# Patient Record
Sex: MALE | Race: WHITE | Employment: FULL TIME | URBAN - METROPOLITAN AREA
[De-identification: names, ages, dates, MRNs, and addresses within clinical notes are randomized per-mention and may not be internally consistent; named-entity substitution may affect disease eponyms.]

---

## 2018-02-07 LAB — TSH SERPL DL<=0.05 MIU/L-ACNC: 2.69 UIU/ML

## 2019-05-28 ENCOUNTER — TELEPHONE (OUTPATIENT)
Dept: FAMILY MEDICINE CLINIC | Age: 34
End: 2019-05-28

## 2019-05-28 NOTE — TELEPHONE ENCOUNTER
There are 3 classes of medicines that are used for sinus allergies. #1 antihistamines, like Claritin,  No. 2 Flonase steroid nasal spray and #3 saline nasal spray to washout allergens. Which of these would he like added to what he is using. ?

## 2019-05-28 NOTE — TELEPHONE ENCOUNTER
----- Message from Dolly Holden LPN sent at 5/54/6000  5:01 PM EDT -----      ----- Message -----  From: Brittnee Barnhart  Sent: 5/28/2019   3:44 PM  To: Loc Poole LPN    HAS ALLERGIES REALLY BAD THIS YEAR AND TRIED OTC MEDS AND NOT ANY HELP. COULD HE GET SOMETHING CALLED IN.     SERA NOBLE

## 2019-06-06 ENCOUNTER — TELEPHONE (OUTPATIENT)
Dept: FAMILY MEDICINE CLINIC | Age: 34
End: 2019-06-06

## 2019-06-06 NOTE — TELEPHONE ENCOUNTER
Requested Prescriptions     Signed Prescriptions Disp Refills    sertraline (ZOLOFT) 50 MG tablet 30 tablet 0     Sig: Take 1 tablet by mouth daily     Authorizing Provider: Gege Walker     Ordering User: Jose Leblanc

## 2019-06-15 SDOH — HEALTH STABILITY: MENTAL HEALTH: HOW OFTEN DO YOU HAVE A DRINK CONTAINING ALCOHOL?: MONTHLY OR LESS

## 2019-07-01 ENCOUNTER — OFFICE VISIT (OUTPATIENT)
Dept: FAMILY MEDICINE CLINIC | Age: 34
End: 2019-07-01
Payer: COMMERCIAL

## 2019-07-01 VITALS
HEIGHT: 69 IN | WEIGHT: 263 LBS | SYSTOLIC BLOOD PRESSURE: 126 MMHG | DIASTOLIC BLOOD PRESSURE: 88 MMHG | HEART RATE: 57 BPM | BODY MASS INDEX: 38.95 KG/M2 | OXYGEN SATURATION: 97 %

## 2019-07-01 DIAGNOSIS — I10 ESSENTIAL HYPERTENSION: Primary | ICD-10-CM

## 2019-07-01 DIAGNOSIS — F41.8 SITUATIONAL ANXIETY: ICD-10-CM

## 2019-07-01 DIAGNOSIS — J30.2 SEASONAL ALLERGIES: ICD-10-CM

## 2019-07-01 DIAGNOSIS — R06.83 SNORING: ICD-10-CM

## 2019-07-01 PROBLEM — F98.8 ATTENTION DEFICIT DISORDER (ADD) WITHOUT HYPERACTIVITY: Status: ACTIVE | Noted: 2019-07-01

## 2019-07-01 PROCEDURE — 99213 OFFICE O/P EST LOW 20 MIN: CPT | Performed by: FAMILY MEDICINE

## 2019-07-01 RX ORDER — TRIAMTERENE AND HYDROCHLOROTHIAZIDE 37.5; 25 MG/1; MG/1
1 TABLET ORAL DAILY
Qty: 90 TABLET | Refills: 1 | Status: SHIPPED | OUTPATIENT
Start: 2019-07-01 | End: 2020-02-04 | Stop reason: SDUPTHER

## 2019-07-01 RX ORDER — TRIAMTERENE AND HYDROCHLOROTHIAZIDE 37.5; 25 MG/1; MG/1
1 TABLET ORAL DAILY
COMMUNITY
End: 2019-07-01 | Stop reason: SDUPTHER

## 2019-07-01 ASSESSMENT — ANXIETY QUESTIONNAIRES
GAD7 TOTAL SCORE: 8
1. FEELING NERVOUS, ANXIOUS, OR ON EDGE: 3-NEARLY EVERY DAY
2. NOT BEING ABLE TO STOP OR CONTROL WORRYING: 1-SEVERAL DAYS
3. WORRYING TOO MUCH ABOUT DIFFERENT THINGS: 1-SEVERAL DAYS
7. FEELING AFRAID AS IF SOMETHING AWFUL MIGHT HAPPEN: 0-NOT AT ALL SURE
4. TROUBLE RELAXING: 0-NOT AT ALL SURE
6. BECOMING EASILY ANNOYED OR IRRITABLE: 3-NEARLY EVERY DAY
5. BEING SO RESTLESS THAT IT IS HARD TO SIT STILL: 0-NOT AT ALL SURE

## 2019-07-01 ASSESSMENT — ENCOUNTER SYMPTOMS
NAUSEA: 0
COUGH: 0
ABDOMINAL PAIN: 0
DIARRHEA: 0
SHORTNESS OF BREATH: 0
VOMITING: 0

## 2019-07-01 ASSESSMENT — PATIENT HEALTH QUESTIONNAIRE - PHQ9
2. FEELING DOWN, DEPRESSED OR HOPELESS: 0
1. LITTLE INTEREST OR PLEASURE IN DOING THINGS: 0
SUM OF ALL RESPONSES TO PHQ QUESTIONS 1-9: 0
SUM OF ALL RESPONSES TO PHQ9 QUESTIONS 1 & 2: 0
SUM OF ALL RESPONSES TO PHQ QUESTIONS 1-9: 0

## 2019-07-01 NOTE — PROGRESS NOTES
consistently. Patient is to follow-up in 6 months unless otherwise needed and that time. Patient verbalized understanding of and agreement with current POC for the assessment and plan dictated above. Electronically signed by JOAQUÍN Villanueva on 7/1/2019      Please note that this chart was generated using dragon dictation software. Although every effort was made to ensure the accuracy of this automated transcription, some errors in transcription may have occurred.

## 2019-07-01 NOTE — PATIENT INSTRUCTIONS
dose of your diabetes medications. It is not known whether fluticasone nasal will harm an unborn baby. Ask a doctor before using this medicine if you are pregnant. It is not known whether fluticasone nasal passes into breast milk or if it could affect a nursing baby. Ask a doctor before using this medicine if you are breast-feeding. How should I use fluticasone nasal?  Use exactly as directed on the label, or as prescribed by your doctor. Do not use in larger or smaller amounts or for longer than recommended. Do not share this medicine with another person, even if they have the same symptoms you have. The usual dose of fluticasone nasal is 1 to 2 sprays into each nostril once or twice per day. Your dose will depend on the fluticasone brand or strength you use, and your dose may change once your symptoms improve. Follow all dosing instructions very carefully. Celia Brink is not approved for use by anyone younger than 25years old. Do not use Flonase in a child younger than 3years old. Do not use Veramyst in a child younger than 3years old. Any child using fluticasone nasal should be supervised by an adult while using the nasal spray. This medicine comes with patient instructions for safe and effective use, and directions for priming the nasal spray device. Follow these directions carefully. Ask your doctor or pharmacist if you have any questions. Shake the nasal spray just before each use. If you switched to fluticasone from another steroid medicine, do not stop using the other steroid suddenly or you may have unpleasant withdrawal symptoms. Talk with your doctor about tapering your steroid dose before stopping completely. To be sure fluticasone nasal is not causing harmful effects on your nose or sinuses, your doctor may need to check your progress on a regular basis. It may take up to several days before your symptoms improve.  Keep using the medication as directed and tell your doctor if your symptoms do adults and children who are at least 3years old. Mometasone nasal is also used to prevent seasonal allergy symptoms in adults and children who are at least 15years old. Mometasone nasal is used to treat nasal polyps only in adults. Mometasone may also be used for other purposes not listed in this medication guide. What should I discuss with my healthcare provider before using mometasone nasal?  You should not use this medicine if you are allergic to mometasone. Tell your doctor if you have ever had:  · an active or chronic infection;  · glaucoma or cataracts;  · herpes simplex virus of your eyes;  · tuberculosis or any other infection or illness;  · sores or ulcers inside your nose; or  · nasal surgery or injury to your nose. It is not known whether this medicine will harm an unborn baby. Tell your doctor if you are pregnant or plan to become pregnant. It may not be safe to breast-feed a baby while you are using this medicine. Ask your doctor about any risks. Mometasone nasal spray is not approved to treat allergy symptoms in anyone younger than 3years old, or to prevent allergy symptoms in anyone younger than 15years old. Mometasone nasal implant  is not approved for use by anyone younger than 25years old. How should I use mometasone nasal?  Follow all directions on your prescription label and read all medication guides or instruction sheets. Use the medicine exactly as directed. Do not take by mouth. Mometasone nasal is for use only in your nose. Your doctor may recommend you start using mometasone nasal 2 to 4 weeks before the start of allergy season. Read and carefully follow any Instructions for Use provided with your medicine. Ask your doctor or pharmacist if you do not understand these instructions. Shake the nasal spray well just before each use. Before your first use, prime the nasal spray pump by spraying the medicine into the air until a fine mist appears.  If the nasal spray has not been near people who are sick or have infections. Call your doctor for preventive treatment if you are exposed to chickenpox or measles. These conditions can be serious or even fatal in people who are using steroid medicine. What are the possible side effects of mometasone nasal?  Get emergency medical help if you have signs of an allergic reaction: hives; difficult breathing; swelling of your face, lips, tongue, or throat. Call your doctor at once if you have:  · severe bleeding or increased drainage from your nose;  · nose pain or discomfort, headache;  · white patches or sores in the nose that won't heal;  · wheezing, trouble breathing;  · vision problems;  · irritation or a choking feeling in the back of your throat (may be signs that the implant has moved inside your nose); or  · ear pain or full feeling, trouble hearing, drainage from the ear. Steroid medicine can affect growth in children. Tell your doctor if your child is not growing at a normal rate while using mometasone nasal.  Although the risk of serious side effects is low when mometasone is used in the nose, side effects can occur if the medicine is absorbed into your bloodstream. Tell your doctor if you have possible signs of long-term steroid use:  · weight gain (especially in your face or your upper back and torso);  · slow wound healing, thinning skin, increased body hair;  · irregular menstrual periods, changes in sexual function; or  · muscle weakness, tired feeling, depression, anxiety, or feeling irritable. Common side effects may include:  · nosebleeds;  · headache;  · stuffy nose, sore throat, cough; or  · flu-like symptoms. This is not a complete list of side effects and others may occur. Call your doctor for medical advice about side effects. You may report side effects to FDA at 6-989-FDA-0603. What other drugs will affect mometasone nasal?  Sometimes it is not safe to use certain medications at the same time.  Some drugs can affect your blood levels of other drugs you take, which may increase side effects or make the medications less effective. Tell your doctor about all your other medicines, especially:  · an antibiotic;  · antifungal medicine;  · an antidepressant; or  · antiviral medicine to treat HIV/AIDS. This list is not complete. Other drugs may affect mometasone nasal, including prescription and over-the-counter medicines, vitamins, and herbal products. Not all possible drug interactions are listed here. Where can I get more information? Your pharmacist can provide more information about mometasone nasal.  Remember, keep this and all other medicines out of the reach of children, never share your medicines with others, and use this medication only for the indication prescribed. Every effort has been made to ensure that the information provided by [address] is accurate, up-to-date, and complete, but no guarantee is made to that effect. Drug information contained herein may be time sensitive. [facility] information has been compiled for use by healthcare practitioners and consumers in the [place] and therefore [facility] does not warrant that uses outside of the [place] are appropriate, unless specifically indicated otherwise. [facility]'s drug information does not endorse drugs, diagnose patients or recommend therapy. [facility] drug information is an informational resource designed to assist licensed healthcare practitioners in caring for their patients and/or to serve consumers viewing this service as a supplement to, and not a substitute for, the expertise, skill, knowledge and judgment of healthcare practitioners. The absence of a warning for a given drug or drug combination in no way should be construed to indicate that the drug or drug combination is safe, effective or appropriate for any given patient.  [facility] does not assume any responsibility for any aspect of healthcare administered with the aid of information

## 2019-07-16 ENCOUNTER — HOSPITAL ENCOUNTER (OUTPATIENT)
Dept: SLEEP CENTER | Age: 34
Discharge: HOME | End: 2019-07-16
Payer: COMMERCIAL

## 2019-07-16 VITALS
BODY MASS INDEX: 40.07 KG/M2 | WEIGHT: 270.5 LBS | DIASTOLIC BLOOD PRESSURE: 77 MMHG | HEIGHT: 69 IN | OXYGEN SATURATION: 96 % | SYSTOLIC BLOOD PRESSURE: 133 MMHG | HEART RATE: 69 BPM

## 2019-07-16 DIAGNOSIS — G47.33 OSA (OBSTRUCTIVE SLEEP APNEA): Primary | ICD-10-CM

## 2019-07-16 PROCEDURE — 99211 OFF/OP EST MAY X REQ PHY/QHP: CPT | Performed by: INTERNAL MEDICINE

## 2019-07-16 ASSESSMENT — SLEEP AND FATIGUE QUESTIONNAIRES
HOW LIKELY ARE YOU TO NOD OFF OR FALL ASLEEP IN A CAR, WHILE STOPPED FOR A FEW MINUTES IN TRAFFIC: 0
HOW LIKELY ARE YOU TO NOD OFF OR FALL ASLEEP WHILE SITTING INACTIVE IN A PUBLIC PLACE: 0
HOW LIKELY ARE YOU TO NOD OFF OR FALL ASLEEP WHEN YOU ARE A PASSENGER IN A CAR FOR AN HOUR WITHOUT A BREAK: 2
ESS TOTAL SCORE: 13
HOW LIKELY ARE YOU TO NOD OFF OR FALL ASLEEP WHILE SITTING AND TALKING TO SOMEONE: 0
HOW LIKELY ARE YOU TO NOD OFF OR FALL ASLEEP WHILE SITTING AND READING: 2
HOW LIKELY ARE YOU TO NOD OFF OR FALL ASLEEP WHILE LYING DOWN TO REST IN THE AFTERNOON WHEN CIRCUMSTANCES PERMIT: 3
HOW LIKELY ARE YOU TO NOD OFF OR FALL ASLEEP WHILE WATCHING TV: 3
HOW LIKELY ARE YOU TO NOD OFF OR FALL ASLEEP WHILE SITTING QUIETLY AFTER LUNCH WITHOUT ALCOHOL: 3

## 2020-01-23 ENCOUNTER — TELEPHONE (OUTPATIENT)
Dept: FAMILY MEDICINE CLINIC | Age: 35
End: 2020-01-23

## 2020-02-04 ENCOUNTER — OFFICE VISIT (OUTPATIENT)
Dept: FAMILY MEDICINE CLINIC | Age: 35
End: 2020-02-04
Payer: COMMERCIAL

## 2020-02-04 VITALS
HEART RATE: 68 BPM | OXYGEN SATURATION: 99 % | DIASTOLIC BLOOD PRESSURE: 80 MMHG | WEIGHT: 277.6 LBS | SYSTOLIC BLOOD PRESSURE: 128 MMHG | BODY MASS INDEX: 41.12 KG/M2 | HEIGHT: 69 IN

## 2020-02-04 PROCEDURE — 99213 OFFICE O/P EST LOW 20 MIN: CPT | Performed by: FAMILY MEDICINE

## 2020-02-04 PROCEDURE — G8431 POS CLIN DEPRES SCRN F/U DOC: HCPCS | Performed by: FAMILY MEDICINE

## 2020-02-04 RX ORDER — TRIAMTERENE AND HYDROCHLOROTHIAZIDE 37.5; 25 MG/1; MG/1
1 TABLET ORAL DAILY
Qty: 90 TABLET | Refills: 1 | Status: SHIPPED | OUTPATIENT
Start: 2020-02-04 | End: 2020-09-28 | Stop reason: SDUPTHER

## 2020-02-04 ASSESSMENT — ANXIETY QUESTIONNAIRES
1. FEELING NERVOUS, ANXIOUS, OR ON EDGE: 1-SEVERAL DAYS
3. WORRYING TOO MUCH ABOUT DIFFERENT THINGS: 1-SEVERAL DAYS
6. BECOMING EASILY ANNOYED OR IRRITABLE: 1-SEVERAL DAYS
5. BEING SO RESTLESS THAT IT IS HARD TO SIT STILL: 1-SEVERAL DAYS
4. TROUBLE RELAXING: 1-SEVERAL DAYS
2. NOT BEING ABLE TO STOP OR CONTROL WORRYING: 1-SEVERAL DAYS
GAD7 TOTAL SCORE: 7
7. FEELING AFRAID AS IF SOMETHING AWFUL MIGHT HAPPEN: 1-SEVERAL DAYS

## 2020-02-04 ASSESSMENT — ENCOUNTER SYMPTOMS
NAUSEA: 0
SHORTNESS OF BREATH: 0
VOMITING: 0
DIARRHEA: 0
ABDOMINAL PAIN: 0
COUGH: 0

## 2020-02-04 ASSESSMENT — PATIENT HEALTH QUESTIONNAIRE - PHQ9
5. POOR APPETITE OR OVEREATING: 3
7. TROUBLE CONCENTRATING ON THINGS, SUCH AS READING THE NEWSPAPER OR WATCHING TELEVISION: 1
SUM OF ALL RESPONSES TO PHQ QUESTIONS 1-9: 13
SUM OF ALL RESPONSES TO PHQ QUESTIONS 1-9: 13
8. MOVING OR SPEAKING SO SLOWLY THAT OTHER PEOPLE COULD HAVE NOTICED. OR THE OPPOSITE, BEING SO FIGETY OR RESTLESS THAT YOU HAVE BEEN MOVING AROUND A LOT MORE THAN USUAL: 0
1. LITTLE INTEREST OR PLEASURE IN DOING THINGS: 1
10. IF YOU CHECKED OFF ANY PROBLEMS, HOW DIFFICULT HAVE THESE PROBLEMS MADE IT FOR YOU TO DO YOUR WORK, TAKE CARE OF THINGS AT HOME, OR GET ALONG WITH OTHER PEOPLE: 0
9. THOUGHTS THAT YOU WOULD BE BETTER OFF DEAD, OR OF HURTING YOURSELF: 0
2. FEELING DOWN, DEPRESSED OR HOPELESS: 1
SUM OF ALL RESPONSES TO PHQ9 QUESTIONS 1 & 2: 2
4. FEELING TIRED OR HAVING LITTLE ENERGY: 3
6. FEELING BAD ABOUT YOURSELF - OR THAT YOU ARE A FAILURE OR HAVE LET YOURSELF OR YOUR FAMILY DOWN: 1
3. TROUBLE FALLING OR STAYING ASLEEP: 3

## 2020-02-04 NOTE — PROGRESS NOTES
Gastrointestinal: Negative for abdominal pain, diarrhea, nausea and vomiting. Skin: Negative for rash. Neurological: Negative for dizziness, light-headedness and headaches. Psychiatric/Behavioral: Negative for self-injury and suicidal ideas. No homicidal ideations       PAST MEDICAL HISTORY  History reviewed. No pertinent past medical history.     FAMILY HISTORY  Family History   Problem Relation Age of Onset    Other Mother         Adriano's    Cancer Father         Testicular       SOCIAL HISTORY  Social History     Socioeconomic History    Marital status:      Spouse name: None    Number of children: None    Years of education: None    Highest education level: None   Occupational History    None   Social Needs    Financial resource strain: None    Food insecurity:     Worry: None     Inability: None    Transportation needs:     Medical: None     Non-medical: None   Tobacco Use    Smoking status: Never Smoker    Smokeless tobacco: Former User     Types: Chew   Substance and Sexual Activity    Alcohol use: Yes     Frequency: Monthly or less     Comment: Occasionally    Drug use: Never    Sexual activity: Yes     Partners: Female   Lifestyle    Physical activity:     Days per week: None     Minutes per session: None    Stress: None   Relationships    Social connections:     Talks on phone: None     Gets together: None     Attends Jain service: None     Active member of club or organization: None     Attends meetings of clubs or organizations: None     Relationship status: None    Intimate partner violence:     Fear of current or ex partner: None     Emotionally abused: None     Physically abused: None     Forced sexual activity: None   Other Topics Concern    None   Social History Narrative    None        SURGICAL HISTORY  Past Surgical History:   Procedure Laterality Date    INGUINAL HERNIA REPAIR Left        CURRENT MEDICATIONS  Current Outpatient Medications Medication Sig Dispense Refill    triamterene-hydrochlorothiazide (MAXZIDE-25) 37.5-25 MG per tablet Take 1 tablet by mouth daily 90 tablet 1    sertraline (ZOLOFT) 50 MG tablet Take 1 tablet by mouth daily 30 tablet 0     No current facility-administered medications for this visit. ALLERGIES  No Known Allergies    PHYSICAL EXAM    Physical Exam  Vitals signs and nursing note reviewed. Constitutional:       General: He is not in acute distress. Appearance: He is well-developed. He is not diaphoretic. HENT:      Head: Normocephalic and atraumatic. Cardiovascular:      Rate and Rhythm: Normal rate and regular rhythm. Heart sounds: Normal heart sounds. Pulmonary:      Effort: Pulmonary effort is normal. No respiratory distress. Breath sounds: Normal breath sounds. Abdominal:      General: Bowel sounds are normal.      Palpations: Abdomen is soft. Tenderness: There is no abdominal tenderness. Skin:     General: Skin is warm and dry. Neurological:      Mental Status: He is alert and oriented to person, place, and time. Psychiatric:         Thought Content: Thought content normal.         ASSESSMENT & PLAN    1. Essential hypertension  Continue taking medications as prescribed and refills will be provided as necessary.  - triamterene-hydrochlorothiazide (MAXZIDE-25) 37.5-25 MG per tablet; Take 1 tablet by mouth daily  Dispense: 90 tablet; Refill: 1    2. Situational anxiety  Continue taking medications as prescribed and refills will be provided as necessary.  - sertraline (ZOLOFT) 50 MG tablet; Take 1 tablet by mouth daily  Dispense: 30 tablet; Refill: 0    3. Positive depression screening  Patient has restarted his Zoloft, and is doing much better. On the basis of positive PHQ-9 screening (PHQ-9 Total Score: 13), the following plan was implemented: continue previously prescribed Zoloft- 50 mg, 1 tablet p.o. daily. - patient will call for any significant medication side effects or worsening symptoms of depression. Patient will follow-up in 4 week(s) with PCP.  - Positive Screen for Clinical Depression with a Documented Follow-up Plan     Patient is to follow-up in approximately 3 to 4 weeks, unless otherwise needed in that time. Pt is to call with any questions, concerns, or increase in symptoms. Pt verbalized understanding of and agreement with current plan of care. Electronically signed by Lila Barthel, PA on 2/4/2020      Please note that this chart was generated using dragon dictation software. Although every effort was made to ensure the accuracy of this automated transcription, some errors in transcription may have occurred.

## 2020-03-03 ENCOUNTER — OFFICE VISIT (OUTPATIENT)
Dept: FAMILY MEDICINE CLINIC | Age: 35
End: 2020-03-03
Payer: COMMERCIAL

## 2020-03-03 VITALS
HEART RATE: 64 BPM | DIASTOLIC BLOOD PRESSURE: 82 MMHG | HEIGHT: 69 IN | SYSTOLIC BLOOD PRESSURE: 128 MMHG | WEIGHT: 277.6 LBS | BODY MASS INDEX: 41.12 KG/M2

## 2020-03-03 DIAGNOSIS — Z00.00 WELLNESS EXAMINATION: ICD-10-CM

## 2020-03-03 LAB
A/G RATIO: 1.5 (ref 1.1–2.2)
ALBUMIN SERPL-MCNC: 4.5 G/DL (ref 3.4–5)
ALP BLD-CCNC: 70 U/L (ref 40–129)
ALT SERPL-CCNC: 227 U/L (ref 10–40)
ANION GAP SERPL CALCULATED.3IONS-SCNC: 17 MMOL/L (ref 3–16)
AST SERPL-CCNC: 61 U/L (ref 15–37)
BASOPHILS ABSOLUTE: 0 K/UL (ref 0–0.2)
BASOPHILS RELATIVE PERCENT: 0.6 %
BILIRUB SERPL-MCNC: <0.2 MG/DL (ref 0–1)
BUN BLDV-MCNC: 17 MG/DL (ref 7–20)
CALCIUM SERPL-MCNC: 9.6 MG/DL (ref 8.3–10.6)
CHLORIDE BLD-SCNC: 102 MMOL/L (ref 99–110)
CHOLESTEROL, TOTAL: 129 MG/DL (ref 0–199)
CO2: 24 MMOL/L (ref 21–32)
CREAT SERPL-MCNC: 0.8 MG/DL (ref 0.9–1.3)
EOSINOPHILS ABSOLUTE: 0.1 K/UL (ref 0–0.6)
EOSINOPHILS RELATIVE PERCENT: 1.3 %
GFR AFRICAN AMERICAN: >60
GFR NON-AFRICAN AMERICAN: >60
GLOBULIN: 3 G/DL
GLUCOSE BLD-MCNC: 93 MG/DL (ref 70–99)
HCT VFR BLD CALC: 44.1 % (ref 40.5–52.5)
HDLC SERPL-MCNC: 29 MG/DL (ref 40–60)
HEMOGLOBIN: 15 G/DL (ref 13.5–17.5)
LDL CHOLESTEROL CALCULATED: 85 MG/DL
LYMPHOCYTES ABSOLUTE: 2.7 K/UL (ref 1–5.1)
LYMPHOCYTES RELATIVE PERCENT: 39 %
MCH RBC QN AUTO: 30.7 PG (ref 26–34)
MCHC RBC AUTO-ENTMCNC: 34.1 G/DL (ref 31–36)
MCV RBC AUTO: 89.9 FL (ref 80–100)
MONOCYTES ABSOLUTE: 0.7 K/UL (ref 0–1.3)
MONOCYTES RELATIVE PERCENT: 10.6 %
NEUTROPHILS ABSOLUTE: 3.4 K/UL (ref 1.7–7.7)
NEUTROPHILS RELATIVE PERCENT: 48.5 %
PDW BLD-RTO: 12.9 % (ref 12.4–15.4)
PLATELET # BLD: 288 K/UL (ref 135–450)
PMV BLD AUTO: 8 FL (ref 5–10.5)
POTASSIUM SERPL-SCNC: 4.4 MMOL/L (ref 3.5–5.1)
RBC # BLD: 4.9 M/UL (ref 4.2–5.9)
SODIUM BLD-SCNC: 143 MMOL/L (ref 136–145)
TOTAL PROTEIN: 7.5 G/DL (ref 6.4–8.2)
TRIGL SERPL-MCNC: 74 MG/DL (ref 0–150)
VLDLC SERPL CALC-MCNC: 15 MG/DL
WBC # BLD: 6.9 K/UL (ref 4–11)

## 2020-03-03 PROCEDURE — 99395 PREV VISIT EST AGE 18-39: CPT | Performed by: FAMILY MEDICINE

## 2020-03-03 RX ORDER — KETOCONAZOLE 20 MG/ML
SHAMPOO TOPICAL
Qty: 120 ML | Refills: 1 | Status: SHIPPED | OUTPATIENT
Start: 2020-03-03 | End: 2020-09-28

## 2020-03-03 ASSESSMENT — ENCOUNTER SYMPTOMS
COUGH: 0
SHORTNESS OF BREATH: 0
ABDOMINAL PAIN: 0

## 2020-03-03 NOTE — PROGRESS NOTES
No homicidal ideations       PAST MEDICAL HISTORY  History reviewed. No pertinent past medical history.     FAMILY HISTORY  Family History   Problem Relation Age of Onset    Other Mother         Adriano's    Cancer Father         Testicular       SOCIAL HISTORY  Social History     Socioeconomic History    Marital status:      Spouse name: None    Number of children: None    Years of education: None    Highest education level: None   Occupational History    None   Social Needs    Financial resource strain: None    Food insecurity:     Worry: None     Inability: None    Transportation needs:     Medical: None     Non-medical: None   Tobacco Use    Smoking status: Never Smoker    Smokeless tobacco: Former User     Types: Chew   Substance and Sexual Activity    Alcohol use: Yes     Frequency: Monthly or less     Comment: Occasionally    Drug use: Never    Sexual activity: Yes     Partners: Female   Lifestyle    Physical activity:     Days per week: None     Minutes per session: None    Stress: None   Relationships    Social connections:     Talks on phone: None     Gets together: None     Attends Restorationist service: None     Active member of club or organization: None     Attends meetings of clubs or organizations: None     Relationship status: None    Intimate partner violence:     Fear of current or ex partner: None     Emotionally abused: None     Physically abused: None     Forced sexual activity: None   Other Topics Concern    None   Social History Narrative    None        SURGICAL HISTORY  Past Surgical History:   Procedure Laterality Date    INGUINAL HERNIA REPAIR Left        CURRENT MEDICATIONS  Current Outpatient Medications   Medication Sig Dispense Refill    triamterene-hydrochlorothiazide (MAXZIDE-25) 37.5-25 MG per tablet Take 1 tablet by mouth daily 90 tablet 1    sertraline (ZOLOFT) 50 MG tablet Take 1 tablet by mouth daily 30 tablet 0     No current facility-administered medications for this visit. ALLERGIES  No Known Allergies    PHYSICAL EXAM    Physical Exam  Vitals signs and nursing note reviewed. Constitutional:       General: He is not in acute distress. Appearance: Normal appearance. He is well-developed. He is not diaphoretic. HENT:      Head: Normocephalic and atraumatic. Right Ear: Hearing, tympanic membrane, ear canal and external ear normal.      Left Ear: Hearing, tympanic membrane, ear canal and external ear normal.      Nose: Nose normal.      Mouth/Throat:      Lips: Pink. Mouth: Mucous membranes are moist.      Tongue: Lesions (White lesion noted on the lateral side of the left tongue near the tip.) present. Pharynx: Oropharynx is clear. Uvula midline. No posterior oropharyngeal erythema. Tonsils: No tonsillar exudate. Eyes:      General: Lids are normal.      Extraocular Movements: Extraocular movements intact. Conjunctiva/sclera: Conjunctivae normal.      Pupils: Pupils are equal, round, and reactive to light. Neck:      Musculoskeletal: Full passive range of motion without pain and normal range of motion. Cardiovascular:      Rate and Rhythm: Normal rate and regular rhythm. Pulses:           Radial pulses are 2+ on the right side and 2+ on the left side. Heart sounds: Normal heart sounds. Pulmonary:      Effort: Pulmonary effort is normal. No respiratory distress. Breath sounds: Normal breath sounds. Abdominal:      General: Bowel sounds are normal.      Palpations: Abdomen is soft. Abdomen is not rigid. Tenderness: There is no abdominal tenderness. There is no guarding or rebound. Musculoskeletal:      Comments: 5/5 muscle strength of BUE and BLE. Skin:     General: Skin is warm and dry. Comments: Erythematous pruritic rash with oily buildup noted in the scalp. Neurological:      Mental Status: He is alert and oriented to person, place, and time. Cranial Nerves: No cranial nerve deficit (Cranial nerves 2-12 grossly inact). Psychiatric:         Speech: Speech normal.         Behavior: Behavior normal. Behavior is cooperative. Thought Content: Thought content normal.         ASSESSMENT & PLAN    1. Wellness examination  Patient will have lab work completed today and medication dosages may be changed based on the lab results. Patient will be updated on lab results once they are completed and notified of any medication changes if necessary.  - Lipid Panel; Future  - Comprehensive Metabolic Panel; Future  - CBC Auto Differential; Future    2. Situational anxiety  Continue taking medications as prescribed and refills will be provided as necessary.  - sertraline (ZOLOFT) 50 MG tablet; Take 1 tablet by mouth daily  Dispense: 90 tablet; Refill: 1    3. Seborrheic dermatitis of scalp  Patient will be prescribed ketoconazole cream to be applied topically daily as needed for his work dermatitis of the scalp. Is to continue using hydrocortisone cream as needed. Is to let us know how he is doing in the next week or so.  - ketoconazole (NIZORAL) 2 % shampoo; Apply topically daily as needed. Dispense: 120 mL; Refill: 1    Patient is to follow-up in approximately 6 months, unless otherwise needed in that time. Pt is to call with any questions, concerns, or increase in symptoms. Pt verbalized understanding of and agreement with current plan of care. Electronically signed by JOAQUÍN Jean on 3/3/2020      Please note that this chart was generated using dragon dictation software. Although every effort was made to ensure the accuracy of this automated transcription, some errors in transcription may have occurred.

## 2020-03-04 ENCOUNTER — TELEPHONE (OUTPATIENT)
Dept: FAMILY MEDICINE CLINIC | Age: 35
End: 2020-03-04

## 2020-09-28 ENCOUNTER — OFFICE VISIT (OUTPATIENT)
Dept: FAMILY MEDICINE CLINIC | Age: 35
End: 2020-09-28
Payer: COMMERCIAL

## 2020-09-28 VITALS
WEIGHT: 284 LBS | TEMPERATURE: 97.2 F | SYSTOLIC BLOOD PRESSURE: 114 MMHG | HEIGHT: 68 IN | DIASTOLIC BLOOD PRESSURE: 82 MMHG | BODY MASS INDEX: 43.04 KG/M2 | HEART RATE: 64 BPM

## 2020-09-28 DIAGNOSIS — R63.5 WEIGHT GAIN: ICD-10-CM

## 2020-09-28 PROBLEM — Z86.59 HISTORY OF ATTENTION DEFICIT DISORDER: Status: ACTIVE | Noted: 2020-09-28

## 2020-09-28 LAB
A/G RATIO: 1.5 (ref 1.1–2.2)
ALBUMIN SERPL-MCNC: 4.3 G/DL (ref 3.4–5)
ALP BLD-CCNC: 66 U/L (ref 40–129)
ALT SERPL-CCNC: 26 U/L (ref 10–40)
ANION GAP SERPL CALCULATED.3IONS-SCNC: 13 MMOL/L (ref 3–16)
AST SERPL-CCNC: 17 U/L (ref 15–37)
BILIRUB SERPL-MCNC: <0.2 MG/DL (ref 0–1)
BUN BLDV-MCNC: 14 MG/DL (ref 7–20)
CALCIUM SERPL-MCNC: 9.4 MG/DL (ref 8.3–10.6)
CHLORIDE BLD-SCNC: 102 MMOL/L (ref 99–110)
CO2: 24 MMOL/L (ref 21–32)
CREAT SERPL-MCNC: 0.8 MG/DL (ref 0.9–1.3)
GFR AFRICAN AMERICAN: >60
GFR NON-AFRICAN AMERICAN: >60
GLOBULIN: 2.8 G/DL
GLUCOSE BLD-MCNC: 80 MG/DL (ref 70–99)
POTASSIUM SERPL-SCNC: 4.2 MMOL/L (ref 3.5–5.1)
SODIUM BLD-SCNC: 139 MMOL/L (ref 136–145)
TOTAL PROTEIN: 7.1 G/DL (ref 6.4–8.2)
TSH SERPL DL<=0.05 MIU/L-ACNC: 1.97 UIU/ML (ref 0.27–4.2)

## 2020-09-28 PROCEDURE — 99214 OFFICE O/P EST MOD 30 MIN: CPT | Performed by: FAMILY MEDICINE

## 2020-09-28 RX ORDER — TRIAMTERENE AND HYDROCHLOROTHIAZIDE 37.5; 25 MG/1; MG/1
1 TABLET ORAL DAILY
Qty: 90 TABLET | Refills: 1 | Status: ON HOLD | OUTPATIENT
Start: 2020-09-28 | End: 2021-06-22

## 2020-09-29 NOTE — PROGRESS NOTES
9/29/2020    Dave Griffith    Chief Complaint   Patient presents with    6 Month Follow-Up    Other     no c/o's       HPI    Mynor Christensen is a 29 y.o. male who presents today with follow-up. Patient notes that he is not used ADD medicines in a long time. He is doing fine without. We have moved that diagnosis to a history of category. Denies orthostasis. Pt without side effects of his bp meds. Notes compliance with bp meds. Patient does not have a list of home blood pressures. Patient is encouraged to check more home blood pressures. Patient feels his anxiety is well controlled. We discussed the possibility of decreasing Zoloft by half. Patient is interested. Patient is apprised of the weight gain. He denies edema. He thought he lost weight. REVIEW OF SYSTEMS    Constitutional:  Denies fever, chills, weight loss or weakness  Eyes:  no photophobia or discharge  ENT:  no sore throat or ear pain  Cardiovascular:  Denies chest pain, palpitations or swelling  Respiratory:  Denies cough or shortness of breath  GI:  no abdominal pain, nausea, vomiting, or diarrhea  Musculoskeletal:  no back pain  Skin:  No rashes  Neurologic:  no headache, focal weakness, or sensory changes  Endocrine:  no polyuria or polydipsia      PAST MEDICAL HISTORY  No past medical history on file.     FAMILY HISTORY  Family History   Problem Relation Age of Onset    Other Mother         Hodgekin's    Cancer Father         Testicular       SOCIAL HISTORY  Social History     Socioeconomic History    Marital status:      Spouse name: Not on file    Number of children: Not on file    Years of education: Not on file    Highest education level: Not on file   Occupational History    Not on file   Social Needs    Financial resource strain: Not on file    Food insecurity     Worry: Not on file     Inability: Not on file    Transportation needs     Medical: Not on file     Non-medical: Not on file   Tobacco Use   

## 2021-03-01 ENCOUNTER — OFFICE VISIT (OUTPATIENT)
Dept: FAMILY MEDICINE CLINIC | Age: 36
End: 2021-03-01
Payer: COMMERCIAL

## 2021-03-01 VITALS
WEIGHT: 286 LBS | SYSTOLIC BLOOD PRESSURE: 130 MMHG | BODY MASS INDEX: 43.35 KG/M2 | RESPIRATION RATE: 16 BRPM | TEMPERATURE: 96.6 F | HEIGHT: 68 IN | HEART RATE: 70 BPM | DIASTOLIC BLOOD PRESSURE: 82 MMHG | OXYGEN SATURATION: 95 %

## 2021-03-01 DIAGNOSIS — M79.672 LEFT FOOT PAIN: Primary | ICD-10-CM

## 2021-03-01 PROCEDURE — 99213 OFFICE O/P EST LOW 20 MIN: CPT | Performed by: STUDENT IN AN ORGANIZED HEALTH CARE EDUCATION/TRAINING PROGRAM

## 2021-03-01 RX ORDER — NAPROXEN 500 MG/1
500 TABLET ORAL 2 TIMES DAILY WITH MEALS
Qty: 28 TABLET | Refills: 0 | Status: SHIPPED | OUTPATIENT
Start: 2021-03-01 | End: 2021-03-29

## 2021-03-01 ASSESSMENT — ENCOUNTER SYMPTOMS
ABDOMINAL PAIN: 0
SORE THROAT: 0
SHORTNESS OF BREATH: 0
NAUSEA: 0
WHEEZING: 0

## 2021-03-01 NOTE — PROGRESS NOTES
3/1/2021    Sandra Watson    Chief Complaint   Patient presents with    Foot Pain     Right foot pain. Present for a few months. Thinks may have started after using a ladder. HPI  History was obtained from patient. Morgan Alfonso is a 28 y.o. male with a PMHx as listed below who presents today for right foot pain. Pain on soles of 3-5 digits of right foot. Ongoing for the past few months progressively getting worse. Gets worse throughout the day. Feels like 'I am walking with a rock in my shoe'. No known trauma/event causing the pain. Has tried soaking in hot water with mild improvement. 1. Left foot pain             REVIEW OF SYMPTOMS    Review of Systems   Constitutional: Negative for chills and fatigue. HENT: Negative for congestion and sore throat. Respiratory: Negative for shortness of breath and wheezing. Cardiovascular: Negative for chest pain and palpitations. Gastrointestinal: Negative for abdominal pain and nausea. Genitourinary: Negative for frequency and urgency. Neurological: Negative for light-headedness. PAST MEDICAL HISTORY  History reviewed. No pertinent past medical history.     FAMILY HISTORY  Family History   Problem Relation Age of Onset    Other Mother         Hodgekin's    Cancer Father         Testicular       SOCIAL HISTORY  Social History     Socioeconomic History    Marital status:      Spouse name: None    Number of children: None    Years of education: None    Highest education level: None   Occupational History    None   Social Needs    Financial resource strain: None    Food insecurity     Worry: None     Inability: None    Transportation needs     Medical: None     Non-medical: None   Tobacco Use    Smoking status: Never Smoker    Smokeless tobacco: Former User     Types: Chew   Substance and Sexual Activity    Alcohol use: Yes     Frequency: Monthly or less     Comment: Occasionally    Drug use: Never    Sexual activity: Yes Breath sounds: No wheezing. Feet:      Right foot:      Protective Sensation: 10 sites tested. 10 sites sensed. Skin integrity: No ulcer, blister or skin breakdown. Toenail Condition: Right toenails are normal.      Left foot:      Protective Sensation: 10 sites tested. 10 sites sensed. Skin integrity: No ulcer, blister or skin breakdown. Toenail Condition: Left toenails are normal.      Comments: Pes planus. TTP 3-5 metatarsals on sole of foot  Skin:     General: Skin is warm and dry. Neurological:      General: No focal deficit present. Mental Status: He is alert. Sensory: No sensory deficit. Comments: Normal gait   Psychiatric:         Mood and Affect: Mood normal.         Behavior: Behavior normal.         ASSESSMENT & PLAN    1. Left foot pain  -NSAIDs PRN, likely MSK secondary to obesity, work, flat feet. Discussed proper shoes/soles. Podiatry referral to better address correct insoles for these pressure spots. No ulcers seen. - Amb External Referral To Podiatry  - naproxen (NAPROSYN) 500 MG tablet; Take 1 tablet by mouth 2 times daily (with meals) for 14 days  Dispense: 28 tablet; Refill: 0      Return for as scheduled.          Electronically signed by Debora Lazcano DO on 3/1/2021

## 2021-03-29 ENCOUNTER — OFFICE VISIT (OUTPATIENT)
Dept: FAMILY MEDICINE CLINIC | Age: 36
End: 2021-03-29
Payer: COMMERCIAL

## 2021-03-29 VITALS
DIASTOLIC BLOOD PRESSURE: 84 MMHG | BODY MASS INDEX: 41.92 KG/M2 | HEIGHT: 69 IN | WEIGHT: 283 LBS | OXYGEN SATURATION: 96 % | SYSTOLIC BLOOD PRESSURE: 122 MMHG | HEART RATE: 65 BPM | TEMPERATURE: 97.2 F

## 2021-03-29 DIAGNOSIS — F41.8 SITUATIONAL ANXIETY: ICD-10-CM

## 2021-03-29 DIAGNOSIS — Z00.00 HEALTHCARE MAINTENANCE: ICD-10-CM

## 2021-03-29 DIAGNOSIS — I10 ESSENTIAL HYPERTENSION: Primary | ICD-10-CM

## 2021-03-29 LAB
ESTIMATED AVERAGE GLUCOSE: 116.9 MG/DL
GLUCOSE BLD-MCNC: 91 MG/DL (ref 70–99)
HBA1C MFR BLD: 5.7 %

## 2021-03-29 PROCEDURE — 99395 PREV VISIT EST AGE 18-39: CPT | Performed by: FAMILY MEDICINE

## 2021-03-29 RX ORDER — FLUOCINONIDE TOPICAL SOLUTION USP, 0.05% 0.5 MG/ML
SOLUTION TOPICAL
Status: ON HOLD | COMMUNITY
Start: 2021-02-16 | End: 2021-10-15 | Stop reason: HOSPADM

## 2021-03-29 RX ORDER — LISINOPRIL AND HYDROCHLOROTHIAZIDE 12.5; 1 MG/1; MG/1
1 TABLET ORAL DAILY
Qty: 30 TABLET | Refills: 5 | Status: SHIPPED | OUTPATIENT
Start: 2021-03-29 | End: 2021-12-02 | Stop reason: SDUPTHER

## 2021-03-29 RX ORDER — TRIAMTERENE AND HYDROCHLOROTHIAZIDE 37.5; 25 MG/1; MG/1
1 TABLET ORAL DAILY
Qty: 90 TABLET | Refills: 1 | Status: CANCELLED | OUTPATIENT
Start: 2021-03-29

## 2021-03-29 SDOH — ECONOMIC STABILITY: FOOD INSECURITY: WITHIN THE PAST 12 MONTHS, THE FOOD YOU BOUGHT JUST DIDN'T LAST AND YOU DIDN'T HAVE MONEY TO GET MORE.: NEVER TRUE

## 2021-03-29 SDOH — ECONOMIC STABILITY: TRANSPORTATION INSECURITY
IN THE PAST 12 MONTHS, HAS LACK OF TRANSPORTATION KEPT YOU FROM MEETINGS, WORK, OR FROM GETTING THINGS NEEDED FOR DAILY LIVING?: NO

## 2021-03-29 SDOH — ECONOMIC STABILITY: TRANSPORTATION INSECURITY
IN THE PAST 12 MONTHS, HAS THE LACK OF TRANSPORTATION KEPT YOU FROM MEDICAL APPOINTMENTS OR FROM GETTING MEDICATIONS?: NO

## 2021-03-29 SDOH — ECONOMIC STABILITY: INCOME INSECURITY: HOW HARD IS IT FOR YOU TO PAY FOR THE VERY BASICS LIKE FOOD, HOUSING, MEDICAL CARE, AND HEATING?: NOT HARD AT ALL

## 2021-03-29 ASSESSMENT — PATIENT HEALTH QUESTIONNAIRE - PHQ9
1. LITTLE INTEREST OR PLEASURE IN DOING THINGS: 0
SUM OF ALL RESPONSES TO PHQ QUESTIONS 1-9: 0
SUM OF ALL RESPONSES TO PHQ QUESTIONS 1-9: 0
SUM OF ALL RESPONSES TO PHQ9 QUESTIONS 1 & 2: 0
SUM OF ALL RESPONSES TO PHQ QUESTIONS 1-9: 0

## 2021-03-29 NOTE — PROGRESS NOTES
3/29/2021    Arleth Walker    Chief Complaint   Patient presents with   Orlando Ferro Annual Exam     well visit       HPI    Chiquita Wallace is a 28 y.o. male who presents today with follow-up. Patient is unsure about getting Covid vaccine. I counseled him. He figures he will before the end of the year. Blood pressure is not and has not been at goal.  Patient is willing to change blood pressure medication. REVIEW OF SYSTEMS    Constitutional:  Denies fever, chills, weight loss or weakness  Eyes:  no photophobia or discharge  ENT:  no sore throat or ear pain  Cardiovascular:  Denies chest pain, palpitations or swelling  Respiratory:  Denies cough or shortness of breath  GI:  no abdominal pain, nausea, vomiting, or diarrhea  Musculoskeletal:  no back pain  Skin:  No rashes  Neurologic:  no headache, focal weakness, or sensory changes  Endocrine:  no polyuria or polydipsia      PAST MEDICAL HISTORY  No past medical history on file.     FAMILY HISTORY  Family History   Problem Relation Age of Onset    Other Mother         Hodgekin's    Cancer Father         Testicular       SOCIAL HISTORY  Social History     Socioeconomic History    Marital status:      Spouse name: None    Number of children: None    Years of education: None    Highest education level: None   Occupational History    None   Social Needs    Financial resource strain: Not hard at all   Marissa-Brenda insecurity     Worry: Never true     Inability: Never true    Transportation needs     Medical: No     Non-medical: No   Tobacco Use    Smoking status: Never Smoker    Smokeless tobacco: Former User     Types: Chew   Substance and Sexual Activity    Alcohol use: Yes     Frequency: Monthly or less     Comment: Occasionally    Drug use: Never    Sexual activity: Yes     Partners: Female   Lifestyle    Physical activity     Days per week: None     Minutes per session: None    Stress: None   Relationships    Social connections     Talks on phone: None Gets together: None     Attends Temple service: None     Active member of club or organization: None     Attends meetings of clubs or organizations: None     Relationship status: None    Intimate partner violence     Fear of current or ex partner: None     Emotionally abused: None     Physically abused: None     Forced sexual activity: None   Other Topics Concern    None   Social History Narrative    None        SURGICAL HISTORY  Past Surgical History:   Procedure Laterality Date    INGUINAL HERNIA REPAIR Left        CURRENT MEDICATIONS  Current Outpatient Medications   Medication Sig Dispense Refill    fluocinonide (LIDEX) 0.05 % external solution apply to rash ON scalp twice a day if needed for psoriasis FLARES      sertraline (ZOLOFT) 50 MG tablet Take 1 tablet by mouth daily 90 tablet 1    lisinopril-hydroCHLOROthiazide (PRINZIDE;ZESTORETIC) 10-12.5 MG per tablet Take 1 tablet by mouth daily 30 tablet 5    triamterene-hydroCHLOROthiazide (MAXZIDE-25) 37.5-25 MG per tablet Take 1 tablet by mouth daily 90 tablet 1     No current facility-administered medications for this visit. ALLERGIES  No Known Allergies    PHYSICAL EXAM  /84   Pulse 65   Temp 97.2 °F (36.2 °C)   Ht 5' 9\" (1.753 m)   Wt 283 lb (128.4 kg)   SpO2 96%   BMI 41.79 kg/m²     ASSESSMENT & PLAN    1. Essential hypertension  Stop Maxide start lisinopril hydrochlorothiazide as below. Patient follow blood pressure call back if not at goal.  - lisinopril-hydroCHLOROthiazide (PRINZIDE;ZESTORETIC) 10-12.5 MG per tablet; Take 1 tablet by mouth daily  Dispense: 30 tablet; Refill: 5    2. Situational anxiety  Issue controlled. Continue meds. Refilled meds. - sertraline (ZOLOFT) 50 MG tablet; Take 1 tablet by mouth daily  Dispense: 90 tablet; Refill: 1    3. Healthcare maintenance  - Hemoglobin A1C; Future  - Glucose;  Future    Follow-up 6 months    Electronically signed by Ngozi Pablo MD on 3/29/2021

## 2021-04-01 DIAGNOSIS — Z00.00 HEALTHCARE MAINTENANCE: Primary | ICD-10-CM

## 2021-05-10 DIAGNOSIS — Z00.00 HEALTHCARE MAINTENANCE: ICD-10-CM

## 2021-05-10 LAB
CHOLESTEROL, TOTAL: 185 MG/DL (ref 0–199)
HDLC SERPL-MCNC: 39 MG/DL (ref 40–60)
LDL CHOLESTEROL CALCULATED: 122 MG/DL
TRIGL SERPL-MCNC: 120 MG/DL (ref 0–150)
VLDLC SERPL CALC-MCNC: 24 MG/DL

## 2021-06-16 ENCOUNTER — APPOINTMENT (OUTPATIENT)
Dept: GENERAL RADIOLOGY | Age: 36
End: 2021-06-16
Payer: COMMERCIAL

## 2021-06-16 ENCOUNTER — HOSPITAL ENCOUNTER (EMERGENCY)
Age: 36
Discharge: HOME | End: 2021-06-17
Attending: EMERGENCY MEDICINE
Payer: COMMERCIAL

## 2021-06-16 VITALS
SYSTOLIC BLOOD PRESSURE: 113 MMHG | RESPIRATION RATE: 18 BRPM | BODY MASS INDEX: 39.99 KG/M2 | WEIGHT: 270 LBS | DIASTOLIC BLOOD PRESSURE: 68 MMHG | HEIGHT: 69 IN | HEART RATE: 90 BPM | OXYGEN SATURATION: 94 % | TEMPERATURE: 98.5 F

## 2021-06-16 DIAGNOSIS — S86.811A RUPTURE OF RIGHT PATELLAR TENDON, INITIAL ENCOUNTER: Primary | ICD-10-CM

## 2021-06-16 PROCEDURE — 99282 EMERGENCY DEPT VISIT SF MDM: CPT

## 2021-06-16 PROCEDURE — 73564 X-RAY EXAM KNEE 4 OR MORE: CPT

## 2021-06-16 ASSESSMENT — PAIN DESCRIPTION - PAIN TYPE: TYPE: ACUTE PAIN

## 2021-06-16 ASSESSMENT — PAIN DESCRIPTION - LOCATION: LOCATION: KNEE

## 2021-06-16 ASSESSMENT — PAIN SCALES - GENERAL: PAINLEVEL_OUTOF10: 10

## 2021-06-16 ASSESSMENT — PAIN DESCRIPTION - ORIENTATION: ORIENTATION: RIGHT

## 2021-06-17 RX ORDER — HYDROCODONE BITARTRATE AND ACETAMINOPHEN 5; 325 MG/1; MG/1
1 TABLET ORAL ONCE
Status: DISCONTINUED | OUTPATIENT
Start: 2021-06-17 | End: 2021-06-17 | Stop reason: HOSPADM

## 2021-06-17 RX ORDER — HYDROCODONE BITARTRATE AND ACETAMINOPHEN 5; 325 MG/1; MG/1
1-2 TABLET ORAL EVERY 6 HOURS PRN
Qty: 10 TABLET | Refills: 0 | Status: SHIPPED | OUTPATIENT
Start: 2021-06-17 | End: 2021-06-20

## 2021-06-17 NOTE — ED PROVIDER NOTES
Triage Chief Complaint:   Knee Injury (right)    [place]:  Gayle Kimble is a 28 y.o. male that presents with right knee injury. States that he was running the base path when he suddenly felt a pop in his right knee and his knee buckled. Denies other injury. He has been nonweightbearing and has been unable to extend at the knee. States that he has constant achy pain. Denies motor or sensory deficits. ROS:  At least 6 systems reviewed and otherwise acutely negative except as in the [address] History reviewed. No pertinent past medical history. Past Surgical History:   Procedure Laterality Date    INGUINAL HERNIA REPAIR Left      Family History   Problem Relation Age of Onset    Other Mother         Hodgekin's    Cancer Father         Testicular     Social History     Socioeconomic History    Marital status:      Spouse name: Not on file    Number of children: Not on file    Years of education: Not on file    Highest education level: Not on file   Occupational History    Not on file   Tobacco Use    Smoking status: Never Smoker    Smokeless tobacco: Former User     Types: Chew   Substance and Sexual Activity    Alcohol use: Yes     Comment: Occasionally    Drug use: Never    Sexual activity: Yes     Partners: Female   Other Topics Concern    Not on file   Social History Narrative    Not on file     Social Determinants of Health     Financial Resource Strain: Low Risk     Difficulty of Paying Living Expenses: Not hard at all   Food Insecurity: No Food Insecurity    Worried About [address] in the Last Year: Never true    [facility] in the Last Year: Never true   Transportation Needs: No Transportation Needs    Lack of Transportation (Medical): No    Lack of Transportation (Non-Medical):  No   Physical Activity:     Days of Exercise per Week:     Minutes of Exercise per Session:    Stress:     Feeling of Stress :    Social Connections:     Frequency of Communication with Friends and Family:     Frequency of Social Gatherings with Friends and Family:     Attends Protestant Services:     Active Member of Clubs or Organizations:     Attends Club or Organization Meetings:     Marital Status:    Intimate Partner Violence:     Fear of Current or Ex-Partner:     Emotionally Abused:     Physically Abused:     Sexually Abused:      Current Facility-Administered Medications   Medication Dose Route Frequency Provider Last Rate Last Admin    HYDROcodone-acetaminophen (NORCO) 5-325 MG per tablet 1 tablet  1 tablet Oral Once Teresa Gomez MD         Current Outpatient Medications   Medication Sig Dispense Refill    HYDROcodone-acetaminophen (NORCO) 5-325 MG per tablet Take 1-2 tablets by mouth every 6 hours as needed for Pain for up to 3 days. 10 tablet 0    fluocinonide (LIDEX) 0.05 % external solution apply to rash ON scalp twice a day if needed for psoriasis FLARES      sertraline (ZOLOFT) 50 MG tablet Take 1 tablet by mouth daily 90 tablet 1    lisinopril-hydroCHLOROthiazide (PRINZIDE;ZESTORETIC) 10-12.5 MG per tablet Take 1 tablet by mouth daily 30 tablet 5    triamterene-hydroCHLOROthiazide (MAXZIDE-25) 37.5-25 MG per tablet Take 1 tablet by mouth daily 90 tablet 1     No Known Allergies    Nursing Notes Reviewed    Physical Exam:  ED Triage Vitals [06/16/21 2015]   Enc Vitals Group      /68      Pulse 90      Resp 18      Temp 98.5 °F (36.9 °C)      Temp src       SpO2 94 %      Weight 270 lb (122.5 kg)      Height 5' 9\" (1.753 m)      Head Circumference       Peak Flow       Pain Score       Pain Loc       Pain Edu? Excl. in [address]? GENERAL APPEARANCE: Awake and alert. Cooperative. No acute distress. EXTREMITIES: RLE: Abrasion anterior knee. Ballotable infrapatellar effusion. No extension at the knee. Ligamentous instability with anterior drawer testing. 2+ DP pulse. Sensory distribution of sural/saphenous/tibial/peroneal nerves intact  SKIN: Warm and dry.

## 2021-06-17 NOTE — DISCHARGE INSTR - COC
Continuity of Care Form    Patient Name: Mona Jacobsen   :  1985  MRN:  [id number]    Admit date:  (Not on file)  Discharge date:  ***    Code Status Order: No Order   Advance Directives:     Admitting Physician:  No admitting provider for patient encounter. PCP: Nicole Lange MD    Discharging Nurse: [facility] Unit/Room#: No information available for this encounter. Discharging Unit Phone Number: ***    Emergency Contact:   Extended Emergency Contact Information  Primary Emergency Contact: [address] Phone: [phone]  Work Phone: [phone]  Mobile Phone: [phone]  Relation: Spouse  Secondary Emergency Contact: [facility] Phone: [phone]  Work Phone: [phone]  Mobile Phone: [phone]  Relation: Parent    Past Surgical History:  Past Surgical History:   Procedure Laterality Date    INGUINAL HERNIA REPAIR Left        Immunization History: There is no immunization history on file for this patient.     Active Problems:  Patient Active Problem List   Diagnosis Code    Essential hypertension I10    Situational anxiety F41.8    History of attention deficit disorder Z86.59       Isolation/Infection:   Isolation          No Isolation        Patient Infection Status     None to display          Nurse Assessment:  Last Vital Signs: /68   Pulse 90   Temp 98.5 °F (36.9 °C)   Resp 18   Ht 5' 9\" (1.753 m)   Wt 270 lb (122.5 kg)   SpO2 94%   BMI 39.87 kg/m²     Last documented pain score (0-10 scale): Pain Level: 10  Last Weight:   Wt Readings from Last 1 Encounters:   21 270 lb (122.5 kg)     Mental Status:  {IP PT MENTAL STATUS:}    IV Access:  { KALA IV ACCESS:763800420}    Nursing Mobility/ADLs:  Walking   {CHP DME UEUR:296195681}  Transfer  {CHP DME CYGI:627671788}  Bathing  {CHP DME HUHR:451964214}  Dressing  {CHP DME PBKF:995853749}  Toileting  {CHP DME TQCB:707036876}  Feeding  {CHP DME NIPM:613174854}  Med Admin  {P DME MJEK:729357202}  Med Delivery   [employer] MED Delivery:573162456}    Wound Care Documentation and Therapy:        Elimination:  Continence:   · Bowel: {YES / PO:07355}  · Bladder: {YES / SH:00172}  Urinary Catheter: {Urinary Catheter:479314643}   Colostomy/Ileostomy/Ileal Conduit: {YES / ZW:21431}       Date of Last BM: ***  No intake or output data in the 24 hours ending 21 0104  No intake/output data recorded.     Safety Concerns:     [address] [employer] Safety Concerns:030318924}    Impairments/Disabilities:      [address] [employer] Impairments/Disabilities:044632606}    Nutrition Therapy:  Current Nutrition Therapy:   [address] [employer] Diet List:179797369}    Routes of Feeding: {CHP DME Other Feedings:395256835}  Liquids: {Slp liquid thickness:70343}  Daily Fluid Restriction: {CHP DME Yes amt example:091707891}  Last Modified Barium Swallow with Video (Video Swallowing Test): {Done Not Done ZMGS:815745102}    Treatments at the Time of Hospital Discharge:   Respiratory Treatments: ***  Oxygen Therapy:  {Therapy; copd oxygen:51190}  Ventilator:    { CC Vent UDCJ:934848860}    Rehab Therapies: {THERAPEUTIC INTERVENTION:0671711554}  Weight Bearing Status/Restrictions: [employer]  Weight Bearin}  Other Medical Equipment (for information only, NOT a DME order):  {EQUIPMENT:587324777}  Other Treatments: ***    Patient's personal belongings (please select all that are sent with patient):  {[facility] DME Belongings:139393723}    RN SIGNATURE:  {Esignature:368013140}    CASE MANAGEMENT/SOCIAL WORK SECTION    Inpatient Status Date: ***    Readmission Risk Assessment Score:  Readmission Risk              Risk of Unplanned Readmission:  0           Discharging to Facility/ Agency   · Name:   · Address:  · Phone:  · Fax:    Dialysis Facility (if applicable)   · Name:  · Address:  · Dialysis Schedule:  · Phone:  · Fax:    / signature: {Esignature:776196951}    PHYSICIAN SECTION    Prognosis: {Prognosis:5686147466}    Condition

## 2021-06-17 NOTE — ED PROVIDER NOTES
As the Remote Tele physician-in-triage, I performed a medical screening history and physical exam on this patient remotely via the [place]  Herbert Tao is a 28 y.o. male with right knee pain. He injured it playing softball tonight. He reports his knee buckled while trying to stop at third. Unable to bear weight. PHYSICAL EXAM  LMP 01/09/2020     On exam, the patient appears in no acute distress. Speech is clear. Breathing is unlabored. Comment: Please note this report has been produced using speech recognition software and may contain errors related to that system including errors in grammar, punctuation, and spelling, as well as words and phrases that may be inappropriate. If there are any questions or concerns please feel free to contact the dictating provider for clarification.         Khurram Alejandre MD  06/16/21 0499

## 2021-06-21 ENCOUNTER — OFFICE VISIT (OUTPATIENT)
Dept: ORTHOPEDIC SURGERY | Age: 36
End: 2021-06-21
Payer: COMMERCIAL

## 2021-06-21 VITALS
HEART RATE: 95 BPM | HEIGHT: 69 IN | BODY MASS INDEX: 39.99 KG/M2 | OXYGEN SATURATION: 98 % | RESPIRATION RATE: 15 BRPM | WEIGHT: 270 LBS

## 2021-06-21 DIAGNOSIS — S86.811A RUPTURE OF RIGHT PATELLAR TENDON, INITIAL ENCOUNTER: Primary | ICD-10-CM

## 2021-06-21 DIAGNOSIS — S86.811A RUPTURE OF RIGHT PATELLAR TENDON, INITIAL ENCOUNTER: ICD-10-CM

## 2021-06-21 DIAGNOSIS — Z01.818 PRE-OP EXAM: Primary | ICD-10-CM

## 2021-06-21 PROCEDURE — 99203 OFFICE O/P NEW LOW 30 MIN: CPT | Performed by: ORTHOPAEDIC SURGERY

## 2021-06-21 RX ORDER — MELOXICAM 7.5 MG/1
TABLET ORAL
Status: ON HOLD | COMMUNITY
Start: 2021-06-07 | End: 2021-07-04

## 2021-06-21 ASSESSMENT — ENCOUNTER SYMPTOMS
BACK PAIN: 0
COLOR CHANGE: 0
CHEST TIGHTNESS: 0

## 2021-06-21 NOTE — PATIENT INSTRUCTIONS
We will schedule surgery at soonest convenience. If you have any questions regarding your surgery please call our office and ask to speak with Carline.  [phone]    Surgery: Right Patella Tendon Repair   Date:  Other Info:

## 2021-06-21 NOTE — PROGRESS NOTES
6/21/21 - . Surgery 6/22/21 @ 1330, arrival 1030               1. Do not eat or drink anything after midnight - unless instructed by your doctor prior to surgery. This includes                   no water, chewing gum or mints. 2. Follow your directions as prescribed by the doctor for your procedure and medications. 3. Check with your Doctor regarding stopping Plavix, Coumadin, Lovenox,Effient,Pradaxa,Xarelto, Fragmin or other blood thinners and                   follow their instructions. 4. Do not smoke, and do not drink any alcoholic beverages 24 hours prior to surgery. This includes NA Beer. 5. You may brush your teeth and gargle the morning of surgery. DO NOT SWALLOW WATER   6. You MUST make arrangements for a responsible adult to take you home after your surgery and be able to check on you every couple                   hours for the day. You will not be allowed to leave alone or drive yourself home. It is strongly suggested someone stay with you the first 24                   hrs. Your surgery will be cancelled if you do not have a ride home. 7. Please wear simple, loose fitting clothing to the hospital.  Davey Bynum not bring valuables (money, credit cards, checkbooks, etc.) Do not wear any                   makeup (including no eye makeup) or nail polish on your fingers or toes. 8. DO NOT wear any jewelry or piercings on day of surgery. All body piercing jewelry must be removed. 9. If you have dentures, they will be removed before going to the OR; we will provide you a container. If you wear contact lenses or glasses,                  they will be removed; please bring a case for them. 10. If you  have a Living Will and Durable Power of  for Healthcare, please bring in a copy. 11. Please bring picture ID,  insurance card, paperwork from the doctors office    (H & P, Consent, & card for implantable devices).            12. Take a shower the night before or morning of your procedure, do not apply any lotion, oil or powder. 13. Wear a mask covering your nose & mouth when entering the hospital. Have your covid-19 test performed within 10 days of your                  surgery. Quarantine yourself after the test until after your surgery.

## 2021-06-21 NOTE — PROGRESS NOTES
Subjective:      Patient ID: [id number] Gamez is a 28 y.o. male. Patient presents to the office today for ED FU of the right patellar tendon rupture DOI 6/16/21. Pt states he was running [place] playing softball when his knee gave out on him and he fell to the ground. Pt states pain today is an 8/10 pt states he has been using ice and ibuprofen for the pain. Pain is located globally in the knee and will increase with walking or weightbearing     He comes in today for his preop visit with me in regards to his right knee injury. He states that since the injury he has continued to have significant dull and aching pain along the anterior aspect of the right knee. He also states that he has significant weakness and is unable to fully straighten his right knee at all. Patient denies any prior injury to the involved extremity/ joint, denies numbness or tingling in the involved extremity and denies fever or chills. Review of Systems   Constitutional: Negative for activity change, chills and fever. Respiratory: Negative for chest tightness. Cardiovascular: Negative for chest pain. Musculoskeletal: Positive for arthralgias, gait problem, joint swelling and myalgias. Negative for back pain. Skin: Negative for color change, pallor, rash and wound. Neurological: Positive for weakness. Negative for numbness. No past medical history on file. Objective:   Physical Exam  Constitutional:       Appearance: He is well-developed. HENT:      Head: Normocephalic. Eyes:      Pupils: Pupils are equal, round, and reactive to light. Pulmonary:      Effort: Pulmonary effort is normal.   Musculoskeletal:         General: Swelling, tenderness, deformity and signs of injury present. Cervical back: Normal range of motion. Right hip: Normal.      Left hip: Normal.      Right knee: Swelling, deformity and effusion present. No erythema, ecchymosis, lacerations or bony tenderness.  Decreased range of motion. Tenderness present over the patellar tendon. No medial joint line, lateral joint line, MCL or LCL tenderness. No LCL laxity or MCL laxity. Normal alignment and normal patellar mobility. Left knee: Normal. No swelling, deformity, effusion, erythema, ecchymosis, lacerations or bony tenderness. Normal range of motion. No tenderness. No medial joint line, lateral joint line, MCL, LCL or patellar tendon tenderness. No LCL laxity or MCL laxity. Normal alignment and normal patellar mobility. Skin:     General: Skin is warm and dry. Capillary Refill: Capillary refill takes less than 2 seconds. Coloration: Skin is not pale. Findings: No erythema or rash. Neurological:      Mental Status: He is alert and oriented to person, place, and time. Right knee-multiple superficial abrasions to the lower leg, no redness, no signs of infection. Moderate knee effusion  He is unable to actively extend the right knee. XRAY  X-ray 2 views of the right knee from June 16, 2021 Reviewed by me today in the office demonstrates age appropriate bone density throughout with patella sushil, there is a small questionable avulsion fracture off the inferior pole of the patella, no subluxation or dislocation noted. Assessment:      Right knee patellar tendon rupture      Plan:      I discussed with him today his x-ray findings. I explained to him that he does have a rupture of his right knee patellar tendon which will require surgical treatment. I discussed with him today performing repair of his right knee patellar tendon. I explained risks, benefits, possible complications of the procedure and answered all questions for the patient. I explained postoperative rehabilitation protocol and expectations with the patient today. The patient understands and consents to the procedure. Patient will follow up with their primary care physician prior to surgical treatment for preoperative clearance.   We will schedule surgery at [facility]. Continue weight-bearing as tolerated. Continue range of motion exercises as instructed. Ice and elevate as needed. Tylenol or Motrin for pain. Follow up in 2 weeks postop. We will plan on proceeding with surgical treatment tomorrow.         Celia 97, DO

## 2021-06-21 NOTE — LETTER
[facility]  [address] [address]  Phone: [phone]  Fax: [phone]    Celia Ballesteros, DO        June 21, 2021     Patient: Thomas Barreto   YOB: 1985   Date of Visit: 6/21/2021       To Whom It May Concern: It is my medical opinion that Verner Band should remain out of work until cleared by physician. Patient is scheduled to have surgery tomorrow with Dr. Martin He and will be off of work for an extended period of time. .    If you have any questions or concerns, please don't hesitate to call.     Sincerely,        Celia Ballesteros, DO

## 2021-06-21 NOTE — PROGRESS NOTES
Patient presents to the office today for ED FU of the right patellar tendon rupture DOI 6/16/21. Pt states he was running to [place] playing softball when his knee gave out on him and he fell to the ground. Pt states pain today is an 8/10 pt states he has been using ice and ibuprofen for the pain.  Pain is located globally in the knee and will increase with walking or weightbearing

## 2021-06-22 ENCOUNTER — ANESTHESIA (OUTPATIENT)
Dept: OPERATING ROOM | Age: 36
End: 2021-06-22
Payer: COMMERCIAL

## 2021-06-22 ENCOUNTER — ANESTHESIA EVENT (OUTPATIENT)
Dept: OPERATING ROOM | Age: 36
End: 2021-06-22
Payer: COMMERCIAL

## 2021-06-22 ENCOUNTER — HOSPITAL ENCOUNTER (OUTPATIENT)
Age: 36
Setting detail: OUTPATIENT SURGERY
Discharge: HOME | End: 2021-06-22
Attending: ORTHOPAEDIC SURGERY | Admitting: ORTHOPAEDIC SURGERY
Payer: COMMERCIAL

## 2021-06-22 ENCOUNTER — TELEPHONE (OUTPATIENT)
Dept: ORTHOPEDIC SURGERY | Age: 36
End: 2021-06-22

## 2021-06-22 VITALS
OXYGEN SATURATION: 95 % | HEIGHT: 69 IN | HEART RATE: 79 BPM | TEMPERATURE: 96.9 F | RESPIRATION RATE: 16 BRPM | BODY MASS INDEX: 39.99 KG/M2 | SYSTOLIC BLOOD PRESSURE: 147 MMHG | WEIGHT: 270 LBS | DIASTOLIC BLOOD PRESSURE: 86 MMHG

## 2021-06-22 VITALS
RESPIRATION RATE: 13 BRPM | OXYGEN SATURATION: 96 % | DIASTOLIC BLOOD PRESSURE: 62 MMHG | SYSTOLIC BLOOD PRESSURE: 109 MMHG | TEMPERATURE: 98.6 F

## 2021-06-22 DIAGNOSIS — S86.811A RUPTURE OF RIGHT PATELLAR TENDON, INITIAL ENCOUNTER: Primary | ICD-10-CM

## 2021-06-22 LAB
SARS-COV-2, NAAT: NOT DETECTED
SOURCE: NORMAL

## 2021-06-22 PROCEDURE — 87635 SARS-COV-2 COVID-19 AMP PRB: CPT

## 2021-06-22 PROCEDURE — 27380 REPAIR OF KNEECAP TENDON: CPT | Performed by: ORTHOPAEDIC SURGERY

## 2021-06-22 PROCEDURE — 6360000002 HC RX W HCPCS: Performed by: ORTHOPAEDIC SURGERY

## 2021-06-22 PROCEDURE — 2709999900 HC NON-CHARGEABLE SUPPLY: Performed by: ORTHOPAEDIC SURGERY

## 2021-06-22 PROCEDURE — 64447 NJX AA&/STRD FEMORAL NRV IMG: CPT | Performed by: ANESTHESIOLOGY

## 2021-06-22 PROCEDURE — 2580000003 HC RX 258: Performed by: ORTHOPAEDIC SURGERY

## 2021-06-22 PROCEDURE — 27380 REPAIR OF KNEECAP TENDON: CPT | Performed by: PHYSICIAN ASSISTANT

## 2021-06-22 PROCEDURE — 6370000000 HC RX 637 (ALT 250 FOR IP): Performed by: ORTHOPAEDIC SURGERY

## 2021-06-22 PROCEDURE — 3600000013 HC SURGERY LEVEL 3 ADDTL 15MIN: Performed by: ORTHOPAEDIC SURGERY

## 2021-06-22 PROCEDURE — 6360000002 HC RX W HCPCS: Performed by: ANESTHESIOLOGY

## 2021-06-22 PROCEDURE — 2780000010 HC IMPLANT OTHER: Performed by: ORTHOPAEDIC SURGERY

## 2021-06-22 PROCEDURE — 7100000001 HC PACU RECOVERY - ADDTL 15 MIN: Performed by: ORTHOPAEDIC SURGERY

## 2021-06-22 PROCEDURE — 7100000011 HC PHASE II RECOVERY - ADDTL 15 MIN: Performed by: ORTHOPAEDIC SURGERY

## 2021-06-22 PROCEDURE — 7100000010 HC PHASE II RECOVERY - FIRST 15 MIN: Performed by: ORTHOPAEDIC SURGERY

## 2021-06-22 PROCEDURE — 6360000002 HC RX W HCPCS: Performed by: NURSE ANESTHETIST, CERTIFIED REGISTERED

## 2021-06-22 PROCEDURE — 3600000003 HC SURGERY LEVEL 3 BASE: Performed by: ORTHOPAEDIC SURGERY

## 2021-06-22 PROCEDURE — 2500000003 HC RX 250 WO HCPCS: Performed by: NURSE ANESTHETIST, CERTIFIED REGISTERED

## 2021-06-22 PROCEDURE — 3700000001 HC ADD 15 MINUTES (ANESTHESIA): Performed by: ORTHOPAEDIC SURGERY

## 2021-06-22 PROCEDURE — 7100000000 HC PACU RECOVERY - FIRST 15 MIN: Performed by: ORTHOPAEDIC SURGERY

## 2021-06-22 PROCEDURE — 3700000000 HC ANESTHESIA ATTENDED CARE: Performed by: ORTHOPAEDIC SURGERY

## 2021-06-22 RX ORDER — METOCLOPRAMIDE HYDROCHLORIDE 5 MG/ML
10 INJECTION INTRAMUSCULAR; INTRAVENOUS
Status: DISCONTINUED | OUTPATIENT
Start: 2021-06-22 | End: 2021-06-22 | Stop reason: HOSPADM

## 2021-06-22 RX ORDER — CEPHALEXIN 250 MG/1
250 CAPSULE ORAL 4 TIMES DAILY
Qty: 4 CAPSULE | Refills: 0 | Status: SHIPPED | OUTPATIENT
Start: 2021-06-22 | End: 2021-06-23

## 2021-06-22 RX ORDER — SODIUM CHLORIDE, SODIUM LACTATE, POTASSIUM CHLORIDE, CALCIUM CHLORIDE 600; 310; 30; 20 MG/100ML; MG/100ML; MG/100ML; MG/100ML
INJECTION, SOLUTION INTRAVENOUS CONTINUOUS
Status: DISCONTINUED | OUTPATIENT
Start: 2021-06-22 | End: 2021-06-22 | Stop reason: HOSPADM

## 2021-06-22 RX ORDER — DEXAMETHASONE SODIUM PHOSPHATE 4 MG/ML
INJECTION, SOLUTION INTRA-ARTICULAR; INTRALESIONAL; INTRAMUSCULAR; INTRAVENOUS; SOFT TISSUE PRN
Status: DISCONTINUED | OUTPATIENT
Start: 2021-06-22 | End: 2021-06-22 | Stop reason: SDUPTHER

## 2021-06-22 RX ORDER — ACETAMINOPHEN 500 MG
1000 TABLET ORAL ONCE
Status: COMPLETED | OUTPATIENT
Start: 2021-06-22 | End: 2021-06-22

## 2021-06-22 RX ORDER — SODIUM CHLORIDE 9 MG/ML
25 INJECTION, SOLUTION INTRAVENOUS PRN
Status: DISCONTINUED | OUTPATIENT
Start: 2021-06-22 | End: 2021-06-22 | Stop reason: HOSPADM

## 2021-06-22 RX ORDER — OXYCODONE HYDROCHLORIDE AND ACETAMINOPHEN 5; 325 MG/1; MG/1
1 TABLET ORAL EVERY 6 HOURS PRN
Qty: 20 TABLET | Refills: 0 | Status: SHIPPED | OUTPATIENT
Start: 2021-06-22 | End: 2021-06-29

## 2021-06-22 RX ORDER — MIDAZOLAM HYDROCHLORIDE 1 MG/ML
INJECTION INTRAMUSCULAR; INTRAVENOUS PRN
Status: DISCONTINUED | OUTPATIENT
Start: 2021-06-22 | End: 2021-06-22 | Stop reason: SDUPTHER

## 2021-06-22 RX ORDER — OXYCODONE HYDROCHLORIDE 5 MG/1
10 TABLET ORAL EVERY 4 HOURS PRN
Status: CANCELLED | OUTPATIENT
Start: 2021-06-22

## 2021-06-22 RX ORDER — PROMETHAZINE HYDROCHLORIDE 25 MG/ML
6.25 INJECTION, SOLUTION INTRAMUSCULAR; INTRAVENOUS
Status: DISCONTINUED | OUTPATIENT
Start: 2021-06-22 | End: 2021-06-22 | Stop reason: HOSPADM

## 2021-06-22 RX ORDER — HYDROCODONE BITARTRATE AND ACETAMINOPHEN 5; 325 MG/1; MG/1
1 TABLET ORAL PRN
Status: DISCONTINUED | OUTPATIENT
Start: 2021-06-22 | End: 2021-06-22 | Stop reason: HOSPADM

## 2021-06-22 RX ORDER — SODIUM CHLORIDE 0.9 % (FLUSH) 0.9 %
10 SYRINGE (ML) INJECTION EVERY 12 HOURS SCHEDULED
Status: CANCELLED | OUTPATIENT
Start: 2021-06-22

## 2021-06-22 RX ORDER — SODIUM CHLORIDE 9 MG/ML
25 INJECTION, SOLUTION INTRAVENOUS PRN
Status: CANCELLED | OUTPATIENT
Start: 2021-06-22

## 2021-06-22 RX ORDER — ONDANSETRON 2 MG/ML
INJECTION INTRAMUSCULAR; INTRAVENOUS PRN
Status: DISCONTINUED | OUTPATIENT
Start: 2021-06-22 | End: 2021-06-22 | Stop reason: SDUPTHER

## 2021-06-22 RX ORDER — HYDRALAZINE HYDROCHLORIDE 20 MG/ML
5 INJECTION INTRAMUSCULAR; INTRAVENOUS EVERY 10 MIN PRN
Status: DISCONTINUED | OUTPATIENT
Start: 2021-06-22 | End: 2021-06-22 | Stop reason: HOSPADM

## 2021-06-22 RX ORDER — SODIUM CHLORIDE 0.9 % (FLUSH) 0.9 %
10 SYRINGE (ML) INJECTION PRN
Status: CANCELLED | OUTPATIENT
Start: 2021-06-22

## 2021-06-22 RX ORDER — SODIUM CHLORIDE 0.9 % (FLUSH) 0.9 %
10 SYRINGE (ML) INJECTION PRN
Status: DISCONTINUED | OUTPATIENT
Start: 2021-06-22 | End: 2021-06-22 | Stop reason: HOSPADM

## 2021-06-22 RX ORDER — PROPOFOL 10 MG/ML
INJECTION, EMULSION INTRAVENOUS PRN
Status: DISCONTINUED | OUTPATIENT
Start: 2021-06-22 | End: 2021-06-22 | Stop reason: SDUPTHER

## 2021-06-22 RX ORDER — ROPIVACAINE HYDROCHLORIDE 5 MG/ML
INJECTION, SOLUTION EPIDURAL; INFILTRATION; PERINEURAL
Status: COMPLETED | OUTPATIENT
Start: 2021-06-22 | End: 2021-06-22

## 2021-06-22 RX ORDER — HYDROMORPHONE HCL 110MG/55ML
0.5 PATIENT CONTROLLED ANALGESIA SYRINGE INTRAVENOUS EVERY 5 MIN PRN
Status: DISCONTINUED | OUTPATIENT
Start: 2021-06-22 | End: 2021-06-22 | Stop reason: HOSPADM

## 2021-06-22 RX ORDER — FENTANYL CITRATE 50 UG/ML
INJECTION, SOLUTION INTRAMUSCULAR; INTRAVENOUS PRN
Status: DISCONTINUED | OUTPATIENT
Start: 2021-06-22 | End: 2021-06-22 | Stop reason: SDUPTHER

## 2021-06-22 RX ORDER — LABETALOL HYDROCHLORIDE 5 MG/ML
5 INJECTION, SOLUTION INTRAVENOUS EVERY 10 MIN PRN
Status: DISCONTINUED | OUTPATIENT
Start: 2021-06-22 | End: 2021-06-22 | Stop reason: HOSPADM

## 2021-06-22 RX ORDER — MEPERIDINE HYDROCHLORIDE 25 MG/ML
12.5 INJECTION INTRAMUSCULAR; INTRAVENOUS; SUBCUTANEOUS EVERY 5 MIN PRN
Status: DISCONTINUED | OUTPATIENT
Start: 2021-06-22 | End: 2021-06-22 | Stop reason: HOSPADM

## 2021-06-22 RX ORDER — LIDOCAINE HYDROCHLORIDE 20 MG/ML
INJECTION, SOLUTION INTRAVENOUS PRN
Status: DISCONTINUED | OUTPATIENT
Start: 2021-06-22 | End: 2021-06-22 | Stop reason: SDUPTHER

## 2021-06-22 RX ORDER — SODIUM CHLORIDE, SODIUM LACTATE, POTASSIUM CHLORIDE, CALCIUM CHLORIDE 600; 310; 30; 20 MG/100ML; MG/100ML; MG/100ML; MG/100ML
INJECTION, SOLUTION INTRAVENOUS CONTINUOUS
Status: CANCELLED | OUTPATIENT
Start: 2021-06-22

## 2021-06-22 RX ORDER — FENTANYL CITRATE 50 UG/ML
50 INJECTION, SOLUTION INTRAMUSCULAR; INTRAVENOUS EVERY 5 MIN PRN
Status: DISCONTINUED | OUTPATIENT
Start: 2021-06-22 | End: 2021-06-22 | Stop reason: HOSPADM

## 2021-06-22 RX ORDER — OXYCODONE HYDROCHLORIDE 5 MG/1
5 TABLET ORAL EVERY 4 HOURS PRN
Status: CANCELLED | OUTPATIENT
Start: 2021-06-22

## 2021-06-22 RX ORDER — KETAMINE HCL 50MG/ML(1)
SYRINGE (ML) INTRAVENOUS PRN
Status: DISCONTINUED | OUTPATIENT
Start: 2021-06-22 | End: 2021-06-22 | Stop reason: SDUPTHER

## 2021-06-22 RX ORDER — SODIUM CHLORIDE 0.9 % (FLUSH) 0.9 %
10 SYRINGE (ML) INJECTION EVERY 12 HOURS SCHEDULED
Status: DISCONTINUED | OUTPATIENT
Start: 2021-06-22 | End: 2021-06-22 | Stop reason: HOSPADM

## 2021-06-22 RX ORDER — KETOROLAC TROMETHAMINE 30 MG/ML
INJECTION, SOLUTION INTRAMUSCULAR; INTRAVENOUS PRN
Status: DISCONTINUED | OUTPATIENT
Start: 2021-06-22 | End: 2021-06-22 | Stop reason: SDUPTHER

## 2021-06-22 RX ORDER — ONDANSETRON 2 MG/ML
4 INJECTION INTRAMUSCULAR; INTRAVENOUS EVERY 6 HOURS PRN
Status: CANCELLED | OUTPATIENT
Start: 2021-06-22

## 2021-06-22 RX ORDER — HYDROCODONE BITARTRATE AND ACETAMINOPHEN 5; 325 MG/1; MG/1
2 TABLET ORAL PRN
Status: DISCONTINUED | OUTPATIENT
Start: 2021-06-22 | End: 2021-06-22 | Stop reason: HOSPADM

## 2021-06-22 RX ORDER — ONDANSETRON 4 MG/1
4 TABLET, ORALLY DISINTEGRATING ORAL EVERY 8 HOURS PRN
Status: CANCELLED | OUTPATIENT
Start: 2021-06-22

## 2021-06-22 RX ORDER — DIPHENHYDRAMINE HYDROCHLORIDE 50 MG/ML
12.5 INJECTION INTRAMUSCULAR; INTRAVENOUS
Status: DISCONTINUED | OUTPATIENT
Start: 2021-06-22 | End: 2021-06-22 | Stop reason: HOSPADM

## 2021-06-22 RX ADMIN — SODIUM CHLORIDE, POTASSIUM CHLORIDE, SODIUM LACTATE AND CALCIUM CHLORIDE: 600; 310; 30; 20 INJECTION, SOLUTION INTRAVENOUS at 16:07

## 2021-06-22 RX ADMIN — MIDAZOLAM 2 MG: 1 INJECTION INTRAMUSCULAR; INTRAVENOUS at 13:54

## 2021-06-22 RX ADMIN — ROPIVACAINE HYDROCHLORIDE 30 ML: 5 INJECTION, SOLUTION EPIDURAL; INFILTRATION; PERINEURAL at 14:03

## 2021-06-22 RX ADMIN — LIDOCAINE HYDROCHLORIDE 100 MG: 20 INJECTION, SOLUTION INTRAVENOUS at 15:14

## 2021-06-22 RX ADMIN — FENTANYL CITRATE 50 MCG: 50 INJECTION, SOLUTION INTRAMUSCULAR; INTRAVENOUS at 17:08

## 2021-06-22 RX ADMIN — FENTANYL CITRATE 50 MCG: 50 INJECTION, SOLUTION INTRAMUSCULAR; INTRAVENOUS at 16:45

## 2021-06-22 RX ADMIN — ACETAMINOPHEN 1000 MG: 500 TABLET, FILM COATED ORAL at 12:03

## 2021-06-22 RX ADMIN — KETOROLAC TROMETHAMINE 30 MG: 30 INJECTION, SOLUTION INTRAMUSCULAR; INTRAVENOUS at 16:06

## 2021-06-22 RX ADMIN — CEFAZOLIN SODIUM 3000 MG: 1 INJECTION, POWDER, FOR SOLUTION INTRAMUSCULAR; INTRAVENOUS at 15:23

## 2021-06-22 RX ADMIN — Medication 50 MG: at 15:21

## 2021-06-22 RX ADMIN — DEXAMETHASONE SODIUM PHOSPHATE 8 MG: 4 INJECTION, SOLUTION INTRAMUSCULAR; INTRAVENOUS at 15:14

## 2021-06-22 RX ADMIN — FENTANYL CITRATE 100 MCG: 50 INJECTION, SOLUTION INTRAMUSCULAR; INTRAVENOUS at 15:14

## 2021-06-22 RX ADMIN — SODIUM CHLORIDE, POTASSIUM CHLORIDE, SODIUM LACTATE AND CALCIUM CHLORIDE: 600; 310; 30; 20 INJECTION, SOLUTION INTRAVENOUS at 12:00

## 2021-06-22 RX ADMIN — ONDANSETRON 4 MG: 2 INJECTION INTRAMUSCULAR; INTRAVENOUS at 15:22

## 2021-06-22 RX ADMIN — PROPOFOL 200 MG: 10 INJECTION, EMULSION INTRAVENOUS at 15:14

## 2021-06-22 ASSESSMENT — PULMONARY FUNCTION TESTS
PIF_VALUE: 20
PIF_VALUE: 19
PIF_VALUE: 18
PIF_VALUE: 19
PIF_VALUE: 20
PIF_VALUE: 20
PIF_VALUE: 25
PIF_VALUE: 7
PIF_VALUE: 18
PIF_VALUE: 21
PIF_VALUE: 18
PIF_VALUE: 5
PIF_VALUE: 18
PIF_VALUE: 20
PIF_VALUE: 18
PIF_VALUE: 22
PIF_VALUE: 18
PIF_VALUE: 4
PIF_VALUE: 18
PIF_VALUE: 1
PIF_VALUE: 18
PIF_VALUE: 18
PIF_VALUE: 21
PIF_VALUE: 19
PIF_VALUE: 18
PIF_VALUE: 20
PIF_VALUE: 18
PIF_VALUE: 17
PIF_VALUE: 18
PIF_VALUE: 20
PIF_VALUE: 18
PIF_VALUE: 20
PIF_VALUE: 18
PIF_VALUE: 19
PIF_VALUE: 18
PIF_VALUE: 7
PIF_VALUE: 1
PIF_VALUE: 18
PIF_VALUE: 4
PIF_VALUE: 18
PIF_VALUE: 20
PIF_VALUE: 18

## 2021-06-22 ASSESSMENT — PAIN SCALES - GENERAL
PAINLEVEL_OUTOF10: 8
PAINLEVEL_OUTOF10: 7
PAINLEVEL_OUTOF10: 0
PAINLEVEL_OUTOF10: 2
PAINLEVEL_OUTOF10: 5
PAINLEVEL_OUTOF10: 5

## 2021-06-22 ASSESSMENT — PAIN DESCRIPTION - PAIN TYPE
TYPE: SURGICAL PAIN
TYPE: SURGICAL PAIN

## 2021-06-22 ASSESSMENT — PAIN DESCRIPTION - LOCATION
LOCATION: KNEE
LOCATION: KNEE

## 2021-06-22 ASSESSMENT — PAIN DESCRIPTION - DESCRIPTORS
DESCRIPTORS: ACHING

## 2021-06-22 ASSESSMENT — PAIN - FUNCTIONAL ASSESSMENT: PAIN_FUNCTIONAL_ASSESSMENT: 0-10

## 2021-06-22 ASSESSMENT — PAIN DESCRIPTION - ORIENTATION
ORIENTATION: RIGHT
ORIENTATION: RIGHT

## 2021-06-22 NOTE — PROGRESS NOTES
1830:  Discharge instructions given to pt and spouse. Both verbalized an understanding. 1845:  Pt discharged to home alert and oriented with 2/10c/o right knee discomfort. Knee immobilizer in place. Right foot warm to the touch, pt able to move toes on command.

## 2021-06-22 NOTE — PROGRESS NOTES
1625- Pt arrived from OR awake and following commands. Monitor applied. Alarms on and verified. Bedside hand off provided by Heber Asif CRNA. 1640- Ice chips given to pt. Tolerated well. Denies nausea at this time. -   5070- Medicated for complaint of surgical pain/discomfort. 3223- PACU care complete.

## 2021-06-22 NOTE — ANESTHESIA POSTPROCEDURE EVALUATION
Department of Anesthesiology  Postprocedure Note    Patient: Edwige Maguire  MRN: [id number]  [place]: 1985  Date of evaluation: 6/22/2021  Time:  4:38 PM     Procedure Summary     Date: 06/22/21 Room / Location: [address]    Anesthesia Start: 6038 Anesthesia Stop: 0240    Procedure: RIGHT PATELLA TENDON REPAIR (Right ) Diagnosis: (RUPTURE OF RIGHT PATELLAR TENDON)    Surgeons: Laura Juarez DO Responsible Provider: Michelle Penaloza DO    Anesthesia Type: general, regional ASA Status: 3          Anesthesia Type: general, regional    Freeman Phase I: Freeman Score: 9    Freeman Phase II:      Last vitals: Reviewed and per EMR flowsheets.        Anesthesia Post Evaluation    Patient location during evaluation: PACU  Patient participation: complete - patient participated  Level of consciousness: sleepy but conscious  Airway patency: patent  Nausea & Vomiting: no nausea  Complications: no  Cardiovascular status: hemodynamically stable  Respiratory status: acceptable  Hydration status: euvolemic

## 2021-06-22 NOTE — TELEPHONE ENCOUNTER
Called [facility] spoke with [place] no prior Alaniz Flower is needed for cpt code [phone] with dx code [phone]   This is all based on medical necessity   Ref# [id number]

## 2021-06-22 NOTE — BRIEF OP NOTE
Brief Postoperative Note      Patient: Latanya Griffin  YOB: 1985  MRN: [id number]    Date of Procedure: 6/22/2021    Pre-Op Diagnosis: RUPTURE OF RIGHT PATELLAR TENDON    Post-Op Diagnosis: Same       Procedure(s):  RIGHT PATELLA TENDON REPAIR    Surgeon(s):  Heather Lopez DO    Assistant:  Physician Assistant: Osiris Marcelo PA-C    Anesthesia: General    Estimated Blood Loss (mL): Minimal    Complications: None    Specimens:   * No specimens in log *    Implants:  Implant Name Type Inv.  Item Serial No.  Lot No. LRB No. Used Action   [employer]      [id number] Right 1 Implanted         Drains: * No LDAs found *    Findings: R patellar tendon rupture    Electronically signed by Celia Ballesteros DO on 6/22/2021 at 4:06 PM

## 2021-06-22 NOTE — ANESTHESIA PRE PROCEDURE
Department of Anesthesiology  Preprocedure Note       Name:  Stephanie Wright   Age:  28 y.o.  :  1985                                          MRN:  [id number]         Date:  2021      Surgeon: Alek Dozier):  Todd Jennings DO    Procedure: Procedure(s):  RIGHT PATELLA TENDON REPAIR    Medications prior to admission:   Prior to Admission medications    Medication Sig Start Date End Date Taking? Authorizing Provider   meloxicam (MOBIC) 7.5 MG tablet take 1 tablet by mouth once daily with food 21   Historical Provider, MD   fluocinonide (LIDEX) 0.05 % external solution apply to rash ON scalp twice a day if needed for psoriasis FLARES 21   Historical Provider, MD   sertraline (ZOLOFT) 50 MG tablet Take 1 tablet by mouth daily 3/29/21 9/25/21  Jackie Albarado MD   lisinopril-hydroCHLOROthiazide [facility]) 10-12.5 MG per tablet Take 1 tablet by mouth daily 3/29/21 4/28/21  Jackie Albarado MD   triamterene-hydroCHLOROthiazide [facility]) 37.5-25 MG per tablet Take 1 tablet by mouth daily 20   Jackie Albarado MD       Current medications:    No current facility-administered medications for this encounter.      Current Outpatient Medications   Medication Sig Dispense Refill    meloxicam (MOBIC) 7.5 MG tablet take 1 tablet by mouth once daily with food      fluocinonide (LIDEX) 0.05 % external solution apply to rash ON scalp twice a day if needed for psoriasis FLARES      sertraline (ZOLOFT) 50 MG tablet Take 1 tablet by mouth daily 90 tablet 1    lisinopril-hydroCHLOROthiazide (PRINZIDE;ZESTORETIC) 10-12.5 MG per tablet Take 1 tablet by mouth daily 30 tablet 5    triamterene-hydroCHLOROthiazide (MAXZIDE-25) 37.5-25 MG per tablet Take 1 tablet by mouth daily 90 tablet 1       Allergies:  No Known Allergies    Problem List:    Patient Active Problem List   Diagnosis Code    Essential hypertension I10    Situational anxiety F41.8    History of attention deficit disorder Z86.59       Past Medical History:  History reviewed. No pertinent past medical history. Past Surgical History:        Procedure Laterality Date    INGUINAL HERNIA REPAIR Left 1986       Social History:    Social History     Tobacco Use    Smoking status: Never Smoker    Smokeless tobacco: Former User     Types: Chew   Substance Use Topics    Alcohol use: Yes     Comment: Occasionally                                Counseling given: Not Answered      Vital Signs (Current): There were no vitals filed for this visit. BP Readings from Last 3 Encounters:   03/29/21 122/84   03/01/21 130/82   09/28/20 114/82       NPO Status:                                                                                 BMI:   Wt Readings from Last 3 Encounters:   06/21/21 270 lb (122.5 kg)   03/29/21 283 lb (128.4 kg)   03/01/21 286 lb (129.7 kg)     There is no height or weight on file to calculate BMI.    CBC:   Lab Results   Component Value Date    WBC 6.9 03/03/2020    RBC 4.90 03/03/2020    HGB 15.0 03/03/2020    HCT 44.1 03/03/2020    MCV 89.9 03/03/2020    RDW 12.9 03/03/2020     03/03/2020       CMP:   Lab Results   Component Value Date     09/28/2020    K 4.2 09/28/2020     09/28/2020    CO2 24 09/28/2020    BUN 14 09/28/2020    CREATININE 0.8 09/28/2020    GFRAA >60 09/28/2020    AGRATIO 1.5 09/28/2020    LABGLOM >60 09/28/2020    GLUCOSE 91 03/29/2021    PROT 7.1 09/28/2020    CALCIUM 9.4 09/28/2020    BILITOT <0.2 09/28/2020    ALKPHOS 66 09/28/2020    AST 17 09/28/2020    ALT 26 09/28/2020       POC Tests: No results for input(s): POCGLU, POCNA, POCK, POCCL, POCBUN, POCHEMO, POCHCT in the last 72 hours.     Coags: No results found for: PROTIME, INR, APTT    HCG (If Applicable): No results found for: PREGTESTUR, PREGSERUM, HCG, HCGQUANT     ABGs: No results found for: PHART, PO2ART, CML9OIF, PWQ9OXM, BEART, S8AQWKXT     Type & Screen (If

## 2021-06-22 NOTE — H&P
Subjective:      Patient ID: Jailyn Krueger is a 28 y.o. male.     Patient presents to the office today for ED FU of the right patellar tendon rupture DOI 6/16/21. Pt states he was running to [employer] [place] playing softball when his knee gave out on him and he fell to the ground. Pt states pain today is an 8/10 pt states he has been using ice and ibuprofen for the pain. Pain is located globally in the knee and will increase with walking or weightbearing      He comes in today for his preop visit with me in regards to his right knee injury. He states that since the injury he has continued to have significant dull and aching pain along the anterior aspect of the right knee. He also states that he has significant weakness and is unable to fully straighten his right knee at all. Patient denies any prior injury to the involved extremity/ joint, denies numbness or tingling in the involved extremity and denies fever or chills.           Review of Systems   Constitutional: Negative for activity change, chills and fever. Respiratory: Negative for chest tightness. Cardiovascular: Negative for chest pain. Musculoskeletal: Positive for arthralgias, gait problem, joint swelling and myalgias. Negative for back pain. Skin: Negative for color change, pallor, rash and wound. Neurological: Positive for weakness. Negative for numbness.         Past Medical History   No past medical history on file.        No current facility-administered medications on file prior to encounter.      Current Outpatient Medications on File Prior to Encounter   Medication Sig Dispense Refill    fluocinonide (LIDEX) 0.05 % external solution apply to rash ON scalp twice a day if needed for psoriasis FLARES      lisinopril-hydroCHLOROthiazide (PRINZIDE;ZESTORETIC) 10-12.5 MG per tablet Take 1 tablet by mouth daily 30 tablet 5    meloxicam (MOBIC) 7.5 MG tablet take 1 tablet by mouth once daily with food      sertraline (ZOLOFT) 50 MG tablet Take 1 tablet by mouth daily 90 tablet 1     No Known Allergies  Past Surgical History:   Procedure Laterality Date    INGUINAL HERNIA REPAIR Left 1986     Social History     Tobacco Use    Smoking status: Never Smoker    Smokeless tobacco: Former User     Types: Chew   Vaping Use    Vaping Use: Never used   Substance Use Topics    Alcohol use: Yes     Comment: Occasionally    Drug use: Never     Family History   Problem Relation Age of Onset    Other Mother         Brandi    Cancer Father         Testicular       Objective:   Physical Exam  Constitutional:       Appearance: He is well-developed. HENT:      Head: Normocephalic. Eyes:      Pupils: Pupils are equal, round, and reactive to light. Pulmonary:      Effort: Pulmonary effort is normal.   Musculoskeletal:         General: Swelling, tenderness, deformity and signs of injury present. Cervical back: Normal range of motion. Right hip: Normal.      Left hip: Normal.      Right knee: Swelling, deformity and effusion present. No erythema, ecchymosis, lacerations or bony tenderness. Decreased range of motion. Tenderness present over the patellar tendon. No medial joint line, lateral joint line, MCL or LCL tenderness. No LCL laxity or MCL laxity. Normal alignment and normal patellar mobility. Left knee: Normal. No swelling, deformity, effusion, erythema, ecchymosis, lacerations or bony tenderness. Normal range of motion. No tenderness. No medial joint line, lateral joint line, MCL, LCL or patellar tendon tenderness. No LCL laxity or MCL laxity. Normal alignment and normal patellar mobility. Skin:     General: Skin is warm and dry. Capillary Refill: Capillary refill takes less than 2 seconds. Coloration: Skin is not pale. Findings: No erythema or rash. Neurological:      Mental Status: He is alert and oriented to person, place, and time.     Right knee-multiple superficial abrasions to the lower leg, no redness, no signs of procedure were discussed. The patient does wish to proceed with the procedure at this time.       Pt seen and examined, No change in H+P.             HAL JOYCE,

## 2021-06-22 NOTE — ANESTHESIA PROCEDURE NOTES
Peripheral Block    Patient location during procedure: pre-op  Start time: 6/22/2021 1:56 PM  End time: 6/22/2021 2:03 PM  Staffing  Performed: anesthesiologist   Anesthesiologist: José Rodriguez DO  Preanesthetic Checklist  Completed: patient identified, IV checked, site marked, risks and benefits discussed, surgical consent, monitors and equipment checked, pre-op evaluation, timeout performed, anesthesia consent given, oxygen available and patient being monitored  Peripheral Block  Patient position: supine  Prep: ChloraPrep  Patient monitoring: cardiac monitor, continuous pulse ox, frequent blood pressure checks and IV access  Block type: Femoral  Laterality: right  Injection technique: single-shot  Guidance: ultrasound guided  Local infiltration: lidocaine  Infiltration strength: 1 %  Dose: 3 mL  Adductor canal (Low Femoral)  Provider prep: mask and sterile gloves  Local infiltration: lidocaine  Needle  Needle gauge: 22 G  Needle length: 8 cm  Needle localization: ultrasound guidance  Assessment  Injection assessment: negative aspiration for heme, no paresthesia on injection and local visualized surrounding nerve on ultrasound  Paresthesia pain: none  Slow fractionated injection: yes  Hemodynamics: stable  Additional Notes  U/S 37044.  (1) Under ultrasound guidance, a 21 gauge needle was inserted and placed in close proximity to the adductor canal.  (2) Ultrasound was also used to visualize the spread of the anesthetic in close proximity to the nerve being blocked. (3) The nerve appeared anatomically normal, and (4 there were no apparent abnormal pathological findings on the image that were readily visible and related to the nerve being blocked. (5) A permanent ultrasound image was saved in the patient's record.             Medications Administered  Ropivacaine (NAROPIN) injection 0.5%, 30 mL  Reason for block: post-op pain management and at surgeon's request

## 2021-07-03 ENCOUNTER — HOSPITAL ENCOUNTER (EMERGENCY)
Age: 36
Discharge: HOME | DRG: 857 | End: 2021-07-03
Attending: EMERGENCY MEDICINE
Payer: COMMERCIAL

## 2021-07-03 ENCOUNTER — APPOINTMENT (OUTPATIENT)
Dept: GENERAL RADIOLOGY | Age: 36
DRG: 857 | End: 2021-07-03
Payer: COMMERCIAL

## 2021-07-03 VITALS
WEIGHT: 270 LBS | HEIGHT: 69 IN | BODY MASS INDEX: 39.99 KG/M2 | SYSTOLIC BLOOD PRESSURE: 122 MMHG | RESPIRATION RATE: 14 BRPM | DIASTOLIC BLOOD PRESSURE: 75 MMHG | TEMPERATURE: 98.5 F | OXYGEN SATURATION: 98 % | HEART RATE: 95 BPM

## 2021-07-03 DIAGNOSIS — Z98.890 H/O RIGHT KNEE SURGERY: ICD-10-CM

## 2021-07-03 DIAGNOSIS — T81.49XA CELLULITIS, WOUND, POST-OPERATIVE: Primary | ICD-10-CM

## 2021-07-03 PROCEDURE — 99283 EMERGENCY DEPT VISIT LOW MDM: CPT

## 2021-07-03 PROCEDURE — 73562 X-RAY EXAM OF KNEE 3: CPT

## 2021-07-03 RX ORDER — DOXYCYCLINE HYCLATE 100 MG
100 TABLET ORAL 2 TIMES DAILY
Qty: 20 TABLET | Refills: 0 | Status: ON HOLD | OUTPATIENT
Start: 2021-07-03 | End: 2021-07-08 | Stop reason: HOSPADM

## 2021-07-03 ASSESSMENT — PAIN DESCRIPTION - LOCATION: LOCATION: KNEE

## 2021-07-03 ASSESSMENT — PAIN SCALES - GENERAL: PAINLEVEL_OUTOF10: 3

## 2021-07-03 ASSESSMENT — PAIN DESCRIPTION - ORIENTATION: ORIENTATION: RIGHT

## 2021-07-03 ASSESSMENT — PAIN DESCRIPTION - PAIN TYPE: TYPE: ACUTE PAIN

## 2021-07-03 NOTE — ED NOTES
Patient verbalizes understanding of discharge instructions. Patient walks out of ED with crutches with even and unlabored respirations.       Jd Francisco RN  07/03/21 [id number]

## 2021-07-03 NOTE — ED PROVIDER NOTES
[address]  Chief Complaint   Patient presents with    Joint Swelling     rt knee swelling/ pain; post-op knee surg 6/22; dog stepped on leg yesterday     HISTORY OF PRESENT ILLNESS  Leslee Rowley is a 28 y.o. male who presents to the ED complaining of right knee swelling. Over the past 24 hours he has had some mild increase in pain. Surgery was 6/22/21 by Dr. Jony Loya for patellar tendon rupture. He says prior to this he was recovering quite well. Felt \"feverish\" on Wednesday but never had a fever. At that point the right knee was not bothering him. He says the past day though he has noticed some spreading redness/warmth to the wound. No purulence or drainage though. No bleeding from the wound. Has a f/u appt on Wednesday. About a day ago his dog accidentally jumped on top of his right knee yesterday morning by accident. Many of his symptoms began after that. No right calf pain/swelling. No history of DVT or PE. He is still non-weight-bearing and not supposed to bend his knee or extend his leg. No other complaints, modifying factors or associated symptoms. Nursing notes reviewed. History reviewed. No pertinent past medical history.   Past Surgical History:   Procedure Laterality Date    INGUINAL HERNIA REPAIR Left 1986    PATELLAR TENDON REPAIR Right 6/22/2021    RIGHT PATELLA TENDON REPAIR performed by Zoila Chen DO at [facility] OR     Family History   Problem Relation Age of Onset    Other Mother         Adriano's    Cancer Father         Testicular     Social History     Socioeconomic History    Marital status:      Spouse name: Not on file    Number of children: Not on file    Years of education: Not on file    Highest education level: Not on file   Occupational History    Not on file   Tobacco Use    Smoking status: Never Smoker    Smokeless tobacco: Former User     Types: [address] positives per HPI otherwise noted to be negative    PHYSICAL EXAM   /75   Pulse 95   Temp 98.5 °F (36.9 °C)   Resp 14   Ht 5' 9\" (1.753 m)   Wt 270 lb (122.5 kg)   SpO2 98%   BMI 39.87 kg/m²    GENERAL APPEARANCE: Awake and alert. Cooperative. No acute distress. HEAD: Normocephalic. Atraumatic. EYES: PERRL. EOM's grossly intact. ENT: Mucous membranes are moist.   NECK: Supple. Normal ROM. CHEST: Equal symmetric chest rise. RRR  LUNGS: Breathing is unlabored. Speaking comfortably in full sentences. CTAB  ABDOMEN: Nondistended, nontender  SKIN: Warm and dry. No acute rashes except as below. NEUROLOGICAL: Alert and oriented. Strength is 5/5 in all extremities and sensation is intact. MUSCULOSKELETAL:  LLE: No tenderness. 2+ DP and PT. Sensation and motor function fully intact. Full range of motion of all major joints. No erythema, bruising, or lacerations. Compartments are soft. 2+ patellar reflex. Achilles nontender and intact. Able to bear weight. No joint swelling or effusions are noted. RLE: Right anterior knee staples are intact with no dehiscence, no bleeding or wound drainage. However around the wound there is erythema warmth and some tenderness noted. No crepitus. No fluctuance. No areas of focal induration. Patient tells me this is new over the past 24 hours and more similar to how he appeared postoperatively. There is no popliteal tenderness. There is no tenderness of the calf shin thigh hip ankle or foot. 2+ DP PT pulses distally. Limited range of motion to the right knee due to postoperative status. Weightbearing not assessed for this reason. This limits hip range of motion testing as well but the ankle has full ROM. Compartments are soft. Achilles nontender and intact.           RADIOLOGY    XR KNEE RIGHT (3 VIEWS)    Result Date: 7/3/2021  EXAMINATION: THREE XRAY VIEWS OF THE RIGHT KNEE 7/3/2021 12:45 pm COMPARISON: 06/16/2021 HISTORY: ORDERING SYSTEM PROVIDED HISTORY: post op pain TECHNOLOGIST PROVIDED HISTORY: Reason for exam:->post op pain Reason for Exam: post op pain Acuity: Acute Type of Exam: Initial Mechanism of Injury: reports his dog stepped on his knee yesterday Relevant Medical/Surgical History: Rt knee surgery on 6/22, rt knee swelling FINDINGS: Surgical skin staples are present anteriorly with soft tissue swelling. Improved alignment of the patella. No joint effusion. Joint spaces are maintained. No acute fracture. Postoperative changes with improved patellar alignment. Associated prepatellar soft tissue swelling. Otherwise no acute abnormality. XR KNEE RIGHT (MIN 4 VIEWS)    Result Date: 6/16/2021  EXAMINATION: FOUR XRAY VIEWS OF THE RIGHT KNEE 6/16/2021 8:15 pm COMPARISON: None. HISTORY: ORDERING SYSTEM PROVIDED HISTORY: injury after knee \"buckled\" TECHNOLOGIST PROVIDED HISTORY: Reason for exam:->injury after knee \"buckled\" Reason for Exam: injury after knee \"buckled\" Acuity: Acute Type of Exam: Initial Mechanism of Injury: injury after knee \"buckled\" Relevant Medical/Surgical History: injury after knee \"buckled\" FINDINGS: Ossific density measuring approximately 5 mm projecting inferior to the patella along the patella tendon. This is age indeterminate. Soft tissue swelling and mild irregularity along the anterior soft tissues in expected position of the patella tendon. No significant joint effusion     Mild irregularity of the soft tissues of the anterior aspect of the knee and small ossific density inferior to the patella. Findings may represent a loose body or avulsion fracture fragment. Please correlate for potential a disruption of the extensor mechanism, patella tendon. Consider follow-up MRI to further evaluate as clinically indicated. LABS  I have reviewed all labs for this visit. No results found for this visit on 07/03/21.   XR KNEE RIGHT (3 VIEWS)    Result Date: 7/3/2021  EXAMINATION: THREE XRAY VIEWS OF THE RIGHT KNEE on 6/22/2021 by Dr. Sophie Son. X-ray shows expected postoperative findings. The incision itself appears clean dry intact and without dehiscence or bleeding. However there is now some wound erythema warmth and some tenderness mostly over the anterior knee. This distribution is not really consistent with a DVT. History is unclear if this is related to when the dog stepped on it yesterday morning as the patient did feel subjectively feverish on Wednesday with nausea and poor appetite which improved prior to the past 24 hours. He is afebrile here with stable vitals not consistent with sepsis. Concern is for hematoma vs infection. Limited ROM testing due to post-op status so I can't fully assess the leg extensor mechanism. Dr. Sophie Son from orthopedics was consulted about the patient's ED history, physical, workup, and course so far. Recommendations from this consultant included agreement with starting pt on PO abx, strict return precautions for worsening sx, and f/u on Wednesday as scheduled already. Patient was given scripts for the following medications. I counseled patient how to take these medications. New Prescriptions    DOXYCYCLINE HYCLATE (VIBRA-TABS) 100 MG TABLET    Take 1 tablet by mouth 2 times daily for 10 days         CLINICAL IMPRESSION  1. Cellulitis, wound, post-operative    2. H/O right knee surgery        Blood pressure 122/75, pulse 95, temperature 98.5 °F (36.9 °C), resp. rate 14, height 5' 9\" (1.753 m), weight 270 lb (122.5 kg), SpO2 98 %. DISPOSITION    I have discussed the findings of today's workup with the patient and addressed the patient's questions and concerns. Important warning signs as well as new or worsening symptoms which would necessitate immediate return to the ED were discussed. The plan is to discharge from the ED at this time, and the patient is in stable condition. The patient acknowledged understanding is agreeable with this plan.       Follow-up with:  Tutu Lopez DO  [address]  [phone]    Go on 7/7/2021  For wound re-check, For symptom re-evaluation, As already scheduled    Natali Nunez [id number]  [facility]  [phone]  Go to   If symptoms worsen      This chart was created using Dragon dictation software. Efforts were made by me to ensure accuracy, however some errors may be present due to limitations of this technology.         Castro Ingram MD  07/03/21 4502

## 2021-07-04 ENCOUNTER — APPOINTMENT (OUTPATIENT)
Dept: CT IMAGING | Age: 36
DRG: 857 | End: 2021-07-04
Payer: COMMERCIAL

## 2021-07-04 ENCOUNTER — HOSPITAL ENCOUNTER (INPATIENT)
Age: 36
LOS: 4 days | Discharge: HOME HEALTH | DRG: 857 | End: 2021-07-08
Attending: EMERGENCY MEDICINE
Payer: COMMERCIAL

## 2021-07-04 DIAGNOSIS — T81.42XA INFECTION OF DEEP INCISIONAL SURGICAL SITE AFTER PROCEDURE, INITIAL ENCOUNTER: ICD-10-CM

## 2021-07-04 DIAGNOSIS — T81.40XA POSTOPERATIVE INFECTION, UNSPECIFIED TYPE, INITIAL ENCOUNTER: ICD-10-CM

## 2021-07-04 DIAGNOSIS — L03.115 CELLULITIS OF RIGHT KNEE: Primary | ICD-10-CM

## 2021-07-04 LAB
ALBUMIN SERPL-MCNC: 3.7 GM/DL (ref 3.4–5)
ALP BLD-CCNC: 52 IU/L (ref 40–129)
ALT SERPL-CCNC: 19 U/L (ref 10–40)
ANION GAP SERPL CALCULATED.3IONS-SCNC: 10 MMOL/L (ref 4–16)
AST SERPL-CCNC: 12 IU/L (ref 15–37)
BASOPHILS ABSOLUTE: 0 K/CU MM
BASOPHILS RELATIVE PERCENT: 0.2 % (ref 0–1)
BILIRUB SERPL-MCNC: 0.3 MG/DL (ref 0–1)
BUN BLDV-MCNC: 18 MG/DL (ref 6–23)
CALCIUM SERPL-MCNC: 9.1 MG/DL (ref 8.3–10.6)
CHLORIDE BLD-SCNC: 97 MMOL/L (ref 99–110)
CO2: 26 MMOL/L (ref 21–32)
CREAT SERPL-MCNC: 0.7 MG/DL (ref 0.9–1.3)
DIFFERENTIAL TYPE: ABNORMAL
EOSINOPHILS ABSOLUTE: 0.1 K/CU MM
EOSINOPHILS RELATIVE PERCENT: 0.6 % (ref 0–3)
GFR AFRICAN AMERICAN: >60 ML/MIN/1.73M2
GFR NON-AFRICAN AMERICAN: >60 ML/MIN/1.73M2
GLUCOSE BLD-MCNC: 99 MG/DL (ref 70–99)
HCT VFR BLD CALC: 38 % (ref 42–52)
HEMOGLOBIN: 12.4 GM/DL (ref 13.5–18)
IMMATURE NEUTROPHIL %: 0.3 % (ref 0–0.43)
LACTATE: 0.7 MMOL/L (ref 0.4–2)
LYMPHOCYTES ABSOLUTE: 1.4 K/CU MM
LYMPHOCYTES RELATIVE PERCENT: 13.5 % (ref 24–44)
MCH RBC QN AUTO: 30.2 PG (ref 27–31)
MCHC RBC AUTO-ENTMCNC: 32.6 % (ref 32–36)
MCV RBC AUTO: 92.5 FL (ref 78–100)
MONOCYTES ABSOLUTE: 1.1 K/CU MM
MONOCYTES RELATIVE PERCENT: 10.3 % (ref 0–4)
NUCLEATED RBC %: 0 %
PDW BLD-RTO: 12.2 % (ref 11.7–14.9)
PLATELET # BLD: 368 K/CU MM (ref 140–440)
PMV BLD AUTO: 8.8 FL (ref 7.5–11.1)
POTASSIUM SERPL-SCNC: 4.3 MMOL/L (ref 3.5–5.1)
RBC # BLD: 4.11 M/CU MM (ref 4.6–6.2)
SEGMENTED NEUTROPHILS ABSOLUTE COUNT: 8.1 K/CU MM
SEGMENTED NEUTROPHILS RELATIVE PERCENT: 75.1 % (ref 36–66)
SODIUM BLD-SCNC: 133 MMOL/L (ref 135–145)
TOTAL IMMATURE NEUTOROPHIL: 0.03 K/CU MM
TOTAL NUCLEATED RBC: 0 K/CU MM
TOTAL PROTEIN: 7 GM/DL (ref 6.4–8.2)
WBC # BLD: 10.7 K/CU MM (ref 4–10.5)

## 2021-07-04 PROCEDURE — 2580000003 HC RX 258: Performed by: CLINICAL NURSE SPECIALIST

## 2021-07-04 PROCEDURE — 6370000000 HC RX 637 (ALT 250 FOR IP): Performed by: CLINICAL NURSE SPECIALIST

## 2021-07-04 PROCEDURE — 96365 THER/PROPH/DIAG IV INF INIT: CPT

## 2021-07-04 PROCEDURE — 36415 COLL VENOUS BLD VENIPUNCTURE: CPT

## 2021-07-04 PROCEDURE — 83605 ASSAY OF LACTIC ACID: CPT

## 2021-07-04 PROCEDURE — 2580000003 HC RX 258: Performed by: PHYSICIAN ASSISTANT

## 2021-07-04 PROCEDURE — 2580000003 HC RX 258: Performed by: INTERNAL MEDICINE

## 2021-07-04 PROCEDURE — 6370000000 HC RX 637 (ALT 250 FOR IP): Performed by: PHYSICIAN ASSISTANT

## 2021-07-04 PROCEDURE — 99284 EMERGENCY DEPT VISIT MOD MDM: CPT

## 2021-07-04 PROCEDURE — 6360000002 HC RX W HCPCS: Performed by: CLINICAL NURSE SPECIALIST

## 2021-07-04 PROCEDURE — 6360000004 HC RX CONTRAST MEDICATION: Performed by: ORTHOPAEDIC SURGERY

## 2021-07-04 PROCEDURE — 6360000002 HC RX W HCPCS: Performed by: PHYSICIAN ASSISTANT

## 2021-07-04 PROCEDURE — 73701 CT LOWER EXTREMITY W/DYE: CPT

## 2021-07-04 PROCEDURE — 87040 BLOOD CULTURE FOR BACTERIA: CPT

## 2021-07-04 PROCEDURE — 1200000000 HC SEMI PRIVATE

## 2021-07-04 PROCEDURE — 80053 COMPREHEN METABOLIC PANEL: CPT

## 2021-07-04 PROCEDURE — 85025 COMPLETE CBC W/AUTO DIFF WBC: CPT

## 2021-07-04 PROCEDURE — 96367 TX/PROPH/DG ADDL SEQ IV INF: CPT

## 2021-07-04 RX ORDER — HYDROCODONE BITARTRATE AND ACETAMINOPHEN 5; 325 MG/1; MG/1
1 TABLET ORAL ONCE
Status: COMPLETED | OUTPATIENT
Start: 2021-07-04 | End: 2021-07-04

## 2021-07-04 RX ORDER — SODIUM CHLORIDE 9 MG/ML
INJECTION, SOLUTION INTRAVENOUS CONTINUOUS
Status: ACTIVE | OUTPATIENT
Start: 2021-07-04 | End: 2021-07-05

## 2021-07-04 RX ORDER — ONDANSETRON 2 MG/ML
4 INJECTION INTRAMUSCULAR; INTRAVENOUS EVERY 6 HOURS PRN
Status: DISCONTINUED | OUTPATIENT
Start: 2021-07-04 | End: 2021-07-08 | Stop reason: HOSPADM

## 2021-07-04 RX ORDER — IBUPROFEN 600 MG/1
600 TABLET ORAL ONCE
Status: COMPLETED | OUTPATIENT
Start: 2021-07-04 | End: 2021-07-04

## 2021-07-04 RX ORDER — SODIUM CHLORIDE 0.9 % (FLUSH) 0.9 %
5-40 SYRINGE (ML) INJECTION PRN
Status: DISCONTINUED | OUTPATIENT
Start: 2021-07-04 | End: 2021-07-08 | Stop reason: HOSPADM

## 2021-07-04 RX ORDER — SODIUM CHLORIDE 9 MG/ML
INJECTION, SOLUTION INTRAVENOUS CONTINUOUS
Status: DISPENSED | OUTPATIENT
Start: 2021-07-04 | End: 2021-07-05

## 2021-07-04 RX ORDER — ACETAMINOPHEN 650 MG/1
650 SUPPOSITORY RECTAL EVERY 6 HOURS PRN
Status: DISCONTINUED | OUTPATIENT
Start: 2021-07-04 | End: 2021-07-08 | Stop reason: HOSPADM

## 2021-07-04 RX ORDER — POLYETHYLENE GLYCOL 3350 17 G/17G
17 POWDER, FOR SOLUTION ORAL DAILY PRN
Status: DISCONTINUED | OUTPATIENT
Start: 2021-07-04 | End: 2021-07-08 | Stop reason: HOSPADM

## 2021-07-04 RX ORDER — LISINOPRIL AND HYDROCHLOROTHIAZIDE 12.5; 1 MG/1; MG/1
1 TABLET ORAL DAILY
Status: DISCONTINUED | OUTPATIENT
Start: 2021-07-04 | End: 2021-07-08 | Stop reason: HOSPADM

## 2021-07-04 RX ORDER — ACETAMINOPHEN 325 MG/1
650 TABLET ORAL EVERY 6 HOURS PRN
Status: DISCONTINUED | OUTPATIENT
Start: 2021-07-04 | End: 2021-07-08 | Stop reason: HOSPADM

## 2021-07-04 RX ORDER — SODIUM CHLORIDE 0.9 % (FLUSH) 0.9 %
5-40 SYRINGE (ML) INJECTION EVERY 12 HOURS SCHEDULED
Status: DISCONTINUED | OUTPATIENT
Start: 2021-07-04 | End: 2021-07-08 | Stop reason: HOSPADM

## 2021-07-04 RX ORDER — SODIUM CHLORIDE 9 MG/ML
25 INJECTION, SOLUTION INTRAVENOUS PRN
Status: DISCONTINUED | OUTPATIENT
Start: 2021-07-04 | End: 2021-07-06

## 2021-07-04 RX ORDER — HYDROCODONE BITARTRATE AND ACETAMINOPHEN 5; 325 MG/1; MG/1
1 TABLET ORAL EVERY 6 HOURS PRN
Status: DISPENSED | OUTPATIENT
Start: 2021-07-04 | End: 2021-07-06

## 2021-07-04 RX ORDER — ONDANSETRON 4 MG/1
4 TABLET, ORALLY DISINTEGRATING ORAL EVERY 8 HOURS PRN
Status: DISCONTINUED | OUTPATIENT
Start: 2021-07-04 | End: 2021-07-08 | Stop reason: HOSPADM

## 2021-07-04 RX ADMIN — HYDROCODONE BITARTRATE AND ACETAMINOPHEN 1 TABLET: 5; 325 TABLET ORAL at 14:43

## 2021-07-04 RX ADMIN — HYDROCODONE BITARTRATE AND ACETAMINOPHEN 1 TABLET: 5; 325 TABLET ORAL at 23:28

## 2021-07-04 RX ADMIN — IOPAMIDOL 80 ML: 755 INJECTION, SOLUTION INTRAVENOUS at 19:46

## 2021-07-04 RX ADMIN — SODIUM CHLORIDE, PRESERVATIVE FREE 10 ML: 5 INJECTION INTRAVENOUS at 20:01

## 2021-07-04 RX ADMIN — CEFEPIME HYDROCHLORIDE 2000 MG: 2 INJECTION, POWDER, FOR SOLUTION INTRAVENOUS at 18:55

## 2021-07-04 RX ADMIN — CEFEPIME HYDROCHLORIDE 2000 MG: 2 INJECTION, POWDER, FOR SOLUTION INTRAVENOUS at 14:43

## 2021-07-04 RX ADMIN — SODIUM CHLORIDE: 9 INJECTION, SOLUTION INTRAVENOUS at 18:55

## 2021-07-04 RX ADMIN — LISINOPRIL AND HYDROCHLOROTHIAZIDE 1 TABLET: 12.5; 1 TABLET ORAL at 18:56

## 2021-07-04 RX ADMIN — IBUPROFEN 600 MG: 600 TABLET, FILM COATED ORAL at 14:42

## 2021-07-04 RX ADMIN — VANCOMYCIN 2000 MG: 1 INJECTION, SOLUTION INTRAVENOUS at 15:11

## 2021-07-04 RX ADMIN — ENOXAPARIN SODIUM 40 MG: 40 INJECTION SUBCUTANEOUS at 18:56

## 2021-07-04 ASSESSMENT — PAIN DESCRIPTION - ORIENTATION: ORIENTATION: RIGHT

## 2021-07-04 ASSESSMENT — PAIN DESCRIPTION - PAIN TYPE: TYPE: ACUTE PAIN

## 2021-07-04 ASSESSMENT — PAIN DESCRIPTION - DESCRIPTORS: DESCRIPTORS: ACHING;THROBBING

## 2021-07-04 ASSESSMENT — ENCOUNTER SYMPTOMS
VOMITING: 0
ABDOMINAL PAIN: 0
ABDOMINAL DISTENTION: 0
COUGH: 0
NAUSEA: 0
SHORTNESS OF BREATH: 0
COLOR CHANGE: 0
APNEA: 0
EYES NEGATIVE: 1
CHEST TIGHTNESS: 0

## 2021-07-04 ASSESSMENT — PAIN SCALES - GENERAL
PAINLEVEL_OUTOF10: 2
PAINLEVEL_OUTOF10: 4
PAINLEVEL_OUTOF10: 5
PAINLEVEL_OUTOF10: 0
PAINLEVEL_OUTOF10: 5
PAINLEVEL_OUTOF10: 8

## 2021-07-04 ASSESSMENT — PAIN DESCRIPTION - FREQUENCY: FREQUENCY: INTERMITTENT

## 2021-07-04 ASSESSMENT — PAIN DESCRIPTION - LOCATION: LOCATION: KNEE

## 2021-07-04 NOTE — H&P
History and Physical      Name:  Julienne Alfonso /Age/Sex: 1985  (28 y.o. male)   MRN & CSN:  [id number] & [id number] Admission Date/Time: 2021  1:22 PM   Location:  ED19/ED-19 PCP: Charleen Kocher, MD       Hospital Day: 1    Assessment and Plan:   Julienne Alfonso is a 28 y.o.  male  who presents with  worsening right knee pain and swellin and failed outpatient oral antibiotics    Assessment and plan     Cellulitis of knee, right :   · Inciting event: injured while playing softball on 2021 and had right patellar rupture and had surgery 2021  · Failed Outpatient antibiotic- doxycycline. · Positive warmth, redness, pain and swelling right knee   · Mild leukocytosis, afebrile, not tachycardic, lactate negative    S/p right patellar tendon repair 2021:  ·  Right postop wound staples intact, no drainage. ·   Positive warmth, redness, pain and swelling. Essential hypertension: BP normotensive continue home medication    Obesity: BMI 39.87 kg/m².   Lifestyle and dietary modifications recommended    Chronic conditions  Anxiety/depression: Continue Zoloft    Plan:  Pending blood culture  Continue cefepime 2 g every 12 hours  Continue vancomycin, first dose in ED, inpatient pharmacy consult for vancomycin dosing  0.9 normal saline at 75 cc/h x 1 day  Norco 5/325 1 tab every 6 hours as needed for pain  Inpatient orthopedic consult: Appreciate recommendation  Continue home medication lisinopril  CBC BMP daily    Diet  regular diet   DVT Prophylaxis [x] Lovenox, []  Heparin, [] SCDs, [] Ambulation   GI Prophylaxis [] PPI,  [] H2 Blocker,  [] Carafate,  [] Diet/Tube Feeds   Code Status  full code   Disposition Patient requires continued admission due to right knee cellulitis   MDM [] Low, [x] Moderate,[]  High  Patient's risk as above due to      History of Present Illness:     Chief Complaint:  right knee pain and worsening swelling  Julienne Alfonso is an obese BMI 39.87 kg/m2 28 y.o.  male with hx right patellar tendon rupture on 6/16/2021, right regular tendon repair 6/22/2021,  HTN, depression and anxiety  who presents with worsening right knee pain and swelling. Patient presents to the ER with right knee swelling, pain, redness, warmth status post tendon repair 6/22/2021 involving the right knee. Onset of the symptoms was 3 days ago. Inciting event: injured while playing softball on 6/16/2021 and had right patellar rupture and had surgery 6/22/2021. He was seen yesterday in the ED for right knee redness and swelling. He was discharged home on doxycycline, had 3 doses of  antibiotic with no improvement. Current symptoms include pain located right knee and swelling. Pain is aggravated by minimal movement. Patient has had prior knee problems. ED course: Afebrile, not tachycardic not tachypneic. Normal lactate, WBC 10.3. . Received vancomycin and cefepime first dose. Per chart review ED physician talked to Dr. Luc Orantes with orthopedic who follow up while inpatient. Review of Systems   Constitutional: Positive for chills. Negative for appetite change, diaphoresis and fatigue. Decreased appetite   HENT: Negative. Eyes: Negative. Respiratory: Negative for apnea, cough, chest tightness and shortness of breath. Cardiovascular: Negative for chest pain, palpitations and leg swelling. Gastrointestinal: Negative for abdominal distention, abdominal pain, nausea and vomiting. Genitourinary: Negative for difficulty urinating, hematuria and urgency. Musculoskeletal: Positive for gait problem and joint swelling. Right knee redness and pain   Skin: Positive for rash and wound. Negative for color change. Right knee wound with staples   Neurological: Negative for dizziness, tremors, weakness and numbness. Psychiatric/Behavioral: Negative for agitation and confusion. The patient is not nervous/anxious.     Ten point ROS reviewed negative, unless as noted above    Objective:   No intake or output data in the 24 hours ending 21 1530   Vitals:   Vitals:    21 1513   BP: (!) 147/78   Pulse: 89   Resp: 16   Temp: 98.5   SpO2: 97%     Physical Exam:   Physical Exam  Constitutional:       General: He is not in acute distress. Appearance: Normal appearance. He is obese. He is not ill-appearing. HENT:      Head: Normocephalic and atraumatic. Cardiovascular:      Rate and Rhythm: Regular rhythm. Tachycardia present. Pulses: Normal pulses. Heart sounds: Normal heart sounds. No murmur heard. No friction rub. No gallop. Comments: Peripheral pulse palpable and equal bilaterally  Pulmonary:      Effort: Pulmonary effort is normal.      Breath sounds: Normal breath sounds. Abdominal:      General: Abdomen is flat. Bowel sounds are normal.      Palpations: Abdomen is soft. Musculoskeletal:         General: Swelling present. Cervical back: Normal range of motion. Right knee: No swelling, erythema or ecchymosis. No tenderness. Right lower le+ Edema present. Comments: Staples right knee, no weight bearing    Skin:     General: Skin is warm and dry. Findings: Erythema and rash present. Neurological:      General: No focal deficit present. Mental Status: He is alert and oriented to person, place, and time. Mental status is at baseline. Cranial Nerves: No cranial nerve deficit. Psychiatric:         Mood and Affect: Mood normal.         Behavior: Behavior normal.         Right knee     Past Medical History:    History reviewed. No pertinent past medical history. PSHX:  has a past surgical history that includes Inguinal hernia repair (Left, ) and Patellar tendon repair (Right, 2021). Allergies: No Known Allergies    FAM HX: family history includes Cancer in his father; Other in his mother.   Soc HX:   Social History     Socioeconomic History    Marital status:      Spouse name: None    Number of children: None    Years of education: None    Highest education level: None   Occupational History    None   Tobacco Use    Smoking status: Never Smoker    Smokeless tobacco: Former User     Types: Chew   Vaping Use    Vaping Use: Never used   Substance and Sexual Activity    Alcohol use: Yes     Comment: Occasionally    Drug use: Never    Sexual activity: Yes     Partners: Female   Other Topics Concern    None   Social History Narrative    None     Social Determinants of Health     Financial Resource Strain: Low Risk     Difficulty of Paying Living Expenses: Not hard at all   Food Insecurity: No Food Insecurity    Worried About Running Out of Food in the Last Year: Never true    Noah of Food in the Last Year: Never true   Transportation Needs: No Transportation Needs    Lack of Transportation (Medical): No    Lack of Transportation (Non-Medical): No   Physical Activity:     Days of Exercise per Week:     Minutes of Exercise per Session:    Stress:     Feeling of Stress :    Social Connections:     Frequency of Communication with Friends and Family:     Frequency of Social Gatherings with Friends and Family:     Attends Mandaeism Services:     Active Member of Clubs or Organizations:     Attends Club or Organization Meetings:     Marital Status:    Intimate Partner Violence:     Fear of Current or Ex-Partner:     Emotionally Abused:     Physically Abused:     Sexually Abused:        Medications:   Medications:    vancomycin  2,000 mg Intravenous Once      No current facility-administered medications on file prior to encounter.      Current Outpatient Medications on File Prior to Encounter   Medication Sig Dispense Refill    doxycycline hyclate (VIBRA-TABS) 100 MG tablet Take 1 tablet by mouth 2 times daily for 10 days 20 tablet 0    meloxicam (MOBIC) 7.5 MG tablet take 1 tablet by mouth once daily with food      fluocinonide (LIDEX) 0.05 % external solution apply to rash

## 2021-07-04 NOTE — Clinical Note
Patient Class: Inpatient [101]   REQUIRED: Diagnosis: Cellulitis of right leg [698419]   Estimated Length of Stay: Estimated stay of more than 2 midnights   Admitting Provider: Cassidy Du [[id number]]

## 2021-07-04 NOTE — ED NOTES
Report called to UYEN Lopez on 1N and pt will be transported to room 1101 shortly     [place], RN  07/04/21 6453

## 2021-07-04 NOTE — ED TRIAGE NOTES
Pt to ED with complaints of worsening pain in right knee. Pt states he was seen in [place] ED yesterday and diagnosed with infection in surgical wound. Pt was told to come back to ED if pain worsened.

## 2021-07-04 NOTE — CONSULTS
[facility]  consulted by YOSEILN Cobos  for monitoring and adjustment. Indication for treatment: Cellulitis of the knee  Goal trough: 10-15 mcg/mL  AUC goal:  <500    Pertinent Laboratory Values:   Temp Readings from Last 3 Encounters:   07/04/21 99.6 °F (37.6 °C) (Oral)   07/03/21 98.5 °F (36.9 °C)   06/22/21 96.9 °F (36.1 °C)     Recent Labs     07/04/21  1400   WBC 10.7*   LACTATE 0.7     Recent Labs     07/04/21  1400   BUN 18   CREATININE 0.7*     Estimated Creatinine Clearance: 190 mL/min (A) (based on SCr of 0.7 mg/dL (L)). No intake or output data in the 24 hours ending 07/04/21 1722    Pertinent Cultures:  Date    Source    Results  7/4              Blood    Ordered             Vancomycin level:   TROUGH:  No results for input(s): VANCOTROUGH in the last 72 hours. RANDOM:  No results for input(s): VANCORANDOM in the last 72 hours. Assessment:  WBC and temperature: WBC slightly elevated, pt with a slight fever of 99.6  SCr, BUN, and urine output: SCR normal, no UOP data  Day(s) of therapy:  1  Vancomycin concentration: scheduled for 7/6 @20:30    Plan:  The patient received vancomycin 2000mg ivpb x1 dose earlier today  Start vancomycin 1750mg ivpb q18h tomorrow am.   Bayesian dosing predicts an AUC of 433 and a trough of 483  Check the vanco level before the 4th dose. Pharmacy will continue to monitor patient and adjust therapy as indicated    Caren 3 7/6 @20:30    Thank you for the consult. Jemal Billy, [facility]  7/4/2021 5:22 PM

## 2021-07-04 NOTE — ED PROVIDER NOTES
Patient Identification  Radha Womack is a 28 y.o. male    Chief Complaint  Knee Pain (right)      HPI  (History provided by patient)  This is a 28 y.o. male who was brought in by self for chief complaint of knee pain. Patient had right patellar tendon repair surgery performed on June 22 by Dr. Dl Oquendo. Surgery went well. Was doing well at home until 2 days ago when he began developing increasing pain and redness around his surgical wound. Was seen at [facility] ED yesterday and started on doxycycline after ED physician discussed patient with orthopedic surgeon. Patient states that he has taken 3 doses of doxycycline and pain and redness and swelling have worsened so he decided to come to the ED. No fevers but does endorse chills. States feels generally rundown for the last 3 or 4 days. No vomiting. No h/o DM. REVIEW OF SYSTEMS    Constitutional:  Denies fever, chills  HENT:  Denies sore throat or ear pain   Eyes: Denies vision changes, eye pain  Cardiovascular:  Denies chest pain, syncope  Respiratory:  Denies shortness of breath, cough   GI:  Denies abdominal pain, nausea, vomiting  :  Denies dysuria, discharge  Musculoskeletal:  Denies back pain.  + knee pain  Skin:  Denies pruritis.  + rash  Neurologic:  Denies headache, focal weakness, or sensory changes     See HPI and nursing notes for additional information     I have reviewed the following nursing documentation:  Allergies: No Known Allergies    Past medical history:  has no past medical history on file. Past surgical history:  has a past surgical history that includes Inguinal hernia repair (Left, 1986) and Patellar tendon repair (Right, 6/22/2021). Home medications:   Prior to Admission medications    Medication Sig Start Date End Date Taking?  Authorizing Provider   doxycycline hyclate (VIBRA-TABS) 100 MG tablet Take 1 tablet by mouth 2 times daily for 10 days 7/3/21 7/13/21  Bashir Orozco MD   meloxicam (MOBIC) 7.5 MG tablet take 1 tablet by mouth once daily with food 6/7/21   Historical Provider, MD   fluocinonide (LIDEX) 0.05 % external solution apply to rash ON scalp twice a day if needed for psoriasis FLARES 2/16/21   Historical Provider, MD   sertraline (ZOLOFT) 50 MG tablet Take 1 tablet by mouth daily 3/29/21 9/25/21  Oscar Hickey MD   lisinopril-hydroCHLOROthiazide LEYVA [facility]) 10-12.5 MG per tablet Take 1 tablet by mouth daily 3/29/21 6/22/21  Oscar Hickey MD       Social history:  reports that he has never smoked. He quit smokeless tobacco use about 4 years ago. His smokeless tobacco use included chew. He reports current alcohol use. He reports that he does not use drugs. Family history:    Family History   Problem Relation Age of Onset    Other Mother         Adriano's    Cancer Father         Testicular         Exam  BP (!) 147/78   Pulse 89   Temp 99.6 °F (37.6 °C) (Oral)   Resp 16   Ht 5' 9\" (1.753 m)   Wt 270 lb (122.5 kg)   SpO2 97%   BMI 39.87 kg/m²   Nursing note and vitals reviewed. Constitutional: Well developed, well nourished. No acute distress. HENT:      Head: Normocephalic and atraumatic. Ears: External ears normal.      Nose: Nose normal.     Mouth: Membrane mucosa moist and pink. No posterior oropharynx erythema or tonsillar edema  Eyes: Anicteric sclera. No discharge, PERRL  Neck: Supple. Trachea midline. Cardiovascular: RRR, no murmurs, rubs, or gallops, radial pulses 2+ bilaterally. Dorsalis pedis and posterior tibialis pulses are 2+ on the right. Pulmonary/Chest: Effort normal. No respiratory distress. CTAB. No stridor. No wheezes. No rales. Abdominal: Soft. Nontender to palpation. No distension. No guarding, rebound tenderness, or evidence of ascites. : No CVA tenderness. Musculoskeletal: There is a linear longitudinal surgical wound with staples in place noted over the right patella.   There is beefy edema and erythema noted over this area, has wound marker edges drawn from this morning and rash is clearly spread outside it and has some red streaking noted up the inner thigh. There is no inguinal lymphadenopathy noted. Range of motion of knee limited secondary to pain. Neurological: Alert and oriented to person, place, and time. Normal muscle tone. Sensation light touch intact throughout the right foot, strength intact in the right foot. Skin: Warm and dry. See above. Capillary refill brisk and intact in the right toes. Psychiatric: Normal mood and affect. Behavior is normal.      Radiographs (if obtained):  [] The following radiograph was interpreted by myself in the absence of a radiologist:   [x] Radiologist's Report Reviewed:  No orders to display      XR KNEE RIGHT (3 VIEWS)    Result Date: 7/3/2021  EXAMINATION: THREE XRAY VIEWS OF THE RIGHT KNEE 7/3/2021 12:45 pm COMPARISON: 06/16/2021 HISTORY: ORDERING SYSTEM PROVIDED HISTORY: post op pain TECHNOLOGIST PROVIDED HISTORY: Reason for exam:->post op pain Reason for Exam: post op pain Acuity: Acute Type of Exam: Initial Mechanism of Injury: reports his dog stepped on his knee yesterday Relevant Medical/Surgical History: Rt knee surgery on 6/22, rt knee swelling FINDINGS: Surgical skin staples are present anteriorly with soft tissue swelling. Improved alignment of the patella. No joint effusion. Joint spaces are maintained. No acute fracture. Postoperative changes with improved patellar alignment. Associated prepatellar soft tissue swelling. Otherwise no acute abnormality. XR KNEE RIGHT (MIN 4 VIEWS)    Result Date: 6/16/2021  EXAMINATION: FOUR XRAY VIEWS OF THE RIGHT KNEE 6/16/2021 8:15 pm COMPARISON: None.  HISTORY: ORDERING SYSTEM PROVIDED HISTORY: injury after knee \"buckled\" TECHNOLOGIST PROVIDED HISTORY: Reason for exam:->injury after knee \"buckled\" Reason for Exam: injury after knee \"buckled\" Acuity: Acute Type of Exam: Initial Mechanism of Injury: injury after knee \"buckled\" Relevant Medical/Surgical History: injury after knee \"buckled\" FINDINGS: Ossific density measuring approximately 5 mm projecting inferior to the patella along the patella tendon. This is age indeterminate. Soft tissue swelling and mild irregularity along the anterior soft tissues in expected position of the patella tendon. No significant joint effusion     Mild irregularity of the soft tissues of the anterior aspect of the knee and small ossific density inferior to the patella. Findings may represent a loose body or avulsion fracture fragment. Please correlate for potential a disruption of the extensor mechanism, patella tendon. Consider follow-up MRI to further evaluate as clinically indicated.        Labs  Results for orders placed or performed during the hospital encounter of 07/04/21   CBC Auto Differential   Result Value Ref Range    WBC 10.7 (H) 4.0 - 10.5 K/CU MM    RBC 4.11 (L) 4.6 - 6.2 M/CU MM    Hemoglobin 12.4 (L) 13.5 - 18.0 GM/DL    Hematocrit 38.0 (L) 42 - 52 %    MCV 92.5 78 - 100 FL    MCH 30.2 27 - 31 PG    MCHC 32.6 32.0 - 36.0 %    RDW 12.2 11.7 - 14.9 %    Platelets 647 930 - 049 K/CU MM    MPV 8.8 7.5 - 11.1 FL    Differential Type AUTOMATED DIFFERENTIAL     Segs Relative 75.1 (H) 36 - 66 %    Lymphocytes % 13.5 (L) 24 - 44 %    Monocytes % 10.3 (H) 0 - 4 %    Eosinophils % 0.6 0 - 3 %    Basophils % 0.2 0 - 1 %    Segs Absolute 8.1 K/CU MM    Lymphocytes Absolute 1.4 K/CU MM    Monocytes Absolute 1.1 K/CU MM    Eosinophils Absolute 0.1 K/CU MM    Basophils Absolute 0.0 K/CU MM    Nucleated RBC % 0.0 %    Total Nucleated RBC 0.0 K/CU MM    Total Immature Neutrophil 0.03 K/CU MM    Immature Neutrophil % 0.3 0 - 0.43 %   Comprehensive Metabolic Panel w/ Reflex to MG   Result Value Ref Range    Sodium 133 (L) 135 - 145 MMOL/L    Potassium 4.3 3.5 - 5.1 MMOL/L    Chloride 97 (L) 99 - 110 mMol/L    CO2 26 21 - 32 MMOL/L    BUN 18 6 - 23 MG/DL    CREATININE 0.7 (L) 0.9 - 1.3 MG/DL Glucose 99 70 - 99 MG/DL    Calcium 9.1 8.3 - 10.6 MG/DL    Albumin 3.7 3.4 - 5.0 GM/DL    Total Protein 7.0 6.4 - 8.2 GM/DL    Total Bilirubin 0.3 0.0 - 1.0 MG/DL    ALT 19 10 - 40 U/L    AST 12 (L) 15 - 37 IU/L    Alkaline Phosphatase 52 40 - 129 IU/L    GFR Non-African American >60 >60 mL/min/1.73m2    GFR African American >60 >60 mL/min/1.73m2    Anion Gap 10 4 - 16   Lactic Acid, Plasma   Result Value Ref Range    Lactate 0.7 0.4 - 2.0 mMOL/L         MDM  Patient presents for knee pain, rash. He appears to have cellulitis of his surgical wound. Was seen yesterday at another ED and started on doxycycline, has taken 3 doses without improvement. His cellulitis does appear to have spread outside of skin marker markings. His vital signs here are fairly unremarkable aside from mild hypertension. Laboratory work-up reveals slight leukocytosis. Patient started on cefepime and vancomycin. Spoke with Dr. Mackenzie Juarez with orthopedics who is agreeable with admission to hospitalist service. Spoke with IKER Mott who agreed to admit. This patient was also seen and evaluated by Dr. Chika Leong. Final Impression  1. Cellulitis of right knee    2. Postoperative infection, unspecified type, initial encounter        Blood pressure (!) 147/78, pulse 89, temperature 99.6 °F (37.6 °C), temperature source Oral, resp. rate 16, height 5' 9\" (1.753 m), weight 270 lb (122.5 kg), SpO2 97 %. Disposition:  Admit to med/surg floor in stable condition. Patient was given scripts for the following medications. I counseled patient how to take these medications. New Prescriptions    No medications on file       This chart was generated using the [address] [place] [address] 19[address]  CADsurfation system. I created this record but it may contain dictation errors given the limitations of this technology.        Fernando Vieyra PA-C  07/04/21 [id number]       Jennifer Vilchis PA-C  07/17/21 0600

## 2021-07-05 LAB
ANION GAP SERPL CALCULATED.3IONS-SCNC: 13 MMOL/L (ref 4–16)
BUN BLDV-MCNC: 13 MG/DL (ref 6–23)
CALCIUM SERPL-MCNC: 9.4 MG/DL (ref 8.3–10.6)
CHLORIDE BLD-SCNC: 97 MMOL/L (ref 99–110)
CO2: 28 MMOL/L (ref 21–32)
CREAT SERPL-MCNC: 0.8 MG/DL (ref 0.9–1.3)
GFR AFRICAN AMERICAN: >60 ML/MIN/1.73M2
GFR NON-AFRICAN AMERICAN: >60 ML/MIN/1.73M2
GLUCOSE BLD-MCNC: 110 MG/DL (ref 70–99)
HCT VFR BLD CALC: 39.2 % (ref 42–52)
HEMOGLOBIN: 13 GM/DL (ref 13.5–18)
HIGH SENSITIVE C-REACTIVE PROTEIN: 166.9 MG/L
MCH RBC QN AUTO: 30.5 PG (ref 27–31)
MCHC RBC AUTO-ENTMCNC: 33.2 % (ref 32–36)
MCV RBC AUTO: 92 FL (ref 78–100)
PDW BLD-RTO: 12.4 % (ref 11.7–14.9)
PLATELET # BLD: 380 K/CU MM (ref 140–440)
PMV BLD AUTO: 8.8 FL (ref 7.5–11.1)
POTASSIUM SERPL-SCNC: 4.5 MMOL/L (ref 3.5–5.1)
PROCALCITONIN: 0.08
RBC # BLD: 4.26 M/CU MM (ref 4.6–6.2)
SODIUM BLD-SCNC: 138 MMOL/L (ref 135–145)
WBC # BLD: 11.3 K/CU MM (ref 4–10.5)

## 2021-07-05 PROCEDURE — 6370000000 HC RX 637 (ALT 250 FOR IP): Performed by: CLINICAL NURSE SPECIALIST

## 2021-07-05 PROCEDURE — 99223 1ST HOSP IP/OBS HIGH 75: CPT | Performed by: INTERNAL MEDICINE

## 2021-07-05 PROCEDURE — 2580000003 HC RX 258: Performed by: NURSE PRACTITIONER

## 2021-07-05 PROCEDURE — 1200000000 HC SEMI PRIVATE

## 2021-07-05 PROCEDURE — 36415 COLL VENOUS BLD VENIPUNCTURE: CPT

## 2021-07-05 PROCEDURE — APPSS60 APP SPLIT SHARED TIME 46-60 MINUTES: Performed by: NURSE PRACTITIONER

## 2021-07-05 PROCEDURE — 84145 PROCALCITONIN (PCT): CPT

## 2021-07-05 PROCEDURE — 6360000002 HC RX W HCPCS: Performed by: NURSE PRACTITIONER

## 2021-07-05 PROCEDURE — 86141 C-REACTIVE PROTEIN HS: CPT

## 2021-07-05 PROCEDURE — 6360000002 HC RX W HCPCS: Performed by: CLINICAL NURSE SPECIALIST

## 2021-07-05 PROCEDURE — 85027 COMPLETE CBC AUTOMATED: CPT

## 2021-07-05 PROCEDURE — 80048 BASIC METABOLIC PNL TOTAL CA: CPT

## 2021-07-05 PROCEDURE — 2500000003 HC RX 250 WO HCPCS: Performed by: NURSE PRACTITIONER

## 2021-07-05 PROCEDURE — 2580000003 HC RX 258: Performed by: CLINICAL NURSE SPECIALIST

## 2021-07-05 PROCEDURE — 87081 CULTURE SCREEN ONLY: CPT

## 2021-07-05 PROCEDURE — 94761 N-INVAS EAR/PLS OXIMETRY MLT: CPT

## 2021-07-05 RX ADMIN — SODIUM CHLORIDE 25 ML: 9 INJECTION, SOLUTION INTRAVENOUS at 21:46

## 2021-07-05 RX ADMIN — ACETAMINOPHEN 650 MG: 325 TABLET ORAL at 13:36

## 2021-07-05 RX ADMIN — VANCOMYCIN HYDROCHLORIDE 1750 MG: 5 INJECTION, POWDER, LYOPHILIZED, FOR SOLUTION INTRAVENOUS at 08:55

## 2021-07-05 RX ADMIN — SERTRALINE HYDROCHLORIDE 50 MG: 50 TABLET ORAL at 08:55

## 2021-07-05 RX ADMIN — ENOXAPARIN SODIUM 40 MG: 40 INJECTION SUBCUTANEOUS at 08:56

## 2021-07-05 RX ADMIN — SODIUM CHLORIDE, PRESERVATIVE FREE 10 ML: 5 INJECTION INTRAVENOUS at 21:47

## 2021-07-05 RX ADMIN — CEFEPIME HYDROCHLORIDE 2000 MG: 2 INJECTION, POWDER, FOR SOLUTION INTRAVENOUS at 13:24

## 2021-07-05 RX ADMIN — CEFEPIME HYDROCHLORIDE 2000 MG: 2 INJECTION, POWDER, FOR SOLUTION INTRAVENOUS at 21:46

## 2021-07-05 RX ADMIN — SODIUM CHLORIDE, PRESERVATIVE FREE 10 ML: 5 INJECTION INTRAVENOUS at 08:55

## 2021-07-05 RX ADMIN — LISINOPRIL AND HYDROCHLOROTHIAZIDE 1 TABLET: 12.5; 1 TABLET ORAL at 08:55

## 2021-07-05 RX ADMIN — METRONIDAZOLE 500 MG: 500 INJECTION, SOLUTION INTRAVENOUS at 13:24

## 2021-07-05 RX ADMIN — ACETAMINOPHEN 650 MG: 325 TABLET ORAL at 21:55

## 2021-07-05 RX ADMIN — CEFEPIME HYDROCHLORIDE 2000 MG: 2 INJECTION, POWDER, FOR SOLUTION INTRAVENOUS at 05:42

## 2021-07-05 RX ADMIN — METRONIDAZOLE 500 MG: 500 INJECTION, SOLUTION INTRAVENOUS at 21:46

## 2021-07-05 RX ADMIN — HYDROCODONE BITARTRATE AND ACETAMINOPHEN 1 TABLET: 5; 325 TABLET ORAL at 05:43

## 2021-07-05 ASSESSMENT — PAIN SCALES - GENERAL
PAINLEVEL_OUTOF10: 5
PAINLEVEL_OUTOF10: 0
PAINLEVEL_OUTOF10: 7
PAINLEVEL_OUTOF10: 0
PAINLEVEL_OUTOF10: 0
PAINLEVEL_OUTOF10: 3
PAINLEVEL_OUTOF10: 0
PAINLEVEL_OUTOF10: 0

## 2021-07-05 ASSESSMENT — ENCOUNTER SYMPTOMS
COUGH: 0
VOMITING: 0
NAUSEA: 0
SHORTNESS OF BREATH: 0

## 2021-07-05 ASSESSMENT — PAIN DESCRIPTION - PAIN TYPE: TYPE: ACUTE PAIN

## 2021-07-05 NOTE — CONSULTS
Infectious Disease Consult Note  2021   Patient Name: Layton Anne : 1985   Impression   Right Knee Wound Infection with Abscess and Cellulitis, Possible OM:  · Low grade temps  · Mild leukocytosis  · -blood cultures pending  · -MRSA/MSSA pending  · -CT right knee W contrast: rim enhancing fluid collection centered about the prepatellar soft tissues measuring approx 1.8 x 8.4 x 10.9 cm containing gas. Findings highly suggesting of abscess. Small joint effusion present. Communication of the joint fluid and anterior rim enhancing collection cannot be excluded. Mild periosteal reaction along the patella. Nonspecific in the recent postop setting with OM not excluded given surrounding soft tissue changes. · Dr. Baljit Albarran consulted      HTN    · Morbid Obesity:  BMI 39.87     Multi-morbidity: per PMHx:  Left inguinal hernia repair, depression, HTN    Plan:   Continue IV cefepime 2 gm increase to q8h    Continue IV vancomycin per pharmacy dosing  · Start Flagyl 500 mg IV q8h    Trend CRP and Pct, ordered  · Await blood cultures  · Await MRSA screen  · Collaborate with Dr. Travon Hargrove of care, await surgical cultures    Thank you for allowing me to consult in the care of this patient.  ------------------------  REASON FOR CONSULT: Infective syndrome     Requested by: Dr. Ghazala Davis is a 28 y.o.  male who was admitted 2021 for further evaluation and management of worsening right knee pain and edema. He was diagnosed with a right patellar tendon rupture on 21 and underwent repair per Dr. Baljit Albarran. He now appears to have cellulitis. States he developed a fever/chills with wound pain and warmth on 21. He reports then on 7/3/21 his 80 pound dog accidentally stepped on the same area of his surgical wound worsening the wound. The CT shows an impression of an abscess and joint effusion.   He has been started on empiric IV ABX of cefepime and vancomycin. ? Infectious diseases service was consulted to evaluate the pt, and recommend further investigative and therapeutic measures. ROS: Other systems reviewed Including eyes, ENT, respiratory, cardiovascular, GI, , dermatologic, neurologic, psych, hem/lymphatic, musculoskeletal and endocrine were negative other than what is mentioned above. Patient Active Problem List    Diagnosis Date Noted    Cellulitis of knee, right 07/04/2021    Rupture of right patellar tendon     History of attention deficit disorder 09/28/2020    Essential hypertension 07/01/2019    Situational anxiety 07/01/2019     History reviewed. No pertinent past medical history. Past Surgical History:   Procedure Laterality Date    INGUINAL HERNIA REPAIR Left 1986    PATELLAR TENDON REPAIR Right 6/22/2021    RIGHT PATELLA TENDON REPAIR performed by Rosangela Cevallos DO at [facility] OR      Family History   Problem Relation Age of Onset    Other Mother         Adriano's    Cancer Father         Testicular      Infectious disease related family history - not contibutory. SOCIAL HISTORY  Social History     Tobacco Use    Smoking status: Never Smoker    Smokeless tobacco: Former User     Types: Chew   Substance Use Topics    Alcohol use: Yes     Comment: Occasionally       Born:[place], [place]   [address] [place] [address] with wife   Occupation: Works for the [employer]   No recent travel of significance.  No recent unusual exposures.  2 Large Dogs: Boxers  ? ALLERGIES  No Known Allergies   MEDICATIONS  Reviewed and are per the chart/EMR. ?   Antibiotics:   Present:  Cefepime 7/3-  Vancomycin 7/3-  Flagyl 7/5-    Past:    ?  -------------------------------------------------------------------------------------------------------------------    Vital Signs:  Vitals:    07/05/21 0845   BP: 135/83   Pulse: 99   Resp: 16   Temp: 98.5 °F (36.9 °C)   SpO2: 94%         Exam:    VS: noted; wt 270 lb (122.5 kg) Height 5'9\"  Gen: alert and oriented X3, no distress  Skin: no stigmata of endocarditis  Wounds: C/D/I right knee in knee immobilizer with knee incision with erythema, warmth and edema with staples intact, no drainage, with severe tenderness. HEMT: AT/NC Oropharynx pink, moist, and without lesions or exudates; dentition in good state of repair  Eyes: PERRLA, EOMI, conjunctiva pink, sclera anicteric. Neck: Supple. Trachea midline. No LAD. Chest: no distress and CTA. Good air movement. Room air. Heart: RRR and no MRG. Abd: soft, non-distended, no tenderness, no hepatomegaly. Normoactive bowel sounds. Ext: no clubbing, cyanosis, or edema  Neuro: Mental status intact. CN 2-12 intact and no focal sensory or motor deficits    ? Diagnostic Studies: reviewed  7/3/21 XR Knee Right:  Impression   Postoperative changes with improved patellar alignment.  Associated   prepatellar soft tissue swelling.       Otherwise no acute abnormality. 7/4/21 CT Knee Right W Contrast:  Impression   1. Rim enhancing fluid collection centered about the prepatellar soft tissues   measuring approximately 1.8 x 8.4 x 10.9 cm and containing gas.  Findings   highly suggestive of abscess.  A small joint effusion is also present. Communication of the joint fluid and anterior rim enhancing collection cannot   be excluded. 2. Mild periosteal reaction along the patella.  This is nonspecific in the   recent postoperative setting with osteomyelitis not excluded given   surrounding soft tissue changes. ??  I have examined this patient and available medical records on this date and have made the above observations, conclusions and recommendations. Electronically signed by: Electronically signed by YOSELIN Soni CNP on 7/5/2021 at 9:26 AM

## 2021-07-05 NOTE — PROGRESS NOTES
Progress Note  Date:2021       Room:1101/1101-A  Patient Zuleima Maria     YOB: 1985     Age:35 y.o. Seen and examined in the room. Reported mild pain on the right knee. Subjective    Subjective:  Symptoms:  Improved. No shortness of breath, malaise, cough, chest pain, weakness, headache or chest pressure. Diet:  Adequate intake. No nausea or vomiting. Activity level: Normal.    Pain:  He complains of pain that is mild. He reports pain is improving. Pain is well controlled. Review of Systems   Constitutional: Negative for fever. Respiratory: Negative for cough and shortness of breath. Cardiovascular: Negative for chest pain. Gastrointestinal: Negative for nausea and vomiting. Neurological: Negative for weakness. Objective         Vitals Last 24 Hours:  TEMPERATURE:  Temp  Av.3 °F (37.4 °C)  Min: 98.5 °F (36.9 °C)  Max: 99.6 °F (37.6 °C)  RESPIRATIONS RANGE: Resp  Av  Min: 16  Max: 30  PULSE OXIMETRY RANGE: SpO2  Av.1 %  Min: 94 %  Max: 100 %  PULSE RANGE: Pulse  Av.9  Min: 89  Max: 103  BLOOD PRESSURE RANGE: Systolic (88GRL), IQD:032 , Min:129 , RDM:988   ; Diastolic (47UJO), WRR:96, Min:72, Max:83    I/O (24Hr): Intake/Output Summary (Last 24 hours) at 2021 1103  Last data filed at 2021 0542  Gross per 24 hour   Intake 1200 ml   Output 3100 ml   Net -1900 ml     Objective:  General Appearance: In no acute distress. Vital signs: (most recent): Blood pressure 135/83, pulse 99, temperature 98.5 °F (36.9 °C), temperature source Oral, resp. rate 16, height 5' 9\" (1.753 m), weight 270 lb (122.5 kg), SpO2 94 %. Vital signs are normal.  No fever. Output: Producing urine and producing stool. Lungs:  Normal effort and normal respiratory rate. Heart: Normal rate. Regular rhythm. Abdomen: Abdomen is soft. Bowel sounds are normal.     Neurological: Patient is alert. Skin:  There is a rash. Labs/Imaging/Diagnostics    Labs:  CBC:  Recent Labs     07/04/21  1400 07/05/21  0802   WBC 10.7* 11.3*   RBC 4.11* 4.26*   HGB 12.4* 13.0*   HCT 38.0* 39.2*   MCV 92.5 92.0   RDW 12.2 12.4    380     CHEMISTRIES:  Recent Labs     07/04/21  1400 07/05/21  0802   * 138   K 4.3 4.5   CL 97* 97*   CO2 26 28   BUN 18 13   CREATININE 0.7* 0.8*   GLUCOSE 99 110*     PT/INR:No results for input(s): PROTIME, INR in the last 72 hours. APTT:No results for input(s): APTT in the last 72 hours. LIVER PROFILE:  Recent Labs     07/04/21  1400   AST 12*   ALT 19   BILITOT 0.3   ALKPHOS 52       Imaging Last 24 Hours:  XR KNEE RIGHT (3 VIEWS)    Result Date: 7/3/2021  EXAMINATION: THREE XRAY VIEWS OF THE RIGHT KNEE 7/3/2021 12:45 pm COMPARISON: 06/16/2021 HISTORY: ORDERING SYSTEM PROVIDED HISTORY: post op pain TECHNOLOGIST PROVIDED HISTORY: Reason for exam:->post op pain Reason for Exam: post op pain Acuity: Acute Type of Exam: Initial Mechanism of Injury: reports his dog stepped on his knee yesterday Relevant Medical/Surgical History: Rt knee surgery on 6/22, rt knee swelling FINDINGS: Surgical skin staples are present anteriorly with soft tissue swelling. Improved alignment of the patella. No joint effusion. Joint spaces are maintained. No acute fracture. Postoperative changes with improved patellar alignment. Associated prepatellar soft tissue swelling. Otherwise no acute abnormality. CT KNEE RIGHT W CONTRAST    Result Date: 7/4/2021  EXAMINATION: CT OF THE RIGHT KNEE WITH CONTRAST 7/4/2021 7:45 pm TECHNIQUE: CT of the right knee was performed with the administration of intravenous contrast.  Multiplanar reformatted images are provided for review. Dose modulation, iterative reconstruction, and/or weight based adjustment of the mA/kV was utilized to reduce the radiation dose to as low as reasonably achievable.  COMPARISON: Right knee radiograph July 3, 2021; right knee radiograph June 16, 2020 HISTORY ORDERING SYSTEM PROVIDED HISTORY: Infection, rule out abscess TECHNOLOGIST PROVIDED HISTORY: Reason for exam:->Infection, rule out abscess Reason for Exam: Infection, rule out abscess Acuity: Acute Type of Exam: Initial Additional signs and symptoms: no Relevant Medical/Surgical History: 80 ml isovue 370 used Surgery June 22, 2021 FINDINGS: Bones: No acute fracture identified. Postoperative changes of the extensor mechanism and patella noted with several linear surgical tracts extending through the patella. Mild periosteal reaction of the patella. This is nonspecific, however, osteomyelitis cannot be excluded given surrounding soft tissue changes. Soft Tissue: Prominent subcutaneous edema throughout the soft tissues of the right knee which is more pronounced anteriorly. There is a rim enhancing fluid collection containing gas centered within the prepatellar soft tissues which measures approximately 1.3 x 8.4 x 10.9 cm. The collection appears to extend to the level of the midline surgical staple line. Joint: Anatomic alignment of the knee. Small knee effusion present. Communication of the knee effusion with the anterior soft tissue collection cannot be excluded. 1. Rim enhancing fluid collection centered about the prepatellar soft tissues measuring approximately 1.8 x 8.4 x 10.9 cm and containing gas. Findings highly suggestive of abscess. A small joint effusion is also present. Communication of the joint fluid and anterior rim enhancing collection cannot be excluded. 2. Mild periosteal reaction along the patella. This is nonspecific in the recent postoperative setting with osteomyelitis not excluded given surrounding soft tissue changes.      Assessment//Plan           Hospital Problems         Last Modified POA    Cellulitis of right knee 7/5/2021 Yes        Assessment & Plan  Mimi Sotelo is a 28 y.o.  male  who presents with  worsening right knee pain and swellin and failed outpatient oral antibiotics     #  Cellulitis/abscess of right knee  Status post right patella tendon rupture and repair 06/22/2021. On oral doxycycline after discharge. CT of right knee showed possible abscess and cellulitis. Continue cefepime and vancomycin. Orthopedics Dr. Usha Benz and ID were consulted, appreciate help. #  Essential hypertension  BP controlled continue home medication. #  Obesity  Weight control discussed with patient. #  Anxiety/depression  Continue home medication. DVT prophylaxis: Lovenox subcutaneous. GI prophylaxis: not indicated. CODE STATUS: full.     Electronically signed by YOSELIN Hoff CNP on 7/5/21 at 11:03 AM EDT

## 2021-07-06 ENCOUNTER — ANESTHESIA (OUTPATIENT)
Dept: OPERATING ROOM | Age: 36
DRG: 857 | End: 2021-07-06
Payer: COMMERCIAL

## 2021-07-06 ENCOUNTER — ANESTHESIA EVENT (OUTPATIENT)
Dept: OPERATING ROOM | Age: 36
DRG: 857 | End: 2021-07-06
Payer: COMMERCIAL

## 2021-07-06 VITALS
DIASTOLIC BLOOD PRESSURE: 60 MMHG | SYSTOLIC BLOOD PRESSURE: 99 MMHG | RESPIRATION RATE: 5 BRPM | OXYGEN SATURATION: 99 % | TEMPERATURE: 98.6 F

## 2021-07-06 LAB
CREAT SERPL-MCNC: 0.8 MG/DL (ref 0.9–1.3)
DOSE AMOUNT: ABNORMAL
DOSE TIME: ABNORMAL
GFR AFRICAN AMERICAN: >60 ML/MIN/1.73M2
GFR NON-AFRICAN AMERICAN: >60 ML/MIN/1.73M2
SARS-COV-2, NAAT: NOT DETECTED
SOURCE: NORMAL
VANCOMYCIN TROUGH: 4 UG/ML (ref 10–20)

## 2021-07-06 PROCEDURE — 3600000012 HC SURGERY LEVEL 2 ADDTL 15MIN: Performed by: ORTHOPAEDIC SURGERY

## 2021-07-06 PROCEDURE — 2580000003 HC RX 258: Performed by: ORTHOPAEDIC SURGERY

## 2021-07-06 PROCEDURE — 87077 CULTURE AEROBIC IDENTIFY: CPT

## 2021-07-06 PROCEDURE — 87186 SC STD MICRODIL/AGAR DIL: CPT

## 2021-07-06 PROCEDURE — 2709999900 HC NON-CHARGEABLE SUPPLY: Performed by: ORTHOPAEDIC SURGERY

## 2021-07-06 PROCEDURE — 2500000003 HC RX 250 WO HCPCS: Performed by: NURSE PRACTITIONER

## 2021-07-06 PROCEDURE — 6360000002 HC RX W HCPCS: Performed by: ORTHOPAEDIC SURGERY

## 2021-07-06 PROCEDURE — 3600000002 HC SURGERY LEVEL 2 BASE: Performed by: ORTHOPAEDIC SURGERY

## 2021-07-06 PROCEDURE — 0J9N0ZZ DRAINAGE OF RIGHT LOWER LEG SUBCUTANEOUS TISSUE AND FASCIA, OPEN APPROACH: ICD-10-PCS | Performed by: ORTHOPAEDIC SURGERY

## 2021-07-06 PROCEDURE — 2580000003 HC RX 258: Performed by: CLINICAL NURSE SPECIALIST

## 2021-07-06 PROCEDURE — 82565 ASSAY OF CREATININE: CPT

## 2021-07-06 PROCEDURE — 94761 N-INVAS EAR/PLS OXIMETRY MLT: CPT

## 2021-07-06 PROCEDURE — 2580000003 HC RX 258: Performed by: NURSE ANESTHETIST, CERTIFIED REGISTERED

## 2021-07-06 PROCEDURE — 7100000001 HC PACU RECOVERY - ADDTL 15 MIN: Performed by: ORTHOPAEDIC SURGERY

## 2021-07-06 PROCEDURE — 6360000002 HC RX W HCPCS: Performed by: NURSE PRACTITIONER

## 2021-07-06 PROCEDURE — 87070 CULTURE OTHR SPECIMN AEROBIC: CPT

## 2021-07-06 PROCEDURE — 1200000000 HC SEMI PRIVATE

## 2021-07-06 PROCEDURE — 2580000003 HC RX 258: Performed by: NURSE PRACTITIONER

## 2021-07-06 PROCEDURE — 80202 ASSAY OF VANCOMYCIN: CPT

## 2021-07-06 PROCEDURE — 27301 DRAIN THIGH/KNEE LESION: CPT | Performed by: ORTHOPAEDIC SURGERY

## 2021-07-06 PROCEDURE — 87205 SMEAR GRAM STAIN: CPT

## 2021-07-06 PROCEDURE — 3700000000 HC ANESTHESIA ATTENDED CARE: Performed by: ORTHOPAEDIC SURGERY

## 2021-07-06 PROCEDURE — 6360000002 HC RX W HCPCS: Performed by: CLINICAL NURSE SPECIALIST

## 2021-07-06 PROCEDURE — 0JBN0ZZ EXCISION OF RIGHT LOWER LEG SUBCUTANEOUS TISSUE AND FASCIA, OPEN APPROACH: ICD-10-PCS | Performed by: ORTHOPAEDIC SURGERY

## 2021-07-06 PROCEDURE — 87635 SARS-COV-2 COVID-19 AMP PRB: CPT

## 2021-07-06 PROCEDURE — 6370000000 HC RX 637 (ALT 250 FOR IP): Performed by: ORTHOPAEDIC SURGERY

## 2021-07-06 PROCEDURE — 7100000000 HC PACU RECOVERY - FIRST 15 MIN: Performed by: ORTHOPAEDIC SURGERY

## 2021-07-06 PROCEDURE — 6360000002 HC RX W HCPCS: Performed by: ANESTHESIOLOGY

## 2021-07-06 PROCEDURE — 2500000003 HC RX 250 WO HCPCS: Performed by: ORTHOPAEDIC SURGERY

## 2021-07-06 PROCEDURE — 36415 COLL VENOUS BLD VENIPUNCTURE: CPT

## 2021-07-06 PROCEDURE — 6360000002 HC RX W HCPCS: Performed by: NURSE ANESTHETIST, CERTIFIED REGISTERED

## 2021-07-06 PROCEDURE — 3700000001 HC ADD 15 MINUTES (ANESTHESIA): Performed by: ORTHOPAEDIC SURGERY

## 2021-07-06 RX ORDER — PROPOFOL 10 MG/ML
INJECTION, EMULSION INTRAVENOUS PRN
Status: DISCONTINUED | OUTPATIENT
Start: 2021-07-06 | End: 2021-07-06 | Stop reason: SDUPTHER

## 2021-07-06 RX ORDER — SODIUM CHLORIDE, SODIUM LACTATE, POTASSIUM CHLORIDE, CALCIUM CHLORIDE 600; 310; 30; 20 MG/100ML; MG/100ML; MG/100ML; MG/100ML
INJECTION, SOLUTION INTRAVENOUS CONTINUOUS PRN
Status: DISCONTINUED | OUTPATIENT
Start: 2021-07-06 | End: 2021-07-06 | Stop reason: SDUPTHER

## 2021-07-06 RX ORDER — FENTANYL CITRATE 50 UG/ML
50 INJECTION, SOLUTION INTRAMUSCULAR; INTRAVENOUS EVERY 5 MIN PRN
Status: DISCONTINUED | OUTPATIENT
Start: 2021-07-06 | End: 2021-07-06 | Stop reason: HOSPADM

## 2021-07-06 RX ORDER — SODIUM CHLORIDE 9 MG/ML
25 INJECTION, SOLUTION INTRAVENOUS PRN
Status: DISCONTINUED | OUTPATIENT
Start: 2021-07-06 | End: 2021-07-08 | Stop reason: HOSPADM

## 2021-07-06 RX ORDER — HYDRALAZINE HYDROCHLORIDE 20 MG/ML
5 INJECTION INTRAMUSCULAR; INTRAVENOUS EVERY 10 MIN PRN
Status: DISCONTINUED | OUTPATIENT
Start: 2021-07-06 | End: 2021-07-06 | Stop reason: HOSPADM

## 2021-07-06 RX ORDER — FENTANYL CITRATE 50 UG/ML
INJECTION, SOLUTION INTRAMUSCULAR; INTRAVENOUS PRN
Status: DISCONTINUED | OUTPATIENT
Start: 2021-07-06 | End: 2021-07-06 | Stop reason: SDUPTHER

## 2021-07-06 RX ORDER — ONDANSETRON 2 MG/ML
4 INJECTION INTRAMUSCULAR; INTRAVENOUS
Status: DISCONTINUED | OUTPATIENT
Start: 2021-07-06 | End: 2021-07-06 | Stop reason: HOSPADM

## 2021-07-06 RX ORDER — SODIUM CHLORIDE, SODIUM LACTATE, POTASSIUM CHLORIDE, CALCIUM CHLORIDE 600; 310; 30; 20 MG/100ML; MG/100ML; MG/100ML; MG/100ML
INJECTION, SOLUTION INTRAVENOUS CONTINUOUS
Status: DISCONTINUED | OUTPATIENT
Start: 2021-07-06 | End: 2021-07-08

## 2021-07-06 RX ORDER — FENTANYL CITRATE 50 UG/ML
25 INJECTION, SOLUTION INTRAMUSCULAR; INTRAVENOUS EVERY 5 MIN PRN
Status: DISCONTINUED | OUTPATIENT
Start: 2021-07-06 | End: 2021-07-06 | Stop reason: HOSPADM

## 2021-07-06 RX ORDER — OXYCODONE HYDROCHLORIDE AND ACETAMINOPHEN 5; 325 MG/1; MG/1
1 TABLET ORAL EVERY 6 HOURS PRN
Status: DISCONTINUED | OUTPATIENT
Start: 2021-07-06 | End: 2021-07-08 | Stop reason: HOSPADM

## 2021-07-06 RX ORDER — MAGNESIUM HYDROXIDE 1200 MG/15ML
LIQUID ORAL CONTINUOUS PRN
Status: COMPLETED | OUTPATIENT
Start: 2021-07-06 | End: 2021-07-06

## 2021-07-06 RX ORDER — LIDOCAINE HYDROCHLORIDE 20 MG/ML
INJECTION, SOLUTION INTRAVENOUS PRN
Status: DISCONTINUED | OUTPATIENT
Start: 2021-07-06 | End: 2021-07-06 | Stop reason: SDUPTHER

## 2021-07-06 RX ORDER — ONDANSETRON 2 MG/ML
INJECTION INTRAMUSCULAR; INTRAVENOUS PRN
Status: DISCONTINUED | OUTPATIENT
Start: 2021-07-06 | End: 2021-07-06 | Stop reason: SDUPTHER

## 2021-07-06 RX ORDER — SODIUM CHLORIDE 0.9 % (FLUSH) 0.9 %
10 SYRINGE (ML) INJECTION PRN
Status: DISCONTINUED | OUTPATIENT
Start: 2021-07-06 | End: 2021-07-08 | Stop reason: HOSPADM

## 2021-07-06 RX ORDER — LABETALOL HYDROCHLORIDE 5 MG/ML
5 INJECTION, SOLUTION INTRAVENOUS EVERY 10 MIN PRN
Status: DISCONTINUED | OUTPATIENT
Start: 2021-07-06 | End: 2021-07-06 | Stop reason: HOSPADM

## 2021-07-06 RX ORDER — DEXAMETHASONE SODIUM PHOSPHATE 4 MG/ML
INJECTION, SOLUTION INTRA-ARTICULAR; INTRALESIONAL; INTRAMUSCULAR; INTRAVENOUS; SOFT TISSUE PRN
Status: DISCONTINUED | OUTPATIENT
Start: 2021-07-06 | End: 2021-07-06 | Stop reason: SDUPTHER

## 2021-07-06 RX ORDER — SODIUM CHLORIDE 0.9 % (FLUSH) 0.9 %
10 SYRINGE (ML) INJECTION EVERY 12 HOURS SCHEDULED
Status: DISCONTINUED | OUTPATIENT
Start: 2021-07-06 | End: 2021-07-08 | Stop reason: HOSPADM

## 2021-07-06 RX ADMIN — DEXAMETHASONE SODIUM PHOSPHATE 4 MG: 4 INJECTION, SOLUTION INTRAMUSCULAR; INTRAVENOUS at 13:25

## 2021-07-06 RX ADMIN — PROPOFOL 200 MG: 10 INJECTION, EMULSION INTRAVENOUS at 13:05

## 2021-07-06 RX ADMIN — VANCOMYCIN HYDROCHLORIDE 1750 MG: 5 INJECTION, POWDER, LYOPHILIZED, FOR SOLUTION INTRAVENOUS at 22:31

## 2021-07-06 RX ADMIN — FENTANYL CITRATE 50 MCG: 50 INJECTION, SOLUTION INTRAMUSCULAR; INTRAVENOUS at 14:30

## 2021-07-06 RX ADMIN — LISINOPRIL AND HYDROCHLOROTHIAZIDE 1 TABLET: 12.5; 1 TABLET ORAL at 16:09

## 2021-07-06 RX ADMIN — CEFEPIME HYDROCHLORIDE 2000 MG: 2 INJECTION, POWDER, FOR SOLUTION INTRAVENOUS at 21:50

## 2021-07-06 RX ADMIN — CEFEPIME HYDROCHLORIDE 2000 MG: 2 INJECTION, POWDER, FOR SOLUTION INTRAVENOUS at 06:48

## 2021-07-06 RX ADMIN — FENTANYL CITRATE 50 MCG: 50 INJECTION, SOLUTION INTRAMUSCULAR; INTRAVENOUS at 13:13

## 2021-07-06 RX ADMIN — SERTRALINE HYDROCHLORIDE 50 MG: 50 TABLET ORAL at 16:09

## 2021-07-06 RX ADMIN — METRONIDAZOLE 500 MG: 500 INJECTION, SOLUTION INTRAVENOUS at 17:48

## 2021-07-06 RX ADMIN — ONDANSETRON 4 MG: 2 INJECTION INTRAMUSCULAR; INTRAVENOUS at 13:25

## 2021-07-06 RX ADMIN — SODIUM CHLORIDE, POTASSIUM CHLORIDE, SODIUM LACTATE AND CALCIUM CHLORIDE: 600; 310; 30; 20 INJECTION, SOLUTION INTRAVENOUS at 12:55

## 2021-07-06 RX ADMIN — FENTANYL CITRATE 25 MCG: 50 INJECTION, SOLUTION INTRAMUSCULAR; INTRAVENOUS at 13:49

## 2021-07-06 RX ADMIN — LIDOCAINE HYDROCHLORIDE 100 MG: 20 INJECTION, SOLUTION INTRAVENOUS at 13:05

## 2021-07-06 RX ADMIN — FENTANYL CITRATE 50 MCG: 50 INJECTION, SOLUTION INTRAMUSCULAR; INTRAVENOUS at 14:40

## 2021-07-06 RX ADMIN — FENTANYL CITRATE 100 MCG: 50 INJECTION, SOLUTION INTRAMUSCULAR; INTRAVENOUS at 13:05

## 2021-07-06 RX ADMIN — METRONIDAZOLE 500 MG: 500 INJECTION, SOLUTION INTRAVENOUS at 10:18

## 2021-07-06 RX ADMIN — SODIUM CHLORIDE, PRESERVATIVE FREE 10 ML: 5 INJECTION INTRAVENOUS at 21:52

## 2021-07-06 RX ADMIN — VANCOMYCIN HYDROCHLORIDE 1750 MG: 5 INJECTION, POWDER, LYOPHILIZED, FOR SOLUTION INTRAVENOUS at 04:34

## 2021-07-06 RX ADMIN — FENTANYL CITRATE 25 MCG: 50 INJECTION, SOLUTION INTRAMUSCULAR; INTRAVENOUS at 13:23

## 2021-07-06 RX ADMIN — HYDROCODONE BITARTRATE AND ACETAMINOPHEN 1 TABLET: 5; 325 TABLET ORAL at 15:49

## 2021-07-06 RX ADMIN — METRONIDAZOLE 500 MG: 500 INJECTION, SOLUTION INTRAVENOUS at 02:01

## 2021-07-06 RX ADMIN — CEFEPIME HYDROCHLORIDE 2000 MG: 2 INJECTION, POWDER, FOR SOLUTION INTRAVENOUS at 13:35

## 2021-07-06 ASSESSMENT — PAIN DESCRIPTION - PROGRESSION
CLINICAL_PROGRESSION: RAPIDLY IMPROVING
CLINICAL_PROGRESSION: GRADUALLY WORSENING

## 2021-07-06 ASSESSMENT — PULMONARY FUNCTION TESTS
PIF_VALUE: 18
PIF_VALUE: 19
PIF_VALUE: 0
PIF_VALUE: 20
PIF_VALUE: 12
PIF_VALUE: 19
PIF_VALUE: 0
PIF_VALUE: 18
PIF_VALUE: 13
PIF_VALUE: 19
PIF_VALUE: 19
PIF_VALUE: 12
PIF_VALUE: 14
PIF_VALUE: 19
PIF_VALUE: 18
PIF_VALUE: 19
PIF_VALUE: 10
PIF_VALUE: 19
PIF_VALUE: 18
PIF_VALUE: 18
PIF_VALUE: 19
PIF_VALUE: 1
PIF_VALUE: 18
PIF_VALUE: 1
PIF_VALUE: 19
PIF_VALUE: 0
PIF_VALUE: 11
PIF_VALUE: 20
PIF_VALUE: 19
PIF_VALUE: 20
PIF_VALUE: 19
PIF_VALUE: 18
PIF_VALUE: 18
PIF_VALUE: 19
PIF_VALUE: 11
PIF_VALUE: 1
PIF_VALUE: 1
PIF_VALUE: 19
PIF_VALUE: 18
PIF_VALUE: 19
PIF_VALUE: 19
PIF_VALUE: 18
PIF_VALUE: 18
PIF_VALUE: 19
PIF_VALUE: 18
PIF_VALUE: 19
PIF_VALUE: 19
PIF_VALUE: 20
PIF_VALUE: 19
PIF_VALUE: 9
PIF_VALUE: 1
PIF_VALUE: 20
PIF_VALUE: 19
PIF_VALUE: 18
PIF_VALUE: 18
PIF_VALUE: 1
PIF_VALUE: 2
PIF_VALUE: 19
PIF_VALUE: 4
PIF_VALUE: 18
PIF_VALUE: 19

## 2021-07-06 ASSESSMENT — PAIN - FUNCTIONAL ASSESSMENT
PAIN_FUNCTIONAL_ASSESSMENT: PREVENTS OR INTERFERES SOME ACTIVE ACTIVITIES AND ADLS

## 2021-07-06 ASSESSMENT — ENCOUNTER SYMPTOMS
SHORTNESS OF BREATH: 0
COLOR CHANGE: 1
COUGH: 0
NAUSEA: 0
VOMITING: 0
CHEST TIGHTNESS: 0
BACK PAIN: 0

## 2021-07-06 ASSESSMENT — PAIN DESCRIPTION - ORIENTATION
ORIENTATION: RIGHT

## 2021-07-06 ASSESSMENT — PAIN DESCRIPTION - DESCRIPTORS: DESCRIPTORS: ACHING;DISCOMFORT

## 2021-07-06 ASSESSMENT — PAIN DESCRIPTION - FREQUENCY: FREQUENCY: CONTINUOUS

## 2021-07-06 ASSESSMENT — PAIN DESCRIPTION - PAIN TYPE
TYPE: SURGICAL PAIN

## 2021-07-06 ASSESSMENT — PAIN DESCRIPTION - LOCATION
LOCATION: KNEE

## 2021-07-06 ASSESSMENT — PAIN SCALES - GENERAL
PAINLEVEL_OUTOF10: 9
PAINLEVEL_OUTOF10: 9
PAINLEVEL_OUTOF10: 8
PAINLEVEL_OUTOF10: 6
PAINLEVEL_OUTOF10: 4
PAINLEVEL_OUTOF10: 4

## 2021-07-06 ASSESSMENT — PAIN DESCRIPTION - ONSET: ONSET: GRADUAL

## 2021-07-06 NOTE — CARE COORDINATION
Chart reviewed and screened for possible needs at discharge. Pt lives at home with his spouse and was independent PTA. Pt has PCP and insurance to help w/ RX's. CM available for possible needs at discharge.

## 2021-07-06 NOTE — PLAN OF CARE
[facility]    [address]    [place] [address]    Phone:  [phone]    Fax:  [phone]       Thank You for choosing us for your health care visit. We are glad to serve you and happy to provide you with this summary of your visit. Please help us to ensure we have accurate records. If you find anything that needs to be changed, please let our staff know as soon as possible.          Your Demographic Information     Patient Name Sex En Quezada Male 1979       Ethnic Group Patient Race    Not of  or  Origin White      Your Visit Details     Date & Time Provider Department    2017 4:45 PM Andrea Stearns MD [facility]      Your To Do List     Follow-Up    Return if symptoms worsen or fail to improve.      We Ordered or Performed the Following     SERVICE TO UROLOGY       Conditions Discussed Today or Order-Related Diagnoses        Comments    Acute bacterial bronchitis    -  Primary     Visit for vasectomy evaluation           Your Vitals Were     BP Pulse Temp Weight BMI Smoking Status    134/79 67 97.9 °F (36.6 °C) (Oral) 196 lb 4 oz (89 kg) 31.68 kg/m2 Former Smoker      Medications Prescribed or Re-Ordered Today     azithromycin (ZITHROMAX Z-NIALL) 250 MG tablet    Si tablets day one, then 1 tablet days 2-5    Class: Eprescribe    Pharmacy: [facility] [address] [place] [address] AT [facility] Ph #: [phone]    albuterol 108 (90 BASE) MCG/ACT inhaler    Sig - Route: Inhale 2 puffs into the lungs every 4 hours as needed for Shortness of Breath or Wheezing. - Inhalation    Class: Eprescribe    Pharmacy: [facility] [address] [place] [address] AT [place] [facility] Ph #: [phone]      Your Current Medications Are        Disp Refills Start End    azithromycin (ZITHROMAX Z-NIALL) 250 MG tablet 6 tablet 0  Problem: Falls - Risk of:  Goal: Will remain free from falls  Description: Will remain free from falls  Outcome: Ongoing  Goal: Absence of physical injury  Description: Absence of physical injury  Outcome: Ongoing     Problem: Pain:  Goal: Pain level will decrease  Description: Pain level will decrease  Outcome: Ongoing  Goal: Control of acute pain  Description: Control of acute pain  Outcome: Ongoing  Goal: Control of chronic pain  Description: Control of chronic pain  Outcome: Ongoing 2017    Si tablets day one, then 1 tablet days 2-5    Class: Eprescribe    albuterol 108 (90 BASE) MCG/ACT inhaler 1 Inhaler 0 2017     Sig - Route: Inhale 2 puffs into the lungs every 4 hours as needed for Shortness of Breath or Wheezing. - Inhalation    Class: Eprescribe      Discontinued Medications        Reason for Discontinue    ranitidine (ZANTAC) 150 MG tablet Not Needed      Allergies     Penicillins Other (See Comments)    Unknown reaction. Positive allergy test       Immunizations History as of 2017     Name Date    Influenza 10/1/2016    Tdap 2012              Patient Instructions    Bk,     You have a bronchitis. I suggest using the inhaler that was prescribed 2 puffs every 4-6 hours as needed.   Take the Azithromycin, an antibiotic, 2 tablets by mouth today, then 1 per day for 4 more days.      I recommend Dr. Vazquez or Dr. Olivier, urologists at the [facility] .

## 2021-07-06 NOTE — ANESTHESIA PRE PROCEDURE
Department of Anesthesiology  Preprocedure Note       Name:  Lela Polo   Age:  28 y.o.  :  1985                                          MRN:  [id number]         Date:  2021      Surgeon: Rachell Messer):  Frida Kenny DO    Procedure: Procedure(s):  RIGHT KNEE DEBRIDEMENT INCISION AND DRAINAGE    Medications prior to admission:   Prior to Admission medications    Medication Sig Start Date End Date Taking? Authorizing Provider   doxycycline hyclate (VIBRA-TABS) 100 MG tablet Take 1 tablet by mouth 2 times daily for 10 days 7/3/21 7/13/21  Shantelle Potts MD   fluocinonide (LIDEX) 0.05 % external solution apply to rash ON scalp twice a day if needed for psoriasis FLARES 21   Historical Provider, MD   sertraline (ZOLOFT) 50 MG tablet Take 1 tablet by mouth daily 3/29/21 9/25/21  Lui Valladares MD   lisinopril-hydroCHLOROthiazide LEYVA [facility]) 10-12.5 MG per tablet Take 1 tablet by mouth daily 3/29/21 7/4/21  Lui Valladares MD       Current medications:    No current facility-administered medications for this visit. No current outpatient medications on file. Facility-Administered Medications Ordered in Other Visits   Medication Dose Route Frequency Provider Last Rate Last Admin    cefepime (MAXIPIME) 2000 mg IVPB minibag  2,000 mg Intravenous Q8H YOSELIN Cobian - CNP   Stopped at 21 0718    metronidazole (FLAGYL) 500 mg in NaCl 100 mL IVPB premix  500 mg Intravenous Q8H YOSELIN Cobian - CNP   Stopped at 21 0301    lisinopril-hydroCHLOROthiazide (PRINZIDE;ZESTORETIC) 10-12.5 MG per tablet 1 tablet  1 tablet Oral Daily Bindhu Sadannay, APRN   1 tablet at 21 0855    sertraline (ZOLOFT) tablet 50 mg  50 mg Oral Daily Bindhu Sajay, APRN   50 mg at 21 0855    sodium chloride flush 0.9 % injection 5-40 mL  5-40 mL Intravenous 2 times per day Bindgiovanni Osborne, APRN   10 mL at 21 2147    sodium chloride flush 0.9 % injection 5-40 mL  5-40 mL Intravenous PRN Bindhu Sajay, APRN   10 mL at 07/04/21 2001    0.9 % sodium chloride infusion  25 mL Intravenous PRN Bindhu Sajay, APRN 100 mL/hr at 07/05/21 2146 25 mL at 07/05/21 2146    enoxaparin (LOVENOX) injection 40 mg  40 mg Subcutaneous Daily Bindhu Sajay, APRN   40 mg at 07/05/21 0856    polyethylene glycol (GLYCOLAX) packet 17 g  17 g Oral Daily PRN Bindhu Sajay, APRN        acetaminophen (TYLENOL) tablet 650 mg  650 mg Oral Q6H PRN Bindhu Sajay, APRN   650 mg at 07/05/21 2155    Or    acetaminophen (TYLENOL) suppository 650 mg  650 mg Rectal Q6H PRN Bindhu Sajay, APRN        ondansetron (ZOFRAN-ODT) disintegrating tablet 4 mg  4 mg Oral Q8H PRN Bindhu Sajay, APRN        Or    ondansetron (ZOFRAN) injection 4 mg  4 mg Intravenous Q6H PRN Bindhu Sajay, APRN        HYDROcodone-acetaminophen (NORCO) 5-325 MG per tablet 1 tablet  1 tablet Oral Q6H PRN Bindhu Sajay, APRN   1 tablet at 07/05/21 0543    vancomycin (VANCOCIN) 1,750 mg in dextrose 5 % 500 mL IVPB  1,750 mg Intravenous Q18H Bindhu Sajay, APRN   Stopped at 07/06/21 7137       Allergies:  No Known Allergies    Problem List:    Patient Active Problem List   Diagnosis Code    Essential hypertension I10    Situational anxiety F41.8    History of attention deficit disorder Z86.59    Rupture of right patellar tendon S86.811A    Cellulitis of right knee L03.115    Postoperative infection T81.40XA       Past Medical History:  No past medical history on file.     Past Surgical History:        Procedure Laterality Date    INGUINAL HERNIA REPAIR Left 1986    PATELLAR TENDON REPAIR Right 6/22/2021    RIGHT PATELLA TENDON REPAIR performed by Monica Kramer DO at [address] [place] [address] History:    Social History     Tobacco Use    Smoking status: Never Smoker    Smokeless tobacco: Former User     Types: Chew   Substance Use Topics    Alcohol use: Yes     Comment: Occasionally                                Counseling given: Not Answered      Vital Signs (Current): There were no vitals filed for this visit. BP Readings from Last 3 Encounters:   07/06/21 122/78   07/03/21 122/75   06/22/21 (!) 147/86       NPO Status:                                                                                 BMI:   Wt Readings from Last 3 Encounters:   07/04/21 270 lb (122.5 kg)   07/03/21 270 lb (122.5 kg)   06/22/21 270 lb (122.5 kg)     There is no height or weight on file to calculate BMI.    CBC:   Lab Results   Component Value Date    WBC 11.3 07/05/2021    RBC 4.26 07/05/2021    HGB 13.0 07/05/2021    HCT 39.2 07/05/2021    MCV 92.0 07/05/2021    RDW 12.4 07/05/2021     07/05/2021       CMP:   Lab Results   Component Value Date     07/05/2021    K 4.5 07/05/2021    CL 97 07/05/2021    CO2 28 07/05/2021    BUN 13 07/05/2021    CREATININE 0.8 07/05/2021    GFRAA >60 07/05/2021    AGRATIO 1.5 09/28/2020    LABGLOM >60 07/05/2021    GLUCOSE 110 07/05/2021    PROT 7.0 07/04/2021    CALCIUM 9.4 07/05/2021    BILITOT 0.3 07/04/2021    ALKPHOS 52 07/04/2021    AST 12 07/04/2021    ALT 19 07/04/2021       POC Tests: No results for input(s): POCGLU, POCNA, POCK, POCCL, POCBUN, POCHEMO, POCHCT in the last 72 hours.     Coags: No results found for: PROTIME, INR, APTT    HCG (If Applicable): No results found for: PREGTESTUR, PREGSERUM, HCG, HCGQUANT     ABGs: No results found for: PHART, PO2ART, PIL8NNK, MVN2GYX, BEART, J2SLKRLG     Type & Screen (If Applicable):  No results found for: LABABO, LABRH    Drug/Infectious Status (If Applicable):  No results found for: HIV, HEPCAB    COVID-19 Screening (If Applicable):   Lab Results   Component Value Date    COVID19 NOT DETECTED 06/22/2021           Anesthesia Evaluation  Patient summary reviewed no history of anesthetic complications:   Airway: Mallampati: II  TM distance: >3 FB   Neck ROM: full  Comment: Large head and neck  Mouth opening: > = 3 FB Dental: normal exam         Pulmonary:Negative Pulmonary ROS breath sounds clear to auscultation                             Cardiovascular:  Exercise tolerance: good (>4 METS),   (+) hypertension: mild,         Rhythm: regular  Rate: normal                    Neuro/Psych:   (+) psychiatric history (ADHD):             ROS comment: adhd GI/Hepatic/Renal:   (+) morbid obesity (BMI 40)          Endo/Other: Negative Endo/Other ROS             Pt had no PAT visit       Abdominal:             Vascular: negative vascular ROS. Other Findings:               Anesthesia Plan      general     ASA 3       Induction: intravenous. MIPS: Postoperative opioids intended and Prophylactic antiemetics administered. Plan discussed with CRNA.     Attending anesthesiologist reviewed and agrees with Preprocedure content            YOSELIN Calvillo - OC   7/6/2021

## 2021-07-06 NOTE — ANESTHESIA POSTPROCEDURE EVALUATION
Department of Anesthesiology  Postprocedure Note    Patient: Mario Eisenberg  MRN: [id number]  YOB: 1985  Date of evaluation: 7/6/2021  Time:  2:29 PM     Procedure Summary     Date: 07/06/21 Room / Location: [address] [place] [address]    Anesthesia Start: 1258 Anesthesia Stop: 6895    Procedure: RIGHT KNEE DEBRIDEMENT INCISION AND DRAINAGE (Right ) Diagnosis: (painful right knee)    Surgeons: Ismael Regan DO Responsible Provider: Junior Bora MD    Anesthesia Type: general ASA Status: 3          Anesthesia Type: general    Freeman Phase I:      Freeman Phase II:      Last vitals: Reviewed and per EMR flowsheets.        Anesthesia Post Evaluation    Patient location during evaluation: PACU  Patient participation: complete - patient participated  Level of consciousness: awake and alert  Pain score: 2  Airway patency: patent  Nausea & Vomiting: no vomiting and no nausea  Complications: no  Cardiovascular status: blood pressure returned to baseline and hemodynamically stable  Respiratory status: acceptable, spontaneous ventilation, nonlabored ventilation and nasal cannula  Hydration status: stable

## 2021-07-06 NOTE — PROGRESS NOTES
1425- Pt arrived from OR, monitors applied. Report received from [facility] and [address]. Respirations even and unlabored. Pt alert and re-orients to time easily. VSS. 1512- recovery complete, report called to [facility] RN, preparing pt for transfer. 1525- pt to room [address] aware of arrival. Pts mother at bedside.

## 2021-07-06 NOTE — BRIEF OP NOTE
Brief Postoperative Note      Patient: Faustina Guy  YOB: 1985  MRN: [id number]    Date of Procedure: 7/6/2021    Pre-Op Diagnosis: painful right knee    Post-Op Diagnosis: Same       Procedure(s):  RIGHT KNEE DEBRIDEMENT INCISION AND DRAINAGE    Surgeon(s):  Shaw Pryor DO    Assistant:  * No surgical staff found *    Anesthesia: General    Estimated Blood Loss (mL): less than 50     Complications: None    Specimens:   ID Type Source Tests Collected by Time Destination   1 : RIGHT KNEE  Body Fluid Fluid CULTURE, BODY FLUID Shaw Pryor DO 7/6/2021 1330        Implants:  * No implants in log *      Drains:   Open Drain Right; Anterior Knee (Active)       Findings: R knee postop infection    Electronically signed by Loren Yoder DO on 7/6/2021 at 2:16 PM

## 2021-07-06 NOTE — PROGRESS NOTES
Physician Progress Note      PATIENT:               Maria Heading  CSN #:                  [id number]  :                       1985  ADMIT DATE:       2021 1:22 PM  [address] [place] DATE:  RESPONDING  PROVIDER #:        Rianna Acosta TEXT:    Dear hospitalist,    Pt admitted with right knee cellulitis. Pt noted to have recent  right   patellar tendon repain on 2021. If possible, please document in progress   notes and discharge summary:    The medical record reflects the following:  Risk Factors: Status post right patella tendon rupture and repair 2021  Clinical Indicators: Per H&P: Cellulitis of knee, right : Inciting event:   injured while playing softball on 2021 and had right patellar rupture and   had surgery 2021 Failed Outpatient antibiotic- doxycycline. Positive   warmth, redness, pain and swelling right knee Mild leukocytosis, afebrile, not   tachycardic, lactate negative,   Per ID consult note: Right knee surgical   wound infection and abscess: s/p repair of right patellar tendon using   dermaspan 5x5 cm graft on 2021. Per progress note: Cellulitis/abscess of   right knee Status post right patella tendon rupture  Treatment: admission, labs, monitor, pending: orthopedic surgery consult,   Maxipime, Flagyl, Vancomycin, ID consult, pending blood cultures,    Thank you  Henry Lopez. Saira Kebede. ASHLEY Merrill, RN  Phone: [phone]  Options provided:  -- Cellulitis/ abscess of right knee as a postoperative complication  -- Cellulitis/ abscess of right knee as an expected/inherent condition that   occurred postoperatively and not a complication  -- Cellulitis/ abscess of right knee related to other incidental risk factor   (please specify) occurring following surgery and not a complication, Please   document incidental risk factor.   -- Other - I will add my own diagnosis  -- Disagree - Not applicable / Not valid  -- Disagree - Clinically unable to determine / Unknown  -- Refer to Clinical Documentation Reviewer    PROVIDER RESPONSE TEXT:    Cellulitis/ abscess of right knee is related to other incidental risk factor   occurring following surgery and not a complication.  not follow postoperative   instuctions of wound care    Query created by: Melody Bella on 7/6/2021 10:48 AM      Electronically signed by:  Camila Omalley 7/6/2021 11:11 AM

## 2021-07-06 NOTE — OP NOTE
DATE OF PROCEDURE:   7/6/2021     PREOPERATIVE DIAGNOSIS:  Right  knee postoperative wound infection . POSTOPERATIVE DIAGNOSIS:  Right  knee postoperative wound infection. OPERATION PERFORMED:   Incision and drainage right knee with excision of Dermaspan graft. SURGEON:  Jose Springer DO    ANESTHESIA:  General.     ESTIMATED BLOOD LOSS:  50 mL. FLUIDS:  500 mL of crystalloids. SPECIMEN: Right knee prepatellar cultures     INDICATION FOR PROCEDURE:  The patient is a 54-year-old male who underwent a repair for his right patellar tendon approximately 2 weeks ago. Over this past weekend he began developing increased redness, swelling, pain and fevers so he was admitted to [facility]. CT scan was obtained which showed a fluid collection in the prepatellar area. Given his clinical findings and with the CT findings I recommend additional surgical treatment. I explained  the risks, benefits, possible complications of the procedure to the  patient and after answering all of his questions, he consented to  undergo the above procedure. OPERATIVE PROCEDURE:  The patient was seen and evaluated in the  preoperative holding area where the right lower extremity was signed in  his presence. At this point, care of the patient was turned over to the  anesthesia team who transported him back to the operative suite. General anesthesia was applied and once adequate anesthesia was  obtained, the staples were then removed from the right knee and the right lower extremity was prepped and draped in the usual  sterile fashion. Preoperative antibiotics were administered. At this  point, a time-out was performed and all in attendance were in agreement. I then made a longitudinal  incision overlying the anterior midline of the right knee overlying the previous surgical scar. Immediately upon entering the subcutaneous tissue I did express a significant amount of purulent fluid. Cultures were then taken from this location and sent to pathology. I then continued opening the incision in its entirety and again encountered ashly purulent material from around the front of the right knee as well as in the suprapatellar pouch. I then continued digital dissection throughout all early adhesions breaking up all loculations in the prepatellar area. Once all of the purulent fluid was expressed I then used a scalpel and a knife to remove the tissue graft overlying the patellar tendon in its entirety. I then palpated the capsular repair as well as the infrapatellar tendon repair and did not find it to be any openings into the joint capsule. I then flexed and extended the knee and found that it to be no expression of any fluid from the knee joint so was not necessary to perform an arthrotomy at this point. I then used a rongeur to debride any remaining necrotic tissue from within the prepatellar area. I then irrigated the wound with pulse lavage for a total of 9 L of sterile normal saline followed by 1 L of sterile normal saline with gentamicin. I then placed a Penrose drain in the suprapatellar pouch extending out of the distal aspect of the incision. I then closed subcutaneous tissue using 2-0 Vicryl suture followed by skin closure with 0 Prolene in horizontal mattress fashion. Adequate hemostasis was maintained at all times. I then applied a sterile soft dressing to the right lower extremity followed by knee brace  locked in 10 degrees of extension. The patient was then awakened from  anesthesia and transported to PACU in stable condition. He appeared to  have tolerated the procedure well. PROGNOSIS:  At this point, he will be readmitted to the hospital for postoperative pain control, rehabilitation, IV antibiotics and medical management. He is to  maintain his brace at all times and is to remain non-weightbearing on the right leg.     I will continue to monitor him during his hospitalization to ensure that no signs of septic arthritis develops and if this occurs he may require an arthrotomy with I&D. Following his discharge, I will see him back in the office in 2 weeks and continue to monitor his progress in the outpatient setting for resolution of his symptoms.            Celia Ballesteros, DO

## 2021-07-06 NOTE — PROGRESS NOTES
[address]  consulted by YOSELIN Jordan  for monitoring and adjustment. Indication for treatment: Cellulitis of the knee  Goal trough: 10-15 mcg/mL  AUC goal:  <500    Pertinent Laboratory Values:   Temp Readings from Last 3 Encounters:   07/06/21 99.2 °F (37.3 °C) (Oral)   07/03/21 98.5 °F (36.9 °C)   06/22/21 96.9 °F (36.1 °C)     Recent Labs     07/04/21  1400 07/05/21  0802   WBC 10.7* 11.3*   LACTATE 0.7  --      Recent Labs     07/04/21  1400 07/05/21  0802 07/06/21  1044   BUN 18 13  --    CREATININE 0.7* 0.8* 0.8*     Estimated Creatinine Clearance: 167 mL/min (A) (based on SCr of 0.8 mg/dL (L)). Intake/Output Summary (Last 24 hours) at 7/6/2021 1336  Last data filed at 7/6/2021 0648  Gross per 24 hour   Intake 1653.33 ml   Output 1925 ml   Net -271.67 ml     Pertinent Cultures:  Date    Source    Results  7/4   Blood    NGTD           Vancomycin level:   TROUGH:  No results for input(s): VANCOTROUGH in the last 72 hours. RANDOM:  No results for input(s): VANCORANDOM in the last 72 hours. Assessment:  · WBC and temperature: WBC trending up/afebrile  · SCr, BUN, and urine output: stable  · Day(s) of therapy:  3  · Vancomycin concentration: scheduled for 7/6 @20:30    Plan:  · Continue vancomycin 1750mg q18h   · Bayesian dosing predicts an AUC of 433 and a trough of 12.3  · Pharmacy will continue to monitor patient and adjust therapy as indicated    VANCOMYCIN CONCENTRATION SCHEDULED FOR 7/6 @20:30    Thank you for the consult.   Grecia Justin, [address] [place] [address]  7/6/2021 1:36 PM

## 2021-07-06 NOTE — CONSULTS
Consult to Orthopedic Surgery  Consult performed by: Ilir Maya DO  Consult ordered by: Mars Mejias PA-C  Reason for consult: Right knee cellulitis  Assessment/Recommendations:     Right knee cellulitis and prepatellar abscess    I discussed with him today his x-ray and CT findings. I explained to him that he does have a fluid collection in the front of his right knee and clinically he is showing signs of an abscess in his postoperative incision site but does not appear to be showing signs of any septic arthritis in the actual knee joint. At this point I recommend proceeding with urgent surgical treatment. I discussed with him today performing incision and drainage of his right knee abscess. I explained risks, benefits, possible complications of the procedure and answered all questions for the patient. I explained postoperative rehabilitation protocol and expectations with the patient today. The patient understands and consents to the procedure. Will be kept n.p.o. for planned surgical treatments afternoon. Remain nonweightbearing on right lower extremity. Pain medication as needed. Ice and elevate as needed. Bhavin Simons is a 72-year-old male who about 2 weeks ago underwent a repair for his right patellar tendon rupture. He began developing increased pain, swelling and redness over the weekend and he was subsequently admitted to the hospitalist for medical management and I was then consulted for evaluation treatment of his right knee pain. He states that late last week he began having some increased pain, chills and nauseated feeling which gradually improved but then on Friday his dog jumped across him scratching his incision site and then he developed worsening redness over the weekend.   He went to the ED in [place] who placed him on oral antibiotics and despite this his pain and redness worsen so he presented to [facility] ED. he states that since he has been admitted to the hospital on IV antibiotics his pain has been improving and the redness is slowly decreasing in the front of his right knee. He denies any numbness or tingling in the right leg and denies any recent falls or injuries. Review of Systems   Constitutional: Positive for appetite change, chills, fatigue and fever. Negative for activity change. Respiratory: Negative for chest tightness. Cardiovascular: Negative for chest pain. Musculoskeletal: Positive for arthralgias, gait problem, joint swelling and myalgias. Negative for back pain. Skin: Positive for color change. Negative for pallor, rash and wound. Neurological: Positive for weakness. Negative for numbness. History reviewed. No pertinent past medical history. No current facility-administered medications on file prior to encounter.      Current Outpatient Medications on File Prior to Encounter   Medication Sig Dispense Refill    doxycycline hyclate (VIBRA-TABS) 100 MG tablet Take 1 tablet by mouth 2 times daily for 10 days 20 tablet 0    fluocinonide (LIDEX) 0.05 % external solution apply to rash ON scalp twice a day if needed for psoriasis FLARES      sertraline (ZOLOFT) 50 MG tablet Take 1 tablet by mouth daily 90 tablet 1    lisinopril-hydroCHLOROthiazide (PRINZIDE;ZESTORETIC) 10-12.5 MG per tablet Take 1 tablet by mouth daily 30 tablet 5     No Known Allergies  Past Surgical History:   Procedure Laterality Date    INGUINAL HERNIA REPAIR Left 1986    PATELLAR TENDON REPAIR Right 6/22/2021    RIGHT PATELLA TENDON REPAIR performed by Cr Hale DO at [facility] OR     Social History     Tobacco Use    Smoking status: Never Smoker    Smokeless tobacco: Former User     Types: Chew   Vaping Use    Vaping Use: Never used   Substance Use Topics    Alcohol use: Yes     Comment: Occasionally    Drug use: Never     Family History   Problem Relation Age of Onset    Other Mother         Adriano's    Cancer Father         Testicular     Right Knee Exam Tenderness   The patient is experiencing tenderness in the patella and patellar tendon. Other   Erythema: present  Scars: present  Sensation: normal  Pulse: present  Swelling: moderate  Effusion: no effusion present    Comments:  Right knee-incision clean, mild drainage, staples present. There is diffuse erythema throughout the entire anterior knee extending proximally to the distal thigh proximal to the patella and extending inferiorly towards the tibial tubercle, exquisite tenderness to palpation around the cellulitic area. There does not appear to be any appreciable joint effusion. He has only mild pain with flexion extension of the right knee and I am able to passively flex him to approximately 45 and extend him fully. Full range of motion assessed due to recent surgery. Left Knee Exam     Tenderness   The patient is experiencing no tenderness. Range of Motion   The patient has normal left knee ROM. Other   Erythema: absent  Sensation: normal  Pulse: present  Swelling: none  Effusion: no effusion present            /69   Pulse 94   Temp 98.8 °F (37.1 °C) (Oral)   Resp 16   Ht 5' 9\" (1.753 m)   Wt 270 lb (122.5 kg)   SpO2 95%   BMI 39.87 kg/m²     Lab Results   Component Value Date    WBC 11.3 (H) 07/05/2021    HGB 13.0 (L) 07/05/2021    HCT 39.2 (L) 07/05/2021    MCV 92.0 07/05/2021     07/05/2021       XR KNEE RIGHT (3 VIEWS)    Result Date: 7/3/2021  EXAMINATION: THREE XRAY VIEWS OF THE RIGHT KNEE 7/3/2021 12:45 pm COMPARISON: 06/16/2021 HISTORY: ORDERING SYSTEM PROVIDED HISTORY: post op pain TECHNOLOGIST PROVIDED HISTORY: Reason for exam:->post op pain Reason for Exam: post op pain Acuity: Acute Type of Exam: Initial Mechanism of Injury: reports his dog stepped on his knee yesterday Relevant Medical/Surgical History: Rt knee surgery on 6/22, rt knee swelling FINDINGS: Surgical skin staples are present anteriorly with soft tissue swelling.  Improved alignment of the patella. No joint effusion. Joint spaces are maintained. No acute fracture. Postoperative changes with improved patellar alignment. Associated prepatellar soft tissue swelling. Otherwise no acute abnormality. CT KNEE RIGHT W CONTRAST    Result Date: 7/4/2021  EXAMINATION: CT OF THE RIGHT KNEE WITH CONTRAST 7/4/2021 7:45 pm TECHNIQUE: CT of the right knee was performed with the administration of intravenous contrast.  Multiplanar reformatted images are provided for review. Dose modulation, iterative reconstruction, and/or weight based adjustment of the mA/kV was utilized to reduce the radiation dose to as low as reasonably achievable. COMPARISON: Right knee radiograph July 3, 2021; right knee radiograph June 16, 2020 HISTORY ORDERING SYSTEM PROVIDED HISTORY: Infection, rule out abscess TECHNOLOGIST PROVIDED HISTORY: Reason for exam:->Infection, rule out abscess Reason for Exam: Infection, rule out abscess Acuity: Acute Type of Exam: Initial Additional signs and symptoms: no Relevant Medical/Surgical History: 80 ml isovue 370 used Surgery June 22, 2021 FINDINGS: Bones: No acute fracture identified. Postoperative changes of the extensor mechanism and patella noted with several linear surgical tracts extending through the patella. Mild periosteal reaction of the patella. This is nonspecific, however, osteomyelitis cannot be excluded given surrounding soft tissue changes. Soft Tissue: Prominent subcutaneous edema throughout the soft tissues of the right knee which is more pronounced anteriorly. There is a rim enhancing fluid collection containing gas centered within the prepatellar soft tissues which measures approximately 1.3 x 8.4 x 10.9 cm. The collection appears to extend to the level of the midline surgical staple line. Joint: Anatomic alignment of the knee. Small knee effusion present. Communication of the knee effusion with the anterior soft tissue collection cannot be excluded.      1. Rim enhancing fluid collection centered about the prepatellar soft tissues measuring approximately 1.8 x 8.4 x 10.9 cm and containing gas. Findings highly suggestive of abscess. A small joint effusion is also present. Communication of the joint fluid and anterior rim enhancing collection cannot be excluded. 2. Mild periosteal reaction along the patella. This is nonspecific in the recent postoperative setting with osteomyelitis not excluded given surrounding soft tissue changes.

## 2021-07-06 NOTE — PROGRESS NOTES
Progress Note  Date:2021       Room:1101/1101-A  Patient Heidi Egan     YOB: 1985     Age:35 y.o. Seen and examined in the room. Reported mild pain and swelling on the right knee, improved with treatment. Subjective    Subjective:  Symptoms:  Improved. No shortness of breath, malaise, cough, chest pain, weakness, headache or chest pressure. Diet:  Adequate intake. No nausea or vomiting. Activity level: Normal.    Pain:  He complains of pain that is mild. He reports pain is improving. Pain is well controlled. Review of Systems   Constitutional: Negative for fever. Respiratory: Negative for cough and shortness of breath. Cardiovascular: Negative for chest pain. Gastrointestinal: Negative for nausea and vomiting. Neurological: Negative for weakness. Objective         Vitals Last 24 Hours:  TEMPERATURE:  Temp  Av.6 °F (37.6 °C)  Min: 99 °F (37.2 °C)  Max: 100.5 °F (38.1 °C)  RESPIRATIONS RANGE: Resp  Av.8  Min: 16  Max: 19  PULSE OXIMETRY RANGE: SpO2  Av %  Min: 93 %  Max: 96 %  PULSE RANGE: Pulse  Av.5  Min: 90  Max: 101  BLOOD PRESSURE RANGE: Systolic (45GUU), UQN:274 , Min:122 , MARCO A:049   ; Diastolic (13MOX), OCK:39, Min:73, Max:78    I/O (24Hr): Intake/Output Summary (Last 24 hours) at 2021 1114  Last data filed at 2021 0648  Gross per 24 hour   Intake 1653.33 ml   Output 1925 ml   Net -271.67 ml     Objective:  General Appearance: In no acute distress. Vital signs: (most recent): Blood pressure 122/78, pulse 100, temperature 99.2 °F (37.3 °C), temperature source Oral, resp. rate 18, height 5' 9\" (1.753 m), weight 270 lb (122.5 kg), SpO2 93 %. Vital signs are normal.  No fever. Output: Producing urine and producing stool. Lungs:  Normal effort and normal respiratory rate. Heart: Normal rate. Regular rhythm. Abdomen: Abdomen is soft. Bowel sounds are normal.     Extremities:  There is effusion and local swelling. Neurological: Patient is alert. Skin:  There is a rash. Labs/Imaging/Diagnostics    Labs:  CBC:  Recent Labs     07/04/21  1400 07/05/21  0802   WBC 10.7* 11.3*   RBC 4.11* 4.26*   HGB 12.4* 13.0*   HCT 38.0* 39.2*   MCV 92.5 92.0   RDW 12.2 12.4    380     CHEMISTRIES:  Recent Labs     07/04/21  1400 07/05/21  0802   * 138   K 4.3 4.5   CL 97* 97*   CO2 26 28   BUN 18 13   CREATININE 0.7* 0.8*   GLUCOSE 99 110*     PT/INR:No results for input(s): PROTIME, INR in the last 72 hours. APTT:No results for input(s): APTT in the last 72 hours. LIVER PROFILE:  Recent Labs     07/04/21  1400   AST 12*   ALT 19   BILITOT 0.3   ALKPHOS 52       Imaging Last 24 Hours:  XR KNEE RIGHT (3 VIEWS)    Result Date: 7/3/2021  EXAMINATION: THREE XRAY VIEWS OF THE RIGHT KNEE 7/3/2021 12:45 pm COMPARISON: 06/16/2021 HISTORY: ORDERING SYSTEM PROVIDED HISTORY: post op pain TECHNOLOGIST PROVIDED HISTORY: Reason for exam:->post op pain Reason for Exam: post op pain Acuity: Acute Type of Exam: Initial Mechanism of Injury: reports his dog stepped on his knee yesterday Relevant Medical/Surgical History: Rt knee surgery on 6/22, rt knee swelling FINDINGS: Surgical skin staples are present anteriorly with soft tissue swelling. Improved alignment of the patella. No joint effusion. Joint spaces are maintained. No acute fracture. Postoperative changes with improved patellar alignment. Associated prepatellar soft tissue swelling. Otherwise no acute abnormality. CT KNEE RIGHT W CONTRAST    Result Date: 7/4/2021  EXAMINATION: CT OF THE RIGHT KNEE WITH CONTRAST 7/4/2021 7:45 pm TECHNIQUE: CT of the right knee was performed with the administration of intravenous contrast.  Multiplanar reformatted images are provided for review. Dose modulation, iterative reconstruction, and/or weight based adjustment of the mA/kV was utilized to reduce the radiation dose to as low as reasonably achievable.  COMPARISON: Right

## 2021-07-06 NOTE — PROGRESS NOTES
Infectious Disease Progress Note  2021   Patient Name: Maximus Fowler : 1985       Reason for visit: F/u right knee wound and abscess. Off the floor for surgery  History:? Interval history noted  Denies n/v/d/f or untoward effects of antimicrobials  Physical Exam:  Vital Signs: /73   Pulse 90   Temp 99 °F (37.2 °C)   Resp 16   Ht 5' 9\" (1.753 m)   Wt 270 lb (122.5 kg)   SpO2 93%   BMI 39.87 kg/m²     Gen: alert and oriented X3, no distress  Skin: no stigmata of endocarditis  Wounds: C/D/I  HEMT: AT/NC Oropharynx pink, moist, and without lesions or exudates; dentition in good state of repair  Eyes: PERRLA, EOMI, conjunctiva pink, sclera anicteric. Neck: Supple. Trachea midline. No LAD. Chest: no distress and CTA. Good air movement. Heart: RRR and no MRG. Abd: soft, non-distended, no tenderness, no hepatomegaly. Normoactive bowel sounds. Ext: no clubbing, cyanosis, or edema  Catheter Site: without erythema or tenderness  LDA:   Neuro: Mental status intact. CN 2-12 intact and no focal sensory or motor deficits     Radiologic / Imaging / TESTING  No results found.      Labs:    Recent Results (from the past 24 hour(s))   Basic Metabolic Panel w/ Reflex to MG    Collection Time: 21  8:02 AM   Result Value Ref Range    Sodium 138 135 - 145 MMOL/L    Potassium 4.5 3.5 - 5.1 MMOL/L    Chloride 97 (L) 99 - 110 mMol/L    CO2 28 21 - 32 MMOL/L    Anion Gap 13 4 - 16    BUN 13 6 - 23 MG/DL    CREATININE 0.8 (L) 0.9 - 1.3 MG/DL    Glucose 110 (H) 70 - 99 MG/DL    Calcium 9.4 8.3 - 10.6 MG/DL    GFR Non-African American >60 >60 mL/min/1.73m2    GFR African American >60 >60 mL/min/1.73m2   CBC    Collection Time: 21  8:02 AM   Result Value Ref Range    WBC 11.3 (H) 4.0 - 10.5 K/CU MM    RBC 4.26 (L) 4.6 - 6.2 M/CU MM    Hemoglobin 13.0 (L) 13.5 - 18.0 GM/DL    Hematocrit 39.2 (L) 42 - 52 %    MCV 92.0 78 - 100 FL    MCH 30.5 27 - 31 PG    MCHC 33.2 32.0 - 36.0 %    RDW 12.4 11.7 - 14.9 % Platelets 473 976 - 175 K/CU MM    MPV 8.8 7.5 - 11.1 FL   Procalcitonin    Collection Time: 07/05/21  8:02 AM   Result Value Ref Range    Procalcitonin 0.082    C-reactive protein    Collection Time: 07/05/21  8:02 AM   Result Value Ref Range    CRP, High Sensitivity 166.9 mg/L     CULTURE results: Invalid input(s): BLOOD CULTURE,  URINE CULTURE, SURGICAL CULTURE    Diagnosis:  Patient Active Problem List   Diagnosis    Essential hypertension    Situational anxiety    History of attention deficit disorder    Rupture of right patellar tendon    Cellulitis of right knee    Postoperative infection       Active Problems  Active Problems:    Cellulitis of right knee    Postoperative infection  Resolved Problems:    * No resolved hospital problems. *      Impression and plan   Summary and rationale: Patient is a 28 y.o.  male with obesity, who suffered a right knee patella tendon rupture on 6/16/2021 during a baseball game while he was running to third base. He underwent right patella tendon repair on 6/22/2021 and started to experience pain and swelling on 6/29/2021. CT right knee revealed a fluid collection.  Diagnosed with infection, abscess and surrounding inflammation   Clinical status: Off the floor for surgery   Therapeutic: vancomycin, cefepime and metronidazole   Diagnostic:   F/u:   Other:        Electronically signed by: Electronically signed by Harjit Yarbrough MD on 7/6/2021 at 5:11 AM

## 2021-07-07 LAB
CREAT SERPL-MCNC: 0.7 MG/DL (ref 0.9–1.3)
CULTURE: NORMAL
ERYTHROCYTE SEDIMENTATION RATE: 98 MM/HR (ref 0–15)
GFR AFRICAN AMERICAN: >60 ML/MIN/1.73M2
GFR NON-AFRICAN AMERICAN: >60 ML/MIN/1.73M2
HIGH SENSITIVE C-REACTIVE PROTEIN: 99.1 MG/L
Lab: NORMAL
PROCALCITONIN: 0.08
SPECIMEN: NORMAL

## 2021-07-07 PROCEDURE — 82565 ASSAY OF CREATININE: CPT

## 2021-07-07 PROCEDURE — 2580000003 HC RX 258: Performed by: ORTHOPAEDIC SURGERY

## 2021-07-07 PROCEDURE — 6370000000 HC RX 637 (ALT 250 FOR IP): Performed by: ORTHOPAEDIC SURGERY

## 2021-07-07 PROCEDURE — 85652 RBC SED RATE AUTOMATED: CPT

## 2021-07-07 PROCEDURE — 84145 PROCALCITONIN (PCT): CPT

## 2021-07-07 PROCEDURE — 2500000003 HC RX 250 WO HCPCS: Performed by: ORTHOPAEDIC SURGERY

## 2021-07-07 PROCEDURE — 2580000003 HC RX 258: Performed by: INTERNAL MEDICINE

## 2021-07-07 PROCEDURE — 1200000000 HC SEMI PRIVATE

## 2021-07-07 PROCEDURE — 99232 SBSQ HOSP IP/OBS MODERATE 35: CPT | Performed by: INTERNAL MEDICINE

## 2021-07-07 PROCEDURE — 6360000002 HC RX W HCPCS: Performed by: ORTHOPAEDIC SURGERY

## 2021-07-07 PROCEDURE — 6360000002 HC RX W HCPCS: Performed by: INTERNAL MEDICINE

## 2021-07-07 PROCEDURE — 94761 N-INVAS EAR/PLS OXIMETRY MLT: CPT

## 2021-07-07 PROCEDURE — 36415 COLL VENOUS BLD VENIPUNCTURE: CPT

## 2021-07-07 PROCEDURE — 86141 C-REACTIVE PROTEIN HS: CPT

## 2021-07-07 RX ADMIN — SODIUM CHLORIDE, PRESERVATIVE FREE 10 ML: 5 INJECTION INTRAVENOUS at 08:44

## 2021-07-07 RX ADMIN — OXYCODONE HYDROCHLORIDE AND ACETAMINOPHEN 1 TABLET: 5; 325 TABLET ORAL at 12:22

## 2021-07-07 RX ADMIN — CEFEPIME HYDROCHLORIDE 2000 MG: 2 INJECTION, POWDER, FOR SOLUTION INTRAVENOUS at 06:37

## 2021-07-07 RX ADMIN — SODIUM CHLORIDE, POTASSIUM CHLORIDE, SODIUM LACTATE AND CALCIUM CHLORIDE: 600; 310; 30; 20 INJECTION, SOLUTION INTRAVENOUS at 18:11

## 2021-07-07 RX ADMIN — SODIUM CHLORIDE, POTASSIUM CHLORIDE, SODIUM LACTATE AND CALCIUM CHLORIDE: 600; 310; 30; 20 INJECTION, SOLUTION INTRAVENOUS at 06:36

## 2021-07-07 RX ADMIN — VANCOMYCIN HYDROCHLORIDE 1750 MG: 5 INJECTION, POWDER, LYOPHILIZED, FOR SOLUTION INTRAVENOUS at 10:33

## 2021-07-07 RX ADMIN — CEFAZOLIN SODIUM 2000 MG: 10 INJECTION, POWDER, FOR SOLUTION INTRAVENOUS at 21:37

## 2021-07-07 RX ADMIN — METRONIDAZOLE 500 MG: 500 INJECTION, SOLUTION INTRAVENOUS at 18:14

## 2021-07-07 RX ADMIN — LISINOPRIL AND HYDROCHLOROTHIAZIDE 1 TABLET: 12.5; 1 TABLET ORAL at 08:42

## 2021-07-07 RX ADMIN — SERTRALINE HYDROCHLORIDE 50 MG: 50 TABLET ORAL at 08:43

## 2021-07-07 RX ADMIN — CEFEPIME HYDROCHLORIDE 2000 MG: 2 INJECTION, POWDER, FOR SOLUTION INTRAVENOUS at 13:52

## 2021-07-07 RX ADMIN — METRONIDAZOLE 500 MG: 500 INJECTION, SOLUTION INTRAVENOUS at 10:23

## 2021-07-07 RX ADMIN — OXYCODONE HYDROCHLORIDE AND ACETAMINOPHEN 1 TABLET: 5; 325 TABLET ORAL at 06:36

## 2021-07-07 RX ADMIN — METRONIDAZOLE 500 MG: 500 INJECTION, SOLUTION INTRAVENOUS at 03:22

## 2021-07-07 RX ADMIN — ENOXAPARIN SODIUM 40 MG: 40 INJECTION SUBCUTANEOUS at 08:42

## 2021-07-07 RX ADMIN — OXYCODONE HYDROCHLORIDE AND ACETAMINOPHEN 1 TABLET: 5; 325 TABLET ORAL at 21:30

## 2021-07-07 RX ADMIN — CEFAZOLIN SODIUM 2000 MG: 10 INJECTION, POWDER, FOR SOLUTION INTRAVENOUS at 15:41

## 2021-07-07 ASSESSMENT — PAIN DESCRIPTION - LOCATION
LOCATION: KNEE
LOCATION: KNEE

## 2021-07-07 ASSESSMENT — PAIN DESCRIPTION - PAIN TYPE
TYPE: SURGICAL PAIN
TYPE: SURGICAL PAIN

## 2021-07-07 ASSESSMENT — PAIN DESCRIPTION - FREQUENCY
FREQUENCY: CONTINUOUS
FREQUENCY: CONTINUOUS

## 2021-07-07 ASSESSMENT — PAIN SCALES - GENERAL
PAINLEVEL_OUTOF10: 0
PAINLEVEL_OUTOF10: 4
PAINLEVEL_OUTOF10: 5
PAINLEVEL_OUTOF10: 2
PAINLEVEL_OUTOF10: 5
PAINLEVEL_OUTOF10: 8

## 2021-07-07 ASSESSMENT — PAIN DESCRIPTION - ONSET
ONSET: ON-GOING
ONSET: ON-GOING

## 2021-07-07 ASSESSMENT — PAIN DESCRIPTION - PROGRESSION: CLINICAL_PROGRESSION: GRADUALLY WORSENING

## 2021-07-07 ASSESSMENT — PAIN - FUNCTIONAL ASSESSMENT: PAIN_FUNCTIONAL_ASSESSMENT: ACTIVITIES ARE NOT PREVENTED

## 2021-07-07 ASSESSMENT — ENCOUNTER SYMPTOMS
COUGH: 0
VOMITING: 0
NAUSEA: 0
SHORTNESS OF BREATH: 0

## 2021-07-07 ASSESSMENT — PAIN DESCRIPTION - DESCRIPTORS
DESCRIPTORS: ACHING;SORE
DESCRIPTORS: ACHING
DESCRIPTORS: ACHING;SORE

## 2021-07-07 ASSESSMENT — PAIN DESCRIPTION - ORIENTATION
ORIENTATION: RIGHT
ORIENTATION: RIGHT

## 2021-07-07 ASSESSMENT — PAIN DESCRIPTION - DIRECTION: RADIATING_TOWARDS: DOWN

## 2021-07-07 NOTE — PROGRESS NOTES
Progress Note  Date:2021       Room:1101/1101-A  Patient Zuleima Maria     YOB: 1985     Age:35 y.o. Seen and examined in the room. Reported mild pain and swelling on the right knee, improved with treatment. Subjective    Subjective:  Symptoms:  Improved. No shortness of breath, malaise, cough, chest pain, weakness, headache or chest pressure. Diet:  Adequate intake. No nausea or vomiting. Activity level: Normal.    Pain:  He complains of pain that is mild. He reports pain is improving. Pain is well controlled. Review of Systems   Constitutional: Negative for fever. Respiratory: Negative for cough and shortness of breath. Cardiovascular: Negative for chest pain. Gastrointestinal: Negative for nausea and vomiting. Neurological: Negative for weakness. Objective         Vitals Last 24 Hours:  TEMPERATURE:  Temp  Av.5 °F (36.9 °C)  Min: 97 °F (36.1 °C)  Max: 100.2 °F (37.9 °C)  RESPIRATIONS RANGE: Resp  Av.2  Min: 1  Max: 30  PULSE OXIMETRY RANGE: SpO2  Av.8 %  Min: 90 %  Max: 100 %  PULSE RANGE: Pulse  Av.6  Min: 76  Max: 95  BLOOD PRESSURE RANGE: Systolic (71IHV), OBE:912 , Min:89 , ABB:769   ; Diastolic (70IVY), DRZ:79, Min:54, Max:85    I/O (24Hr): Intake/Output Summary (Last 24 hours) at 2021 1017  Last data filed at 2021 0851  Gross per 24 hour   Intake 2299.58 ml   Output 1950 ml   Net 349.58 ml     Objective:  General Appearance: In no acute distress. Vital signs: (most recent): Blood pressure (!) 123/56, pulse 92, temperature 98.5 °F (36.9 °C), temperature source Oral, resp. rate 20, height 5' 9\" (1.753 m), weight 270 lb (122.5 kg), SpO2 94 %. Vital signs are normal.  No fever. Output: Producing urine and producing stool. Lungs:  Normal effort and normal respiratory rate. Heart: Normal rate. Regular rhythm. Abdomen: Abdomen is soft. Bowel sounds are normal.     Extremities:  There is effusion and local swelling. Neurological: Patient is alert. Skin:  There is a rash. Labs/Imaging/Diagnostics    Labs:  CBC:  Recent Labs     07/04/21  1400 07/05/21  0802   WBC 10.7* 11.3*   RBC 4.11* 4.26*   HGB 12.4* 13.0*   HCT 38.0* 39.2*   MCV 92.5 92.0   RDW 12.2 12.4    380     CHEMISTRIES:  Recent Labs     07/04/21  1400 07/05/21  0802 07/06/21  1044   * 138  --    K 4.3 4.5  --    CL 97* 97*  --    CO2 26 28  --    BUN 18 13  --    CREATININE 0.7* 0.8* 0.8*   GLUCOSE 99 110*  --      PT/INR:No results for input(s): PROTIME, INR in the last 72 hours. APTT:No results for input(s): APTT in the last 72 hours. LIVER PROFILE:  Recent Labs     07/04/21  1400   AST 12*   ALT 19   BILITOT 0.3   ALKPHOS 52       Imaging Last 24 Hours:  XR KNEE RIGHT (3 VIEWS)    Result Date: 7/3/2021  EXAMINATION: THREE XRAY VIEWS OF THE RIGHT KNEE 7/3/2021 12:45 pm COMPARISON: 06/16/2021 HISTORY: ORDERING SYSTEM PROVIDED HISTORY: post op pain TECHNOLOGIST PROVIDED HISTORY: Reason for exam:->post op pain Reason for Exam: post op pain Acuity: Acute Type of Exam: Initial Mechanism of Injury: reports his dog stepped on his knee yesterday Relevant Medical/Surgical History: Rt knee surgery on 6/22, rt knee swelling FINDINGS: Surgical skin staples are present anteriorly with soft tissue swelling. Improved alignment of the patella. No joint effusion. Joint spaces are maintained. No acute fracture. Postoperative changes with improved patellar alignment. Associated prepatellar soft tissue swelling. Otherwise no acute abnormality. CT KNEE RIGHT W CONTRAST    Result Date: 7/4/2021  EXAMINATION: CT OF THE RIGHT KNEE WITH CONTRAST 7/4/2021 7:45 pm TECHNIQUE: CT of the right knee was performed with the administration of intravenous contrast.  Multiplanar reformatted images are provided for review.  Dose modulation, iterative reconstruction, and/or weight based adjustment of the mA/kV was utilized to reduce the radiation dose to as low as reasonably achievable. COMPARISON: Right knee radiograph July 3, 2021; right knee radiograph June 16, 2020 HISTORY ORDERING SYSTEM PROVIDED HISTORY: Infection, rule out abscess TECHNOLOGIST PROVIDED HISTORY: Reason for exam:->Infection, rule out abscess Reason for Exam: Infection, rule out abscess Acuity: Acute Type of Exam: Initial Additional signs and symptoms: no Relevant Medical/Surgical History: 80 ml isovue 370 used Surgery June 22, 2021 FINDINGS: Bones: No acute fracture identified. Postoperative changes of the extensor mechanism and patella noted with several linear surgical tracts extending through the patella. Mild periosteal reaction of the patella. This is nonspecific, however, osteomyelitis cannot be excluded given surrounding soft tissue changes. Soft Tissue: Prominent subcutaneous edema throughout the soft tissues of the right knee which is more pronounced anteriorly. There is a rim enhancing fluid collection containing gas centered within the prepatellar soft tissues which measures approximately 1.3 x 8.4 x 10.9 cm. The collection appears to extend to the level of the midline surgical staple line. Joint: Anatomic alignment of the knee. Small knee effusion present. Communication of the knee effusion with the anterior soft tissue collection cannot be excluded. 1. Rim enhancing fluid collection centered about the prepatellar soft tissues measuring approximately 1.8 x 8.4 x 10.9 cm and containing gas. Findings highly suggestive of abscess. A small joint effusion is also present. Communication of the joint fluid and anterior rim enhancing collection cannot be excluded. 2. Mild periosteal reaction along the patella. This is nonspecific in the recent postoperative setting with osteomyelitis not excluded given surrounding soft tissue changes.      Assessment//Plan           Hospital Problems         Last Modified POA    Cellulitis of right knee 7/5/2021 Yes    Postoperative infection 7/5/2021 Yes        Assessment & Plan    Layton Anne is a 28 y.o.  male  who presents with  worsening right knee pain and swellin and failed outpatient oral antibiotics     #  Cellulitis/abscess of right knee  Status post right patella tendon rupture and repair 06/22/2021. On oral doxycycline after discharge. CT of right knee showed possible abscess and cellulitis. ID consulted, currently on cefepime, Flagyl, and vancomycin  Status post right knee I&D 07/06/2021. 500 cc purulent fluid evacuated. Pending abscess fluid cx  Orthopedics considers repeat I&D if needed tomorrow morning. Patient needs to continue hospitalization, pending possible orthopedic surgery, pending abscess culture. #  Essential hypertension  BP controlled, continue home medication. #  Obesity  Weight control discussed with patient. #  Anxiety/depression  Continue home medication. DVT prophylaxis: Lovenox subcutaneous. GI prophylaxis: not indicated. CODE STATUS: full.     Electronically signed by YOSELIN Neil CNP on 7/5/21 at 11:03 AM EDT

## 2021-07-07 NOTE — PROGRESS NOTES
Bonifacio Mccord is a 28 y.o. male patient. 1. Cellulitis of right knee    2. Postoperative infection, unspecified type, initial encounter      History reviewed. No pertinent past medical history. No past surgical history pertinent negatives on file. Scheduled Meds:   sodium chloride flush  10 mL Intravenous 2 times per day    cefepime  2,000 mg Intravenous Q8H    metroNIDAZOLE  500 mg Intravenous Q8H    lisinopril-hydroCHLOROthiazide  1 tablet Oral Daily    sertraline  50 mg Oral Daily    sodium chloride flush  5-40 mL Intravenous 2 times per day    enoxaparin  40 mg Subcutaneous Daily    vancomycin  1,750 mg Intravenous Q18H     Continuous Infusions:   lactated ringers 125 mL/hr at 07/07/21 0636    sodium chloride       PRN Meds:sodium chloride flush, sodium chloride, oxyCODONE-acetaminophen, sodium chloride flush, polyethylene glycol, acetaminophen **OR** acetaminophen, ondansetron **OR** ondansetron    No Known Allergies  Active Problems:    Cellulitis of right knee    Postoperative infection  Resolved Problems:    * No resolved hospital problems. *    Blood pressure (!) 114/58, pulse 76, temperature 98.1 °F (36.7 °C), resp. rate 16, height 5' 9\" (1.753 m), weight 270 lb (122.5 kg), SpO2 93 %. Subjective   Patient seen and examined, resting in bed comfortably, pain controlled, no new complaints. Feeling much better today, he states that the pain in his right knee is completely gone and he no longer feels ill or feverish. Objective   RLE - Dressing clean, dry, intact, no calf TTP, compartments soft, NVID  Hinged knee brace locked in extension. No pain with passive range of motion of the right knee. Assessment & Plan  POD #1 right knee I&D, Doing well postoperatively    Continue nonweightbearing on right lower extremity. Continue hinged knee brace locked in extension at all times. Continue IV antibiotics.   Pain control  DVT prophylaxis  Continue PT/OT  I will keep him n.p.o. tonight after midnight and will evaluate him in the morning on July 8 , remove his dressing and drain in the morning and if there is any worsening we will plan on repeat I&D however if he is doing well then we will hold off on 2nd surgery.     Celia 97, DO  7/7/2021

## 2021-07-07 NOTE — PROGRESS NOTES
[address] [facility]  consulted by Ervin Holter, APRN  for monitoring and adjustment. Indication for treatment: Cellulitis and abscess of R knee  Goal trough: 10-15 mcg/mL  AUC goal:  <500    Pertinent Laboratory Values:   Temp Readings from Last 3 Encounters:   07/07/21 98.1 °F (36.7 °C)   07/06/21 98.6 °F (37 °C)   07/03/21 98.5 °F (36.9 °C)     Recent Labs     07/04/21  1400 07/05/21  0802   WBC 10.7* 11.3*   LACTATE 0.7  --      Recent Labs     07/04/21  1400 07/05/21  0802 07/06/21  1044   BUN 18 13  --    CREATININE 0.7* 0.8* 0.8*     Estimated Creatinine Clearance: 167 mL/min (A) (based on SCr of 0.8 mg/dL (L)). Intake/Output Summary (Last 24 hours) at 7/7/2021 0811  Last data filed at 7/7/2021 0811  Gross per 24 hour   Intake 2059.58 ml   Output 1650 ml   Net 409.58 ml     Pertinent Cultures:  Date    Source    Results  7/4   Blood    NGTD  7/5   MRSA nares   Pending   7/6   R knee incision  Pending            Vancomycin level:   TROUGH:    Recent Labs     07/06/21 1959   VANCOTROUGH 4.0*     RANDOM:  No results for input(s): VANCORANDOM in the last 72 hours. Assessment:  · WBC and temperature: WBC previously elevated/afebrile  · SCr, BUN, and urine output: stable  · Day(s) of therapy: 4  · Vancomycin concentration:   · 7/6: 4.0, sub-therapeutic ()    Plan:  · Increase dose to 1750 mg q12h   · Bayesian dosing predicts an AUC of 400 and a trough of 10.0  · Accumulation is expected 2/2 BMI   · Pharmacy will continue to monitor patient and adjust therapy as indicated    VANCOMYCIN CONCENTRATION SCHEDULED FOR 7/9 AM     Thank you for the consult.   AUTUMN Guzman [facility], PharmD, BCPS   7/7/2021 8:11 AM

## 2021-07-07 NOTE — PROGRESS NOTES
Infectious Disease Progress Note  2021   Patient Name: Bhavna Davis : 1985   Impression  · Right knee postoperative wound infection: Status post right patella tendon repair on 2021. Underwent incision and drainage of right knee with excision of dermal spine graft on 2021. Culture positive for MSSA. Full culture results are pending. Clinically improving. Plan  · Discontinue vancomycin and cefepime. Start cefazolin, continue metronidazole. If culture results remain the same then will discontinue metronidazole. Plan on intravenous cefazolin for 4 weeks    Ongoing Antimicrobial Therapy  Cefazolin  Metronidazole ? Completed Antimicrobial Therapy  Vancomycin and cefepime    Reason for visit: ? Follow-up for postoperative wound infection of the right knee  History:? Interval history noted  Denies n/v/d/f or untoward effects of antibiotics  Physical Exam:  Vital Signs: BP (!) 116/57   Pulse 88   Temp 98 °F (36.7 °C) (Oral)   Resp 17   Ht 5' 9\" (1.753 m)   Wt 270 lb (122.5 kg)   SpO2 94%   BMI 39.87 kg/m²     Gen: alert and oriented X3, no distress  Skin: no stigmata of endocarditis  Wounds: Right knee dressing C/D/I  HEMT: AT/NC Oropharynx pink, moist, and without lesions or exudates; dentition in good state of repair  Eyes: PERRLA, EOMI, conjunctiva pink, sclera anicteric. Neck: Supple. Trachea midline. No LAD. Chest: no distress and CTA. Good air movement. Heart: RRR and no MRG. Abd: soft, non-distended, no tenderness, no hepatomegaly. Normoactive bowel sounds. Ext: Right knee immobilizer  Catheter Site: without erythema or tenderness  Neuro: Mental status intact. CN 2-12 intact and no focal sensory or motor deficits     Radiologic / Imaging / TESTING  No results found. Labs:    Recent Results (from the past 24 hour(s))   Vancomycin, trough    Collection Time: 21  7:59 PM   Result Value Ref Range    Vancomycin Tr 4.0 (L) 10 - 20 UG/ML    DOSE AMOUNT DOSE AMT.  GIVEN -

## 2021-07-08 VITALS
HEART RATE: 82 BPM | BODY MASS INDEX: 39.99 KG/M2 | DIASTOLIC BLOOD PRESSURE: 81 MMHG | TEMPERATURE: 98 F | RESPIRATION RATE: 16 BRPM | WEIGHT: 270 LBS | SYSTOLIC BLOOD PRESSURE: 125 MMHG | OXYGEN SATURATION: 97 % | HEIGHT: 69 IN

## 2021-07-08 LAB
ANION GAP SERPL CALCULATED.3IONS-SCNC: 9 MMOL/L (ref 4–16)
BUN BLDV-MCNC: 16 MG/DL (ref 6–23)
CALCIUM SERPL-MCNC: 8.6 MG/DL (ref 8.3–10.6)
CHLORIDE BLD-SCNC: 103 MMOL/L (ref 99–110)
CO2: 28 MMOL/L (ref 21–32)
CREAT SERPL-MCNC: 0.7 MG/DL (ref 0.9–1.3)
CULTURE: ABNORMAL
CULTURE: ABNORMAL
GFR AFRICAN AMERICAN: >60 ML/MIN/1.73M2
GFR NON-AFRICAN AMERICAN: >60 ML/MIN/1.73M2
GLUCOSE BLD-MCNC: 90 MG/DL (ref 70–99)
GRAM SMEAR: ABNORMAL
HCT VFR BLD CALC: 35.3 % (ref 42–52)
HEMOGLOBIN: 11.4 GM/DL (ref 13.5–18)
HIGH SENSITIVE C-REACTIVE PROTEIN: 50.9 MG/L
Lab: ABNORMAL
MCH RBC QN AUTO: 29.8 PG (ref 27–31)
MCHC RBC AUTO-ENTMCNC: 32.3 % (ref 32–36)
MCV RBC AUTO: 92.4 FL (ref 78–100)
PDW BLD-RTO: 12.2 % (ref 11.7–14.9)
PLATELET # BLD: 444 K/CU MM (ref 140–440)
PMV BLD AUTO: 8.6 FL (ref 7.5–11.1)
POTASSIUM SERPL-SCNC: 4 MMOL/L (ref 3.5–5.1)
RBC # BLD: 3.82 M/CU MM (ref 4.6–6.2)
SODIUM BLD-SCNC: 140 MMOL/L (ref 135–145)
SPECIMEN: ABNORMAL
WBC # BLD: 8.9 K/CU MM (ref 4–10.5)

## 2021-07-08 PROCEDURE — 36415 COLL VENOUS BLD VENIPUNCTURE: CPT

## 2021-07-08 PROCEDURE — 6360000002 HC RX W HCPCS: Performed by: ORTHOPAEDIC SURGERY

## 2021-07-08 PROCEDURE — 6370000000 HC RX 637 (ALT 250 FOR IP): Performed by: ORTHOPAEDIC SURGERY

## 2021-07-08 PROCEDURE — 6360000002 HC RX W HCPCS: Performed by: INTERNAL MEDICINE

## 2021-07-08 PROCEDURE — 36410 VNPNXR 3YR/> PHY/QHP DX/THER: CPT

## 2021-07-08 PROCEDURE — 94761 N-INVAS EAR/PLS OXIMETRY MLT: CPT

## 2021-07-08 PROCEDURE — 99232 SBSQ HOSP IP/OBS MODERATE 35: CPT | Performed by: INTERNAL MEDICINE

## 2021-07-08 PROCEDURE — 2580000003 HC RX 258: Performed by: INTERNAL MEDICINE

## 2021-07-08 PROCEDURE — 2500000003 HC RX 250 WO HCPCS: Performed by: INTERNAL MEDICINE

## 2021-07-08 PROCEDURE — C1751 CATH, INF, PER/CENT/MIDLINE: HCPCS

## 2021-07-08 PROCEDURE — 85027 COMPLETE CBC AUTOMATED: CPT

## 2021-07-08 PROCEDURE — 05HB33Z INSERTION OF INFUSION DEVICE INTO RIGHT BASILIC VEIN, PERCUTANEOUS APPROACH: ICD-10-PCS | Performed by: INTERNAL MEDICINE

## 2021-07-08 PROCEDURE — 80048 BASIC METABOLIC PNL TOTAL CA: CPT

## 2021-07-08 PROCEDURE — 76937 US GUIDE VASCULAR ACCESS: CPT

## 2021-07-08 PROCEDURE — 86141 C-REACTIVE PROTEIN HS: CPT

## 2021-07-08 PROCEDURE — 2580000003 HC RX 258: Performed by: ORTHOPAEDIC SURGERY

## 2021-07-08 PROCEDURE — 2500000003 HC RX 250 WO HCPCS: Performed by: ORTHOPAEDIC SURGERY

## 2021-07-08 RX ORDER — CEFAZOLIN SODIUM 1 G/3ML
2000 INJECTION, POWDER, FOR SOLUTION INTRAMUSCULAR; INTRAVENOUS EVERY 8 HOURS
Qty: 150000 MG | Refills: 0
Start: 2021-07-08 | End: 2021-08-02

## 2021-07-08 RX ORDER — LIDOCAINE HYDROCHLORIDE 10 MG/ML
5 INJECTION, SOLUTION EPIDURAL; INFILTRATION; INTRACAUDAL; PERINEURAL ONCE
Status: COMPLETED | OUTPATIENT
Start: 2021-07-08 | End: 2021-07-08

## 2021-07-08 RX ORDER — SODIUM CHLORIDE 9 MG/ML
25 INJECTION, SOLUTION INTRAVENOUS PRN
Status: DISCONTINUED | OUTPATIENT
Start: 2021-07-08 | End: 2021-07-08 | Stop reason: HOSPADM

## 2021-07-08 RX ORDER — SODIUM CHLORIDE 0.9 % (FLUSH) 0.9 %
5-40 SYRINGE (ML) INJECTION PRN
Status: DISCONTINUED | OUTPATIENT
Start: 2021-07-08 | End: 2021-07-08 | Stop reason: HOSPADM

## 2021-07-08 RX ORDER — SODIUM CHLORIDE 0.9 % (FLUSH) 0.9 %
5-40 SYRINGE (ML) INJECTION EVERY 12 HOURS SCHEDULED
Status: DISCONTINUED | OUTPATIENT
Start: 2021-07-08 | End: 2021-07-08 | Stop reason: HOSPADM

## 2021-07-08 RX ORDER — OXYCODONE HYDROCHLORIDE AND ACETAMINOPHEN 5; 325 MG/1; MG/1
1 TABLET ORAL EVERY 6 HOURS PRN
Qty: 15 TABLET | Refills: 0 | Status: SHIPPED | OUTPATIENT
Start: 2021-07-08 | End: 2021-07-13

## 2021-07-08 RX ADMIN — CEFAZOLIN SODIUM 2000 MG: 10 INJECTION, POWDER, FOR SOLUTION INTRAVENOUS at 14:55

## 2021-07-08 RX ADMIN — LIDOCAINE HYDROCHLORIDE 5 ML: 10 INJECTION, SOLUTION EPIDURAL; INFILTRATION; INTRACAUDAL; PERINEURAL at 11:02

## 2021-07-08 RX ADMIN — METRONIDAZOLE 500 MG: 500 INJECTION, SOLUTION INTRAVENOUS at 01:36

## 2021-07-08 RX ADMIN — LISINOPRIL AND HYDROCHLOROTHIAZIDE 1 TABLET: 12.5; 1 TABLET ORAL at 11:32

## 2021-07-08 RX ADMIN — CEFAZOLIN SODIUM 2000 MG: 10 INJECTION, POWDER, FOR SOLUTION INTRAVENOUS at 05:36

## 2021-07-08 RX ADMIN — SODIUM CHLORIDE, PRESERVATIVE FREE 10 ML: 5 INJECTION INTRAVENOUS at 11:33

## 2021-07-08 RX ADMIN — SERTRALINE HYDROCHLORIDE 50 MG: 50 TABLET ORAL at 11:31

## 2021-07-08 RX ADMIN — SODIUM CHLORIDE, POTASSIUM CHLORIDE, SODIUM LACTATE AND CALCIUM CHLORIDE: 600; 310; 30; 20 INJECTION, SOLUTION INTRAVENOUS at 05:35

## 2021-07-08 RX ADMIN — ONDANSETRON 4 MG: 4 TABLET, ORALLY DISINTEGRATING ORAL at 16:49

## 2021-07-08 RX ADMIN — ENOXAPARIN SODIUM 40 MG: 40 INJECTION SUBCUTANEOUS at 11:32

## 2021-07-08 ASSESSMENT — PAIN SCALES - GENERAL
PAINLEVEL_OUTOF10: 7
PAINLEVEL_OUTOF10: 2

## 2021-07-08 ASSESSMENT — ENCOUNTER SYMPTOMS
CONSTIPATION: 0
DIARRHEA: 0
SORE THROAT: 0
ABDOMINAL PAIN: 0
EYES NEGATIVE: 1
COUGH: 0
SHORTNESS OF BREATH: 0
NAUSEA: 0
VOMITING: 0

## 2021-07-08 ASSESSMENT — PAIN DESCRIPTION - PROGRESSION
CLINICAL_PROGRESSION: NOT CHANGED

## 2021-07-08 ASSESSMENT — PAIN DESCRIPTION - ORIENTATION: ORIENTATION: RIGHT

## 2021-07-08 ASSESSMENT — PAIN DESCRIPTION - DESCRIPTORS: DESCRIPTORS: BURNING;SORE

## 2021-07-08 ASSESSMENT — PAIN DESCRIPTION - LOCATION: LOCATION: KNEE

## 2021-07-08 ASSESSMENT — PAIN DESCRIPTION - PAIN TYPE: TYPE: SURGICAL PAIN

## 2021-07-08 ASSESSMENT — PAIN DESCRIPTION - ONSET: ONSET: ON-GOING

## 2021-07-08 NOTE — PROGRESS NOTES
Infectious Disease Progress Note  2021   Patient Name: Bonifacio Mccord : 1985   Impression  · Right knee postoperative wound infection: Status post right patella tendon repair on 2021. Underwent incision and drainage of right knee with excision of dermal spine graft on 2021. Culture positive for MSSA. Full culture results are pending. Clinically improving. Plan  · Cefazolin 2g IV q 8h for 28 days (end -date: 2021)  After discharge the following should be done:  Weekly labs drawn on Monday during the course of treatment  CBC with differential, CMP, ESR, CRP  [facility]  # [phone]  See in clinic within one week after discharge  · Disposition: home with IV ATB  · continue metronidazole. If culture results remain the same then will discontinue metronidazole. Plan on intravenous cefazolin for 4 weeks    Ongoing Antimicrobial Therapy  Cefazolin    Completed Antimicrobial Therapy  Vancomycin and cefepime  Metronidazole ? Reason for visit: ? Follow-up for postoperative wound infection of the right knee  History:? Interval history noted  Denies n/v/d/f or untoward effects of antibiotics  Physical Exam:  Vital Signs: /64   Pulse 78   Temp 98 °F (36.7 °C) (Oral)   Resp 18   Ht 5' 9\" (1.753 m)   Wt 270 lb (122.5 kg)   SpO2 96%   BMI 39.87 kg/m²     Gen: alert and oriented X3, no distress  Skin: no stigmata of endocarditis  Wounds: Right knee dressing C/D/I  HEMT: AT/NC Oropharynx pink, moist, and without lesions or exudates; dentition in good state of repair  Eyes: PERRLA, EOMI, conjunctiva pink, sclera anicteric. Neck: Supple. Trachea midline. No LAD. Chest: no distress and CTA. Good air movement. Heart: RRR and no MRG. Abd: soft, non-distended, no tenderness, no hepatomegaly. Normoactive bowel sounds. Ext: Right knee immobilizer  Catheter Site: without erythema or tenderness  Neuro: Mental status intact.  CN 2-12 intact and no focal sensory or motor deficits     Radiologic / Imaging / TESTING  No results found. Labs:    Recent Results (from the past 24 hour(s))   CBC    Collection Time: 07/08/21  8:51 AM   Result Value Ref Range    WBC 8.9 4.0 - 10.5 K/CU MM    RBC 3.82 (L) 4.6 - 6.2 M/CU MM    Hemoglobin 11.4 (L) 13.5 - 18.0 GM/DL    Hematocrit 35.3 (L) 42 - 52 %    MCV 92.4 78 - 100 FL    MCH 29.8 27 - 31 PG    MCHC 32.3 32.0 - 36.0 %    RDW 12.2 11.7 - 14.9 %    Platelets 437 (H) 312 - 440 K/CU MM    MPV 8.6 7.5 - 11.1 FL     CULTURE results: Invalid input(s): BLOOD CULTURE,  URINE CULTURE, SURGICAL CULTURE    Diagnosis:  Patient Active Problem List   Diagnosis    Essential hypertension    Situational anxiety    History of attention deficit disorder    Rupture of right patellar tendon    Cellulitis of right knee    Postoperative infection       Active Problems  Active Problems:    Cellulitis of right knee    Postoperative infection  Resolved Problems:    * No resolved hospital problems.  *    Electronically signed by: Electronically signed by Pk Mariscal MD on 7/8/2021 at 9:21 AM

## 2021-07-08 NOTE — CONSULTS
Consult completed. Procedure/rationale explained to pt including rationale. 4.5Fr 15cm Arrow G+mark Blue Advance Midline initiated to RUE Basilic Vein using sterile, UltraSound-guided technique. Sterile dressing with SkinPrep, StatLock Securing Device, BioPatch, & SwabCap applied. Pt tolerated well. Gil Maria Primary RN notified.

## 2021-07-08 NOTE — DISCHARGE SUMMARY
Physician Discharge Summary     Patient ID:  Guicho Chan  [id number]  28 y.o.  1985    Admit date: 7/4/2021    Discharge date and time: No discharge date for patient encounter. Admitting Physician: No admitting provider for patient encounter. Discharge Physician: YOSELIN Edwards - CNP      Admission Diagnoses: Cellulitis of right knee [L03.115]  Cellulitis of right leg [L03.115]  Postoperative infection, unspecified type, initial encounter [T81.40XA]    Discharge Diagnoses: Cellulitis of R knee, Post operative infection     Admission Condition: fair    Discharged Condition: good    Hospital Course:    28 y. o.  male  who presents with  worsening right knee pain and swellin and failed outpatient oral antibiotics    #  Cellulitis/abscess of right knee. Post operative infection,  Status post right patella tendon rupture and repair 06/22/2021. On oral doxycycline after discharge. CT of right knee showed possible abscess and cellulitis. · PICC line ordered -Cefazolin 2g IV q 8h for 28 days (end -date: 8/2/2021)  · After discharge the following should be done:  · Weekly labs drawn on Monday during the course of treatment  · CBC with differential, CMP, ESR, CRP  · Fax results to Attn: Suzi Cordero Infectious Diseases Staff  · # [phone]  · See in clinic within one week after discharge  · Disposition: home with IV ATB         Status post right knee I&D 07/06/2021. 500 cc purulent fluid evacuated. Pending abscess fluid cx  No additional surgery required at this point. Continue nonweightbearing on right lower extremity. Continue hinged knee brace from 0 to 45 degrees at all times except for bathing and while at rest.  Continue IV antibiotics.   Ortho OK for discharge .     #  Essential hypertension  BP controlled, continue home medication.     #  Obesity  Weight control discussed with patient.       #  Anxiety/depression  Continue home medication.     Consults: ID and orthopedic surgery    Significant Diagnostic Studies:  7/4 CT Knee   Rim enhancing fluid collection centered about the prepatellar soft tissues   measuring approximately 1.8 x 8.4 x 10.9 cm and containing gas.  Findings   highly suggestive of abscess.  A small joint effusion is also present. Communication of the joint fluid and anterior rim enhancing collection cannot   be excluded. 2. Mild periosteal reaction along the patella.  This is nonspecific in the   recent postoperative setting with osteomyelitis not excluded given   surrounding soft tissue changes. Dixie Tarun Order Results     Date and Time Order Name Status Description    7/4/2021  2:26 PM Culture, Blood 2 Preliminary     7/4/2021  2:00 PM Culture, Blood 1 Preliminary           Discharge Exam:  General Appearance:  Comfortable and in no acute distress. Vital signs: (most recent): Blood pressure 126/64, pulse 78, temperature 98 °F (36.7 °C), temperature source Oral, resp. rate 18, height 5' 9\" (1.753 m), weight 270 lb (122.5 kg), SpO2 96 %. Vital signs are normal.  No fever. Output: Producing urine and producing stool. HEENT: Normal HEENT exam.    Lungs:  Normal effort and normal respiratory rate. He is not in respiratory distress. Heart: Normal rate. S1 normal and S2 normal.  No murmur, gallop or friction rub. Chest: Symmetric chest wall expansion. No chest wall tenderness. Abdomen: Abdomen is soft and flat. Bowel sounds are normal.   There is no abdominal tenderness. Extremities: (R Knee in Hinged knee brace  with incision patellar incision  incision clean, dry, intact, mild bloody drainage no calf TTP, compartments soft, NVID  No pain with passive range of motion of the right knee.)  Pulses: Distal pulses are intact. Skin:  Warm.         Disposition: home         Medication List      START taking these medications    ceFAZolin 1 g injection  Commonly known as: ANCEF  Infuse 2,000 mg intravenously every 8 hours for 25 days oxyCODONE-acetaminophen 5-325 MG per tablet  Commonly known as: PERCOCET  Take 1 tablet by mouth every 6 hours as needed for Pain for up to 5 days. CONTINUE taking these medications    fluocinonide 0.05 % external solution  Commonly known as: LIDEX     lisinopril-hydroCHLOROthiazide 10-12.5 MG per tablet  Commonly known as: PRINZIDE;ZESTORETIC  Take 1 tablet by mouth daily     sertraline 50 MG tablet  Commonly known as: ZOLOFT  Take 1 tablet by mouth daily        STOP taking these medications    doxycycline hyclate 100 MG tablet  Commonly known as: VIBRA-TABS           Where to Get Your Medications      You can get these medications from any pharmacy    Bring a paper prescription for each of these medications  · oxyCODONE-acetaminophen 5-325 MG per tablet     Information about where to get these medications is not yet available    Ask your nurse or doctor about these medications  · ceFAZolin 1 g injection               Follow-up with Orthopedics and ID as instructed  in 1-2 weeks.       Time Spent on Discharge 40  minutes   Signed:  YOSELIN White CNP  7/8/2021  12:20 PM

## 2021-07-08 NOTE — PROGRESS NOTES
Progress Note  Date:2021       Room:[facility]-A  Patient Amanuel Royal     YOB: 1985     Age:35 y.o. Subjective    Subjective:  Symptoms:  Stable. No shortness of breath, cough, chest pain, anorexia or diarrhea. Diet:  Adequate intake. No nausea or vomiting. Activity level: Returning to normal.    Pain:  He reports pain is improving. Pain is well controlled. Review of Systems   Constitutional: Negative. Negative for fever. HENT: Negative for congestion and sore throat. Eyes: Negative. Respiratory: Negative for cough and shortness of breath. Cardiovascular: Negative for chest pain and leg swelling. Gastrointestinal: Negative for abdominal pain, anorexia, constipation, diarrhea, nausea and vomiting. Endocrine: Negative. Genitourinary: Negative for dysuria and hematuria. Musculoskeletal: Negative for neck pain. Skin: Negative for wound. Neurological: Negative for dizziness and light-headedness. All other systems reviewed and are negative. Objective         Vitals Last 24 Hours:  TEMPERATURE:  Temp  Av.4 °F (36.9 °C)  Min: 98 °F (36.7 °C)  Max: 98.8 °F (37.1 °C)  RESPIRATIONS RANGE: Resp  Av  Min: 17  Max: 20  PULSE OXIMETRY RANGE: SpO2  Av %  Min: 94 %  Max: 96 %  PULSE RANGE: Pulse  Av.8  Min: 78  Max: 92  BLOOD PRESSURE RANGE: Systolic (16XXB), ZO , Min:116 , HZA:383   ; Diastolic (02GFV), GAE:56, Min:56, Max:74    I/O (24Hr): Intake/Output Summary (Last 24 hours) at 2021 0713  Last data filed at 2021 5347  Gross per 24 hour   Intake 960 ml   Output 2425 ml   Net -1465 ml     Objective:  General Appearance:  Comfortable and in no acute distress. Vital signs: (most recent): Blood pressure 126/64, pulse 78, temperature 98 °F (36.7 °C), temperature source Oral, resp. rate 18, height 5' 9\" (1.753 m), weight 270 lb (122.5 kg), SpO2 96 %. Vital signs are normal.  No fever.     Output: Producing urine and producing stool. HEENT: Normal HEENT exam.    Lungs:  Normal effort and normal respiratory rate. He is not in respiratory distress. Heart: Normal rate. S1 normal and S2 normal.  No murmur, gallop or friction rub. Chest: Symmetric chest wall expansion. No chest wall tenderness. Abdomen: Abdomen is soft and flat. Bowel sounds are normal.   There is no abdominal tenderness. Extremities: (R Knee in Hinged knee brace  with incision patellar incision  incision clean, dry, intact, mild bloody drainage no calf TTP, compartments soft, NVID  No pain with passive range of motion of the right knee.)  Pulses: Distal pulses are intact. Skin:  Warm. Labs/Imaging/Diagnostics    Labs:  CBC:  Recent Labs     07/05/21  0802   WBC 11.3*   RBC 4.26*   HGB 13.0*   HCT 39.2*   MCV 92.0   RDW 12.4        CHEMISTRIES:  Recent Labs     07/05/21  0802 07/06/21  1044 07/07/21  0857     --   --    K 4.5  --   --    CL 97*  --   --    CO2 28  --   --    BUN 13  --   --    CREATININE 0.8* 0.8* 0.7*   GLUCOSE 110*  --   --      PT/INR:No results for input(s): PROTIME, INR in the last 72 hours. APTT:No results for input(s): APTT in the last 72 hours. LIVER PROFILE:No results for input(s): AST, ALT, BILIDIR, BILITOT, ALKPHOS in the last 72 hours. Imaging Last 24 Hours:  No results found. Assessment//Plan           Hospital Problems         Last Modified POA    Cellulitis of right knee 7/5/2021 Yes    Postoperative infection 7/5/2021 Yes        Assessment & Plan  #  Cellulitis/abscess of right knee. Post operative infection,  Status post right patella tendon rupture and repair 06/22/2021. On oral doxycycline after discharge. CT of right knee showed possible abscess and cellulitis.   · PICC line ordered -Cefazolin 2g IV q 8h for 28 days (end -date: 8/2/2021)  · After discharge the following should be done:  · Weekly labs drawn on Monday during the course of treatment  · CBC with differential, CMP, ESR, CRP  · Fax results to Attn: Suzi Cordero Infectious Diseases Staff  · # [phone]  · See in clinic within one week after discharge  · Disposition: home with IV ATB    Continue metronidazole. If culture results remain the same then will discontinue metronidazole. Plan on intravenous cefazolin for 4 weeks      Status post right knee I&D 07/06/2021. 500 cc purulent fluid evacuated. Pending abscess fluid cx  No additional surgery required at this point. Continue nonweightbearing on right lower extremity. Continue hinged knee brace from 0 to 45 degrees at all times except for bathing and while at rest.  Continue IV antibiotics. Pain control  DVT prophylaxis  Continue PT/OT  Orthopedically he is stable for discharge, follow-up in office in 2 weeks.     #  Essential hypertension  BP controlled, continue home medication.     #  Obesity  Weight control discussed with patient.       #  Anxiety/depression  Continue home medication. Plan discharge today if HHC/  Home IV antibiotics are arranged         Diet ADULT DIET; Regular   DVT Prophylaxis [x] Lovenox, []  Heparin, [] SCDs, [] Ambulation   GI Prophylaxis [] PPI,  [] H2 Blocker,  [] Carafate,  [x] Diet/Tube Feeds   Code Status Full Code   Disposition Possible discharge once HHC/ IV antibiotics are arranged pending insurance approval      MDM [] Low, [x] Moderate,[]  High  Patient's risk as above due to        All testing  and results reviewed with patient . All questions answered.  Patient and family voiced understanding               Electronically signed by YOSELIN Tsang CNP on 7/8/21 at 7:13 AM EDT

## 2021-07-08 NOTE — PROGRESS NOTES
Natty Mendoza is a 28 y.o. male patient. 1. Cellulitis of right knee    2. Postoperative infection, unspecified type, initial encounter      History reviewed. No pertinent past medical history. No past surgical history pertinent negatives on file. Scheduled Meds:   ceFAZolin  2,000 mg Intravenous Q8H    sodium chloride flush  10 mL Intravenous 2 times per day    metroNIDAZOLE  500 mg Intravenous Q8H    lisinopril-hydroCHLOROthiazide  1 tablet Oral Daily    sertraline  50 mg Oral Daily    sodium chloride flush  5-40 mL Intravenous 2 times per day    enoxaparin  40 mg Subcutaneous Daily     Continuous Infusions:   lactated ringers 125 mL/hr at 07/08/21 5298    sodium chloride       PRN Meds:sodium chloride flush, sodium chloride, oxyCODONE-acetaminophen, sodium chloride flush, polyethylene glycol, acetaminophen **OR** acetaminophen, ondansetron **OR** ondansetron    No Known Allergies  Active Problems:    Cellulitis of right knee    Postoperative infection  Resolved Problems:    * No resolved hospital problems. *    Blood pressure 126/64, pulse 78, temperature 98 °F (36.7 °C), temperature source Oral, resp. rate 18, height 5' 9\" (1.753 m), weight 270 lb (122.5 kg), SpO2 96 %. Subjective   Patient seen and examined, resting in bed comfortably, pain controlled, no new complaints. Feeling much better today  Not having much pain in the right knee at all. Objective:  Vital signs (most recent): Blood pressure 126/64, pulse 78, temperature 98 °F (36.7 °C), temperature source Oral, resp. rate 18, height 5' 9\" (1.753 m), weight 270 lb (122.5 kg), SpO2 96 %. RLE - Dressing and drain removed, incision clean, dry, intact, mild bloody drainage no calf TTP, compartments soft, NVID  Hinged knee brace locked in extension and opened up from 0 to 45 degrees  No pain with passive range of motion of the right knee.     Assessment & Plan  POD #2 right knee I&D, Doing well postoperatively  No signs of septic arthritis    No additional surgery required at this point. Continue nonweightbearing on right lower extremity. Continue hinged knee brace from 0 to 45 degrees at all times except for bathing and while at rest.  Continue IV antibiotics. Pain control  DVT prophylaxis  Continue PT/OT  Orthopedically he is stable for discharge, follow-up in office in 2 weeks.       Celia 97, DO  7/8/2021

## 2021-07-09 ENCOUNTER — CARE COORDINATION (OUTPATIENT)
Dept: CASE MANAGEMENT | Age: 36
End: 2021-07-09

## 2021-07-09 DIAGNOSIS — T81.40XA POSTOPERATIVE INFECTION, UNSPECIFIED TYPE, INITIAL ENCOUNTER: Primary | ICD-10-CM

## 2021-07-09 LAB
CULTURE: NORMAL
CULTURE: NORMAL
Lab: NORMAL
Lab: NORMAL
SPECIMEN: NORMAL
SPECIMEN: NORMAL

## 2021-07-09 NOTE — CARE COORDINATION
[facility] Transitions Initial Follow Up Call    Call within 2 business days of discharge: Yes    Patient: Bhavna Davis Patient : 1985   MRN: [id number]  Reason for Admission: Post Op Infection, Rt Knee Cellulitis  Discharge Date: 21 RARS: Readmission Risk Score: 10  Facility: [address] [place] [address]       Complaint Diagnosis Description Type Department Provider    21 Knee Pain Cellulitis of right knee . .. ED to Hosp-Admission (Discharged) (ADMITTED) Galen Melgoza MD; Valdemar Oro... Non-face-to-face services provided:  Obtained and reviewed discharge summary and/or continuity of care documents    Care Transitions 24 Hour Call    Care Transitions Interventions       Follow Up  Future Appointments   Date Time Provider Gil Fitzpatrick   2021  9:40 AM Francy Urbina DO [facility][1] [facility]   2021 10:45 AM Jeanne Castillo MD [facility][1] ID [facility]   10/4/2021  8:00 AM Nicole Alfaro MD [facility][1] F[facility]     [phone] Message left for Evans Olvera 99 [phone] requesting call back to confirm delivery of iv atb/supplies, intake visit. 1439 1st attempt to reach for CT discharge call unsuccessful. HIPAA compliant message left requesting call back. [phone] briefly w/ Alber Avinashant, Wife/+HIPAA as she is at work. Reports Patient doing well since home. Pain controlled. IV Ancef delivered, [facility] RN visit and first infusion completed. 1111F completed. Denies resource needs. Advised to schedule 7 day hosp f/u appt in addition to above appts; transportation confirmed. Advised to contact PCP/Dr White/C  re: any health concerns. Agreeable to ongoing CT follow up.      Jolanta Ferguson, RN

## 2021-07-10 ENCOUNTER — HOSPITAL ENCOUNTER (EMERGENCY)
Age: 36
Discharge: HOME | End: 2021-07-10
Payer: COMMERCIAL

## 2021-07-10 VITALS
HEIGHT: 69 IN | SYSTOLIC BLOOD PRESSURE: 120 MMHG | HEART RATE: 85 BPM | BODY MASS INDEX: 39.99 KG/M2 | TEMPERATURE: 98.9 F | OXYGEN SATURATION: 98 % | RESPIRATION RATE: 16 BRPM | WEIGHT: 270 LBS | DIASTOLIC BLOOD PRESSURE: 75 MMHG

## 2021-07-10 DIAGNOSIS — Z78.9 PROBLEM WITH VASCULAR ACCESS: Primary | ICD-10-CM

## 2021-07-10 PROCEDURE — 99284 EMERGENCY DEPT VISIT MOD MDM: CPT

## 2021-07-10 NOTE — ED PROVIDER NOTES
Triage Chief Complaint:   Other    [place]:  Today in the ED I had the pleasure of caring for Artemio Parker who is a 28 y.o. male that presents to the emergency department for vascular access complication. Patient has a history of right-sided knee surgery. With postop infection. I&D with debridement and irrigation. He currently has a mid- line in the right upper extremity. States that he takes parenteral antibiotics through this line 3 times daily. Today when he woke up he noticed there was some blood around the Biopatch. And on his second administration of his antibiotics he felt burning in his arm which was atypical so he came here for evaluation. He denies any fever chills nausea from diarrhea. States that he is otherwise feels fine. No arm pain at rest.    ROS:  REVIEW OF SYSTEMS    At least 10 systems reviewed      All other review of systems are negative  See HPI and nursing notes for additional information       History reviewed. No pertinent past medical history.   Past Surgical History:   Procedure Laterality Date    INGUINAL HERNIA REPAIR Left 1986    LEG SURGERY Right 7/6/2021    RIGHT KNEE DEBRIDEMENT INCISION AND DRAINAGE performed by Vito Rodríguez DO at [address] [place] [address] Right 6/22/2021    RIGHT PATELLA TENDON REPAIR performed by Vito Rodríguez DO at [facility] OR     Family History   Problem Relation Age of Onset    Other Mother         Hodgekin's    Cancer Father         Testicular     Social History     Socioeconomic History    Marital status:      Spouse name: Not on file    Number of children: Not on file    Years of education: Not on file    Highest education level: Not on file   Occupational History    Not on file   Tobacco Use    Smoking status: Never Smoker    Smokeless tobacco: Former User     Types: Chew   Vaping Use    Vaping Use: Never used   Substance and Sexual Activity    Alcohol use: Yes     Comment: Occasionally    Drug use: Never    Sexual activity: Yes     Partners: Female   Other Topics Concern    Not on file   Social History Narrative    Not on file     Social Determinants of Health     Financial Resource Strain: Low Risk     Difficulty of Paying Living Expenses: Not hard at all   Food Insecurity: No Food Insecurity    Worried About Running Out of Food in the Last Year: Never true    [address] in the Last Year: Never true   Transportation Needs: No Transportation Needs    Lack of Transportation (Medical): No    Lack of Transportation (Non-Medical): No   Physical Activity:     Days of Exercise per Week:     Minutes of Exercise per Session:    Stress:     Feeling of Stress :    Social Connections:     Frequency of Communication with Friends and Family:     Frequency of Social Gatherings with Friends and Family:     Attends Orthodoxy Services:     Active Member of Clubs or Organizations:     Attends Club or Organization Meetings:     Marital Status:    Intimate Partner Violence:     Fear of Current or Ex-Partner:     Emotionally Abused:     Physically Abused:     Sexually Abused:      Current Facility-Administered Medications   Medication Dose Route Frequency Provider Last Rate Last Admin    alteplase (CATHFLO) injection 1 mg  1 mg Intracatheter Once Cami Verdugo PA-C         Current Outpatient Medications   Medication Sig Dispense Refill    oxyCODONE-acetaminophen (PERCOCET) 5-325 MG per tablet Take 1 tablet by mouth every 6 hours as needed for Pain for up to 5 days.  15 tablet 0    ceFAZolin (ANCEF) 1 g injection Infuse 2,000 mg intravenously every 8 hours for 25 days [id number] mg 0    fluocinonide (LIDEX) 0.05 % external solution apply to rash ON scalp twice a day if needed for psoriasis FLARES      sertraline (ZOLOFT) 50 MG tablet Take 1 tablet by mouth daily 90 tablet 1    lisinopril-hydroCHLOROthiazide (PRINZIDE;ZESTORETIC) 10-12.5 MG per tablet Take 1 tablet by mouth daily 30 tablet 5     No Known Allergies    Nursing Notes Reviewed    Physical Exam:  ED Triage Vitals [07/10/21 1638]   Enc Vitals Group      /75      Pulse 85      Resp 16      Temp 98.9 °F (37.2 °C)      Temp Source Oral      SpO2 98 %      Weight 270 lb (122.5 kg)      Height 5' 9\" (1.753 m)      Head Circumference       Peak Flow       Pain Score       Pain Loc       Pain Edu? Excl. in [address]? General :Patient is awake alert oriented person place and time no acute distress nontoxic appearing  HEENT: Pupils are equally round and reactive to light extraocular motors are intact conjunctivae clear sclerae white there is no injection no icterus. Nose without any rhinorrhea or epistaxis. Oral mucosa is moist no exudate buccal mucosa shows no ulcerations. Uvula is midline    Neck: Neck is supple full range of motion trachea midline thyroid nonpalpable  Cardiac: Heart regular rate rhythm no murmurs rubs clicks or gallops  Lungs: Lungs are clear to auscultation there is no wheezing rhonchi or rales. There is no use of accessory muscles no nasal flaring identified. Chest wall: There is no tenderness to palpation over the chest wall or over ribs  Abdomen: Abdomen is soft nontender nondistended. There is no firm or pulsatile masses no rebound rigidity or guarding negative Riggs's negative McBurney, no peritoneal signs  Suprapubic:  there is no tenderness to palpation over the external bladder   Musculoskeletal: 5 out of 5 strength in all 4 extremities full flexion extension abduction and adduction supination pronation of all extremities and all digits. No obvious muscle atrophy is noted. No focal muscle deficits are appreciated  Dermatology: Skin is warm and dry there is no obvious abscesses lacerations or lesions noted. Along the right bicep region. There is a PICC line in place. With a Biopatch and sterile dressing noted. There is no active bleeding there is no discharge there is no dehiscence.   There is no redness or streaking no crepitus noted. No palpable masses. Psych: Mentation is grossly normal cognition is grossly normal. Affect is appropriate  Neuro: Motor intact sensory intact cranial nerves II through XII are intact level of consciousness is normal cerebellar function is normal reflexes are grossly normal. No evidence of incontinence or loss of bowel or bladder no saddle anesthesia noted Lymphatic: There is no submandibular or cervical adenopathy appreciated. I have reviewed and interpreted all of the currently available lab results from this visit (if applicable):  No results found for this visit on 07/10/21. Radiographs (if obtained):  [] The following radiograph was interpreted by myself in the absence of a radiologist:   [] Radiologist's Report Reviewed:  No orders to display       EKG (if obtained):   Please See Note of attending physician for EKG interpretation. Chart review shows recent radiograph(s):  XR KNEE RIGHT (3 VIEWS)    Result Date: 7/3/2021  EXAMINATION: THREE XRAY VIEWS OF THE RIGHT KNEE 7/3/2021 12:45 pm COMPARISON: 06/16/2021 HISTORY: ORDERING SYSTEM PROVIDED HISTORY: post op pain TECHNOLOGIST PROVIDED HISTORY: Reason for exam:->post op pain Reason for Exam: post op pain Acuity: Acute Type of Exam: Initial Mechanism of Injury: reports his dog stepped on his knee yesterday Relevant Medical/Surgical History: Rt knee surgery on 6/22, rt knee swelling FINDINGS: Surgical skin staples are present anteriorly with soft tissue swelling. Improved alignment of the patella. No joint effusion. Joint spaces are maintained. No acute fracture. Postoperative changes with improved patellar alignment. Associated prepatellar soft tissue swelling. Otherwise no acute abnormality. XR KNEE RIGHT (MIN 4 VIEWS)    Result Date: 6/16/2021  EXAMINATION: FOUR XRAY VIEWS OF THE RIGHT KNEE 6/16/2021 8:15 pm COMPARISON: None.  HISTORY: ORDERING SYSTEM PROVIDED HISTORY: injury after knee \"buckled\" TECHNOLOGIST PROVIDED HISTORY: Reason for exam:->injury after knee \"buckled\" Reason for Exam: injury after knee \"buckled\" Acuity: Acute Type of Exam: Initial Mechanism of Injury: injury after knee \"buckled\" Relevant Medical/Surgical History: injury after knee \"buckled\" FINDINGS: Ossific density measuring approximately 5 mm projecting inferior to the patella along the patella tendon. This is age indeterminate. Soft tissue swelling and mild irregularity along the anterior soft tissues in expected position of the patella tendon. No significant joint effusion     Mild irregularity of the soft tissues of the anterior aspect of the knee and small ossific density inferior to the patella. Findings may represent a loose body or avulsion fracture fragment. Please correlate for potential a disruption of the extensor mechanism, patella tendon. Consider follow-up MRI to further evaluate as clinically indicated. CT KNEE RIGHT W CONTRAST    Result Date: 7/4/2021  EXAMINATION: CT OF THE RIGHT KNEE WITH CONTRAST 7/4/2021 7:45 pm TECHNIQUE: CT of the right knee was performed with the administration of intravenous contrast.  Multiplanar reformatted images are provided for review. Dose modulation, iterative reconstruction, and/or weight based adjustment of the mA/kV was utilized to reduce the radiation dose to as low as reasonably achievable. COMPARISON: Right knee radiograph July 3, 2021; right knee radiograph June 16, 2020 HISTORY ORDERING SYSTEM PROVIDED HISTORY: Infection, rule out abscess TECHNOLOGIST PROVIDED HISTORY: Reason for exam:->Infection, rule out abscess Reason for Exam: Infection, rule out abscess Acuity: Acute Type of Exam: Initial Additional signs and symptoms: no Relevant Medical/Surgical History: 80 ml isovue 370 used Surgery June 22, 2021 FINDINGS: Bones: No acute fracture identified. Postoperative changes of the extensor mechanism and patella noted with several linear surgical tracts extending through the patella.   Mild [id number] [phone]    In 1 day      Disposition medications (if applicable):  New Prescriptions    No medications on file         Comment: Please note this report has been produced using speech recognition software and may contain errors related to that system including errors in grammar, punctuation, and spelling, as well as words and phrases that may be inappropriate. If there are any questions or concerns please feel free to contact the dictating provider for clarification.       [facility], [address], [place]  07/10/21 1329

## 2021-07-10 NOTE — ED NOTES
Patient noticed leaking from midline this morning and burning with antibiotic.  Home nurse instructed patient to come to ER to have evaluated     Jaylyn Powers RN  07/10/21 1952

## 2021-07-11 NOTE — CARE COORDINATION
Patient identified as potential readmission. Last admission 7/4-7/8 for cellulitis. Patient here today for c/o mid-line leaking and burning sensation to right arm. Patient was recently admitted for I&D with wound debridement. Patient was placed on IV antibiotics 3 times daily to be administered through mid-line. No acute findings requiring admission identified today. Readmission was avoided and patient was able to be discharged home for outpatient follow up.

## 2021-07-12 DIAGNOSIS — Z79.2 RECEIVING INTRAVENOUS ANTIBIOTIC TREATMENT AT HOME: Primary | ICD-10-CM

## 2021-07-12 DIAGNOSIS — L03.115 CELLULITIS OF RIGHT KNEE: ICD-10-CM

## 2021-07-12 NOTE — PROGRESS NOTES
7/12/21 - Pre-op instructions given for procedure on 7/13/21. You may eat and take morning medications. Enter thru main entrance, go to information desk and check in.

## 2021-07-13 ENCOUNTER — HOSPITAL ENCOUNTER (OUTPATIENT)
Dept: SURGERY | Age: 36
Discharge: HOME | End: 2021-07-15
Attending: INTERNAL MEDICINE | Admitting: INTERNAL MEDICINE
Payer: COMMERCIAL

## 2021-07-13 PROCEDURE — C1751 CATH, INF, PER/CENT/MIDLINE: HCPCS

## 2021-07-13 PROCEDURE — 76937 US GUIDE VASCULAR ACCESS: CPT

## 2021-07-13 PROCEDURE — 36569 INSJ PICC 5 YR+ W/O IMAGING: CPT

## 2021-07-13 PROCEDURE — 36573 INSJ PICC RS&I 5 YR+: CPT

## 2021-07-13 NOTE — PROGRESS NOTES
2257- Repot given to this nurse from [address] [place] [address].  1001- pressure applied to PICC line site at this time held for 10 minutes. 1012-  Bleeding has stopped,clean dressing applied to PICC site. 1030- No signs of bleeding. 1050- dressing C/D/I  1105- dressing C/D/I discharge instruction reviewed with patient, patient verbalized understanding. 1107- Patient transported to car via w/c, Dad is transporting home.

## 2021-07-13 NOTE — CONSULTS
Consult completed. Procedure/rationale explained to pt, including benefits vs potential risks; questions answered & consent obtained. Single Lumen #4.5 Fr ArrowG+mark Blue Advance PICC initiated to LUE Basilic Vein using sterile, UltraSound-guided technique without difficulty/complications. Positioning verified via VPSg4 & 3CG with appropriate P-wave changes noted. Sterile dressing applied with SkinPrep, StatLock, statSeal, and SwabCaps. Pt tolerated well & denies other c/o or needs. Primary RN notified.

## 2021-07-14 ENCOUNTER — TELEPHONE (OUTPATIENT)
Dept: ORTHOPEDIC SURGERY | Age: 36
End: 2021-07-14

## 2021-07-14 NOTE — TELEPHONE ENCOUNTER
1010 Brenda Givens called in asking about exercises that the patient can do while nonewightbearing.     Keith [facility] - [phone]

## 2021-07-15 LAB
ALBUMIN SERPL-MCNC: 3.8 G/DL
ALP BLD-CCNC: 64 U/L
ALT SERPL-CCNC: 35 U/L
ANION GAP SERPL CALCULATED.3IONS-SCNC: 1.3 MMOL/L
AST SERPL-CCNC: 17 U/L
BASOPHILS ABSOLUTE: 0 /ΜL
BASOPHILS RELATIVE PERCENT: 0.4 %
BILIRUB SERPL-MCNC: 0.1 MG/DL (ref 0.1–1.4)
BUN BLDV-MCNC: 16 MG/DL
CALCIUM SERPL-MCNC: 9 MG/DL
CHLORIDE BLD-SCNC: 104 MMOL/L
CO2: 28 MMOL/L
CREAT SERPL-MCNC: 0.8 MG/DL
EOSINOPHILS ABSOLUTE: 0.1 /ΜL
EOSINOPHILS RELATIVE PERCENT: 2.1 %
GFR CALCULATED: 116
GLUCOSE BLD-MCNC: 87 MG/DL
HCT VFR BLD CALC: 34.2 % (ref 41–53)
HEMOGLOBIN: 11.4 G/DL (ref 13.5–17.5)
LYMPHOCYTES ABSOLUTE: 2.2 /ΜL
LYMPHOCYTES RELATIVE PERCENT: 32.7 %
MCH RBC QN AUTO: 29.4 PG
MCHC RBC AUTO-ENTMCNC: 33.3 G/DL
MCV RBC AUTO: 88.1 FL
MONOCYTES ABSOLUTE: 0.6 /ΜL
MONOCYTES RELATIVE PERCENT: 9.4 %
NEUTROPHILS ABSOLUTE: 3.7 /ΜL
NEUTROPHILS RELATIVE PERCENT: 53.9 %
PDW BLD-RTO: 13.1 %
PLATELET # BLD: 431 K/ΜL
PMV BLD AUTO: 9.5 FL
POTASSIUM SERPL-SCNC: 4.5 MMOL/L
RBC # BLD: 3.88 10^6/ΜL
SEDIMENTATION RATE, ERYTHROCYTE: 20
SODIUM BLD-SCNC: 141 MMOL/L
TOTAL PROTEIN: 6.7
WBC # BLD: 6.8 10^3/ML

## 2021-07-19 LAB
ALBUMIN SERPL-MCNC: 3.9 G/DL
ALP BLD-CCNC: 64 U/L
ALT SERPL-CCNC: 18 U/L
ANION GAP SERPL CALCULATED.3IONS-SCNC: 1.5 MMOL/L
AST SERPL-CCNC: 15 U/L
BASOPHILS ABSOLUTE: 0 /ΜL
BASOPHILS RELATIVE PERCENT: 0.5 %
BILIRUB SERPL-MCNC: 0.1 MG/DL (ref 0.1–1.4)
BUN BLDV-MCNC: 13 MG/DL
C-REACTIVE PROTEIN: 0.44
CALCIUM SERPL-MCNC: 9.2 MG/DL
CHLORIDE BLD-SCNC: 102 MMOL/L
CO2: 28 MMOL/L
CREAT SERPL-MCNC: 0.7 MG/DL
EOSINOPHILS ABSOLUTE: 0.1 /ΜL
EOSINOPHILS RELATIVE PERCENT: 1.9 %
GFR CALCULATED: 122
GLUCOSE BLD-MCNC: 107 MG/DL
HCT VFR BLD CALC: 33.1 % (ref 41–53)
HEMOGLOBIN: 11.3 G/DL (ref 13.5–17.5)
LYMPHOCYTES ABSOLUTE: 1.8 /ΜL
LYMPHOCYTES RELATIVE PERCENT: 29 %
MCH RBC QN AUTO: 29.5 PG
MCHC RBC AUTO-ENTMCNC: 34.1 G/DL
MCV RBC AUTO: 86.4 FL
MONOCYTES ABSOLUTE: 0.6 /ΜL
MONOCYTES RELATIVE PERCENT: 9.8 %
NEUTROPHILS ABSOLUTE: 3.7 /ΜL
NEUTROPHILS RELATIVE PERCENT: 58.2 %
PDW BLD-RTO: 13 %
PLATELET # BLD: 336 K/ΜL
PMV BLD AUTO: 9.7 FL
POTASSIUM SERPL-SCNC: 4.3 MMOL/L
RBC # BLD: 3.83 10^6/ΜL
SEDIMENTATION RATE, ERYTHROCYTE: 23
SODIUM BLD-SCNC: 138 MMOL/L
TOTAL PROTEIN: 6.5
WBC # BLD: 6.3 10^3/ML

## 2021-07-26 ENCOUNTER — OFFICE VISIT (OUTPATIENT)
Dept: ORTHOPEDIC SURGERY | Age: 36
End: 2021-07-26

## 2021-07-26 VITALS
HEIGHT: 69 IN | HEART RATE: 100 BPM | WEIGHT: 270 LBS | OXYGEN SATURATION: 98 % | BODY MASS INDEX: 39.99 KG/M2 | RESPIRATION RATE: 16 BRPM

## 2021-07-26 DIAGNOSIS — S86.811D RUPTURE OF RIGHT PATELLAR TENDON, SUBSEQUENT ENCOUNTER: Primary | ICD-10-CM

## 2021-07-26 LAB
ALBUMIN SERPL-MCNC: 4 G/DL
ALP BLD-CCNC: 71 U/L
ALT SERPL-CCNC: 14 U/L
ANION GAP SERPL CALCULATED.3IONS-SCNC: 1.4 MMOL/L
AST SERPL-CCNC: 16 U/L
BASOPHILS ABSOLUTE: 0 /ΜL
BASOPHILS RELATIVE PERCENT: 0.5 %
BILIRUB SERPL-MCNC: 0.1 MG/DL (ref 0.1–1.4)
BUN BLDV-MCNC: 14 MG/DL
C-REACTIVE PROTEIN: 0.41
CALCIUM SERPL-MCNC: 9.3 MG/DL
CHLORIDE BLD-SCNC: 100 MMOL/L
CO2: 27 MMOL/L
CREAT SERPL-MCNC: 0.8 MG/DL
EOSINOPHILS ABSOLUTE: 0.2 /ΜL
EOSINOPHILS RELATIVE PERCENT: 3.1 %
GFR CALCULATED: 116
GLUCOSE BLD-MCNC: 112 MG/DL
HCT VFR BLD CALC: 34.4 % (ref 41–53)
HEMOGLOBIN: 11.6 G/DL (ref 13.5–17.5)
LYMPHOCYTES ABSOLUTE: 1.7 /ΜL
LYMPHOCYTES RELATIVE PERCENT: 31.1 %
MCH RBC QN AUTO: 29.4 PG
MCHC RBC AUTO-ENTMCNC: 33.7 G/DL
MCV RBC AUTO: 87.1 FL
MONOCYTES ABSOLUTE: 0.6 /ΜL
MONOCYTES RELATIVE PERCENT: 10.8 %
NEUTROPHILS ABSOLUTE: 3 /ΜL
NEUTROPHILS RELATIVE PERCENT: 54.1 %
PDW BLD-RTO: 13.2 %
PLATELET # BLD: 256 K/ΜL
PMV BLD AUTO: 10.1 FL
POTASSIUM SERPL-SCNC: 3.7 MMOL/L
RBC # BLD: 3.95 10^6/ΜL
SEDIMENTATION RATE, ERYTHROCYTE: 23
SODIUM BLD-SCNC: 137 MMOL/L
TOTAL PROTEIN: 6.8
WBC # BLD: 5.5 10^3/ML

## 2021-07-26 PROCEDURE — 99024 POSTOP FOLLOW-UP VISIT: CPT | Performed by: ORTHOPAEDIC SURGERY

## 2021-07-26 ASSESSMENT — ENCOUNTER SYMPTOMS
COLOR CHANGE: 0
CHEST TIGHTNESS: 0
BACK PAIN: 0

## 2021-07-26 NOTE — PROGRESS NOTES
Patient returns to the office for a 3 week post operative follow up on his right knee debridement I&D that was performed on 7/6/21. No pain is reported in office today with patient continuing to have nickel sized yellowish-green tinged drainage from the incision site and performing daily dressing changes. He remains ambulatory in post op ROM brace at 45 degrees and ambulatory with assistance of crutches with improved ROM and mobility of the extremity.
present. No effusion, erythema, ecchymosis, lacerations or bony tenderness. Decreased range of motion. No medial joint line, lateral joint line, MCL or LCL tenderness. No LCL laxity or MCL laxity. Normal alignment and normal patellar mobility. Left knee: Normal. No swelling, deformity, effusion, erythema, ecchymosis, lacerations or bony tenderness. Normal range of motion. No tenderness. No medial joint line, lateral joint line, MCL, LCL or patellar tendon tenderness. No LCL laxity or MCL laxity. Normal alignment and normal patellar mobility. Skin:     General: Skin is warm and dry. Capillary Refill: Capillary refill takes less than 2 seconds. Coloration: Skin is not pale. Findings: No erythema or rash. Neurological:      Mental Status: He is alert and oriented to person, place, and time. Right knee-Incision clean, dry, intact, with no erythema, no drainage, and no signs of infection. Sutures present and removed today. 0-60      Assessment:       Right knee I&D, 2 weeks  Right knee patellar tendon repair, 1 month      Plan:       I discussed with him today that he is progressing much better and his infection appears to have resolved. I did remove his sutures today. I opened up his knee brace from 0 to 60 degrees. He is to remain strictly nonweightbearing for two more weeks. In 2 weeks he can open up his knee brace from 0 to 90 degrees. In 2 weeks he can also begin weightbearing as tolerated with the knee brace locked in extension. Ice and elevate. Continue IV antibiotics. Tylenol or Motrin as needed. Follow-up in 1 month for recheck at which point we will likely discuss discontinuation of the brace and continued increased range of motion and strengthening for the right knee.             Celia 97, DO

## 2021-07-27 ENCOUNTER — CARE COORDINATION (OUTPATIENT)
Dept: CASE MANAGEMENT | Age: 36
End: 2021-07-27

## 2021-07-27 NOTE — CARE COORDINATION
[facility] Transitions Follow Up Call    2021    Patient: Maximus Fowler  Patient : 1985   MRN: [id number]  Reason for Admission: Post Op Infection, Rt Knee Cellulitis  Discharge Date: 7/10/21 RARS: Readmission Risk Score: 10    Care Transitions Subsequent and Final Call    Schedule Follow Up Appointment with PCP: Declined  Subsequent and Final Calls  Do you have any ongoing symptoms?: No  Have your medications changed?: No  Do you have any questions related to your medications?: No  Do you currently have any active services?: Yes  Are you currently active with any services?: Home Health  Do you have any needs or concerns that I can assist you with?: No  Identified Barriers: Other  Care Transitions Interventions   Home Care Waiver: Completed        Transportation Support: Declined      DME Assistance: Declined   Other Interventions: Follow Up  Future Appointments   Date Time Provider Gil Fitzpatrick   2021 10:45 AM Roslyn Díaz MD [facility]   2021 11:15 AM Jason Alcazar PA-C [facility]   10/4/2021  8:00 AM Saddie Apgar, MD [facility]   . Spoke w/ Patient for follow up call. Patient reports doing well, pain controlled, incision free of sx of infection. Had new PICC placed 21, reports no further issues, atb infuses well. Continues on iv Ancef q 8 hrs with tentative stop date of 21. Has appt w/ ID tomorrow, transportation confirmed. Was seen by Dr Lenin Jo yesterday, sutures removed and advised the following:  >PROM brace 0-60 degrees and NWB status x 2 additional weeks then WBAT and PROM brace 0-90 degrees  Patient reports adhering to guidelines and NWB. Getting around well w/ crutches. Denies additional dme needs. Aware of 21 f/u appt D Inge HAMMER. Continues active w/ [facility] for RN, PT, OT. Denies need for additional in home support or other resource needs. Advised to contact PCP/Dr White/HHC  re: any health concerns.  Agreeable to ongoing CT follow up.    Azra Hughes RN

## 2021-07-28 ENCOUNTER — OFFICE VISIT (OUTPATIENT)
Dept: INFECTIOUS DISEASES | Age: 36
End: 2021-07-28
Payer: COMMERCIAL

## 2021-07-28 VITALS
TEMPERATURE: 97 F | BODY MASS INDEX: 40.23 KG/M2 | SYSTOLIC BLOOD PRESSURE: 121 MMHG | DIASTOLIC BLOOD PRESSURE: 65 MMHG | HEART RATE: 86 BPM | RESPIRATION RATE: 18 BRPM | WEIGHT: 272.4 LBS

## 2021-07-28 DIAGNOSIS — A49.01 MSSA (METHICILLIN SUSCEPTIBLE STAPHYLOCOCCUS AUREUS) INFECTION: ICD-10-CM

## 2021-07-28 DIAGNOSIS — T81.42XD INFECTION OF DEEP INCISIONAL SURGICAL SITE AFTER PROCEDURE, SUBSEQUENT ENCOUNTER: Primary | ICD-10-CM

## 2021-07-28 DIAGNOSIS — Z79.2 RECEIVING INTRAVENOUS ANTIBIOTIC TREATMENT AS OUTPATIENT: ICD-10-CM

## 2021-07-28 PROCEDURE — 99214 OFFICE O/P EST MOD 30 MIN: CPT | Performed by: INTERNAL MEDICINE

## 2021-07-28 NOTE — PROGRESS NOTES
7/28/2021         Referring Physician: No ref. provider found  Primary Care Physician: Fidencio Almanzar MD    Impression/Plan:   Diagnosis Orders   1. Infection of deep incisional surgical site after procedure, subsequent encounter  Basic Metabolic Panel    CBC    C-Reactive Protein    Hepatic Function Panel    Sedimentation Rate   2. MSSA (methicillin susceptible Staphylococcus aureus) infection     3. Receiving intravenous antibiotic treatment as outpatient         Discussion:  Postoperative infection  Improving, stitches have been removed and the wound is no longer seeping. No pain or tenderness, CRP and ESR wnl. Complete 4 week course of cefazolin on 8/2/2021. Check labs in one month after abx completion. Return in about 5 weeks (around 9/1/2021). History: Carter Vivas is a 28 y.o.  male presenting today for follow-up. Medical history of morbid obesity, hypertension, depression. Was seen in the hospitalWith right knee pain and edema. He had a right patella tendon rupture on 6/22/2021 while he was playing baseball. He underwent a repair of the right patella tendon using them as planned 5 x 5 cm graft on 6/22/2021. His symptom started on 6/29/2021. He underwent incision and drainage of right knee with excision of dermal spine graft. Culture was positive for MSSA. He was discharged to complete a 4-week course of cefazolin on 8/2/2021. Denies nausea, vomiting, or diarrhea    Review of Systems   All other systems reviewed and are negative.       No Known Allergies    Patient Active Problem List   Diagnosis    Essential hypertension    Situational anxiety    History of attention deficit disorder    Rupture of right patellar tendon    Cellulitis of right knee    Postoperative infection    MSSA (methicillin susceptible Staphylococcus aureus) infection    Receiving intravenous antibiotic treatment as outpatient       Current Outpatient Medications   Medication Sig Dispense Refill    ceFAZolin (ANCEF) 1 g injection Infuse 2,000 mg intravenously every 8 hours for 25 days [id number] mg 0    fluocinonide (LIDEX) 0.05 % external solution apply to rash ON scalp twice a day if needed for psoriasis FLARES      sertraline (ZOLOFT) 50 MG tablet Take 1 tablet by mouth daily 90 tablet 1    lisinopril-hydroCHLOROthiazide (PRINZIDE;ZESTORETIC) 10-12.5 MG per tablet Take 1 tablet by mouth daily 30 tablet 5     No current facility-administered medications for this visit. No past medical history on file. Past Surgical History:   Procedure Laterality Date    INGUINAL HERNIA REPAIR Left 1986    LEG SURGERY Right 7/6/2021    RIGHT KNEE DEBRIDEMENT INCISION AND DRAINAGE performed by Kushal Moses DO at [facility] Right 6/22/2021    RIGHT PATELLA TENDON REPAIR performed by Kushal Moses DO at [facility] History     Socioeconomic History    Marital status:      Spouse name: Not on file    Number of children: Not on file    Years of education: Not on file    Highest education level: Not on file   Occupational History    Not on file   Tobacco Use    Smoking status: Never Smoker    Smokeless tobacco: Former User     Types: Chew   Vaping Use    Vaping Use: Never used   Substance and Sexual Activity    Alcohol use: Yes     Comment: Occasionally    Drug use: Never    Sexual activity: Yes     Partners: Female   Other Topics Concern    Not on file   Social History Narrative    Not on file     Social Determinants of Health     Financial Resource Strain: Low Risk     Difficulty of Paying Living Expenses: Not hard at all   Food Insecurity: No Food Insecurity    Worried About [address] [facility] in the Last Year: Never true    Noah of Food in the Last Year: Never true   Transportation Needs: No Transportation Needs    Lack of Transportation (Medical): No    Lack of Transportation (Non-Medical):  No   Physical Activity:     Days of Exercise per Week:     Minutes of Exercise per Session:    Stress:     Feeling of Stress :    Social Connections:     Frequency of Communication with Friends and Family:     Frequency of Social Gatherings with Friends and Family:     Attends [employer]op[employer] Services:     Active Member of Clubs or Organizations:     Attends Club or Organization Meetings:     Marital Status:    Intimate Partner Violence:     Fear of Current or Ex-Partner:     Emotionally Abused:     Physically Abused:     Sexually Abused:        Family History   Problem Relation Age of Onset    Other Mother         Adriano's    Cancer Father         Testicular       Vital Signs:  Vitals:    07/28/21 1038   BP: 121/65   Site: Right Upper Arm   Position: Sitting   Cuff Size: Large Adult   Pulse: 86   Resp: 18   Temp: 97 °F (36.1 °C)   TempSrc: Infrared   Weight: 272 lb 6.4 oz (123.6 kg)        Wt Readings from Last 3 Encounters:   07/28/21 272 lb 6.4 oz (123.6 kg)   07/26/21 270 lb (122.5 kg)   07/12/21 270 lb (122.5 kg)        Physical Exam:   Gen: alert and NAD  HEENT: sclera clear, pupils equal and reactive, extra ocular muscles intact, oropharynx clear, mucus membranes moist, tympanic membranes clear bilaterally, no cervical lymphadenopathy noted and neck supple  Neck: supple, no significant adenopathy  Chest: clear to auscultation, no wheezes, rales or rhonchi, symmetric air entry  Heart: regular rate and rhythm, no murmurs  ABD: abdomen is soft without significant tenderness, masses, organomegaly or guarding. EXT:peripheral pulses normal, no pedal edema, no clubbing or cyanosis. Left arm PICC line. NEURO: alert, oriented, normal speech, no focal findings or movement disorder noted  Skin: well hydrated, no lesions, surgical site examined  Wounds: right knee surgical scar, nil tenderness.    Labs:   WBC   Date Value Ref Range Status   07/26/2021 5.5 10^3/mL Final   07/19/2021 6.3 10^3/mL Final   07/15/2021 6.8 10^3/mL Final     CREATININE   Date

## 2021-07-28 NOTE — ASSESSMENT & PLAN NOTE
Improving, stitches have been removed and the wound is no longer seeping. No pain or tenderness, CRP and ESR wnl. Complete 4 week course of cefazolin on 8/2/2021. Check labs in one month after abx completion.

## 2021-08-02 ENCOUNTER — TELEPHONE (OUTPATIENT)
Dept: INFECTIOUS DISEASES | Age: 36
End: 2021-08-02

## 2021-08-02 NOTE — TELEPHONE ENCOUNTER
Called from [facility] states pt is finished with the ABX. Can the PICC line be pulled? Advised to maintain the line until he hears back. Please advise.

## 2021-08-09 ENCOUNTER — TELEPHONE (OUTPATIENT)
Dept: ORTHOPEDIC SURGERY | Age: 36
End: 2021-08-09

## 2021-08-09 DIAGNOSIS — Z09 POSTOP CHECK: Primary | ICD-10-CM

## 2021-08-17 ENCOUNTER — HOSPITAL ENCOUNTER (OUTPATIENT)
Dept: PHYSICAL THERAPY | Age: 36
Setting detail: THERAPIES SERIES
Discharge: HOME | End: 2021-08-17
Payer: COMMERCIAL

## 2021-08-17 PROCEDURE — 97162 PT EVAL MOD COMPLEX 30 MIN: CPT

## 2021-08-17 PROCEDURE — 97110 THERAPEUTIC EXERCISES: CPT

## 2021-08-17 NOTE — PLAN OF CARE
Outpatient Physical Therapy           [place]           [x] Phone: [phone]   Fax: [phone]  [place]           [] Phone: [phone]   Fax: [phone]     To: Referring Practitioner: Faby Ni DO  From: Ej Guzman, PT, DPT     Patient: Hasmukh Leach       : 1985  Diagnosis: Diagnosis: Postop check. R patella tendon repair. R knee I&D   Treatment Diagnosis: Treatment Diagnosis: RLE weakness, impaired gait   Date: 2021     Physical Therapy Certification/Re-Certification Form  Dear Dr. Slick Velasquez,  The following patient has been evaluated for physical therapy services and for therapy to continue, insurance requires physician review of the treatment plan initially and every 90 days. Please review the attached evaluation and/or summary of the patient's plan of care, and verify that you agree therapy should continue by signing the attached document and sending it back to our office. Assessment:    Assessment: Pt is a 42-year-old male who presents to clinic following a R patellar tendon repair on 21, as well as debridement, incision and drainage of infection on 21. Upon assessment, pt presents with impaired gait, impaired balance, impaired R knee strength and ROM and overall reduced independent with ADL/ iADL and work-related tasks. Pt would benefit from skilled therapy interventions to address listed impairments, progress toward goal completion and improve ADL/IADL status. PT also warranted to reduce risk for further injury or decline. Patient agrees with established plan of care and assisted in the development of their short term and long term goals. Patient had no adverse reaction with initial treatment and there are no barriers to learning. Demonstrates no mental or cognitive disorder.        Plan of Care/Treatment to date:  [x] Therapeutic Exercise  [x] Modalities:  [x] Therapeutic Activity     [x] Ultrasound  [x] Electrical Stimulation  [x] Gait Training      [] Cervical Traction [] Lumbar Traction  [x] Neuromuscular Re-education    [x] Cold/hotpack [] Iontophoresis   [x] Instruction in HEP      [x] Vasopneumatic    [] Dry Needling  [x] Manual Therapy               [] Aquatic Therapy       Other:          Frequency/Duration:  # Days per week: [] 1 day # Weeks: [] 1 week [x] 5 weeks     [x] 2 days   [] 2 weeks [] 6 weeks     [] 3 days   [] 3 weeks [] 7 weeks     [] 4 days   [] 4 weeks [] 8 weeks         [] 9 weeks [] 10 weeks         [] 11 weeks [] 12 weeks    Rehab Potential/Progress: [] Excellent [x] Good [] Fair  [] Poor     Goals:    Patient goals : improve mobility of right leg and get back to work  Short term goals  Time Frame for Short term goals: 5 visits  Short term goal 1: Pt will be IND with HEP in order to maximize recovery outside of clinic  Long term goals  Time Frame for Long term goals : 10 visits  Long term goal 1: Pt will improve R knee AROM from 0-115 to aide in stairs  Long term goal 2: Pt will improve gross RLE strength to 4+/5 to aide in ADLs  Long term goal 3: Pt will improve LEFS to 65 or higher to show [address] and subjective improvement  Long term goal 4: Pt will ambulate into clinic with no brace, no deviations and equal stance time      Electronically signed by:  Zafar Ledezma PT, DPT, 8/17/2021, 1:26 PM        If you have any questions or concerns, please don't hesitate to call.   Thank you for your referral.      Physician Signature:________________________________Date:_________ TIME: _____  By signing above, therapists plan is approved by physician

## 2021-08-17 NOTE — FLOWSHEET NOTE
Outpatient Physical Therapy  [place]           [x] Phone: [phone]   Fax: [phone]  [facility]           [] Phone: [phone]   Fax: [phone]        Physical Therapy Daily Treatment Note  Date:  2021    Patient Name:  Uri Rashid    :  1985  MRN: [id number]  Restrictions/Precautions:   follow protocol  Diagnosis:   Diagnosis: Postop check. R patella tendon repair. R knee I&D  Date of Injury/Surgery: 21 repair. 21 debridment. Treatment Diagnosis: Treatment Diagnosis: RLE weakness, impaired gait    Insurance/Certification information: PT Insurance Information: [employer] - 61 PCY   Referring Physician:  Referring Practitioner: Monica Kramer DO  Next Doctor Visit:    Plan of care signed (Y/N):  Sent   Outcome Measure: LEFS: 56/80  Visit# / total visits:   1/10  Pain level: 0/10     Goals:     Patient goals : improve mobility of right leg and get back to work  Short term goals  Time Frame for Short term goals: 5 visits  Short term goal 1: Pt will be IND with HEP in order to maximize recovery outside of clinic  Long term goals  Time Frame for Long term goals : 10 visits  Long term goal 1: Pt will improve R knee AROM from 0-115 to aide in stairs  Long term goal 2: Pt will improve gross RLE strength to 4+/5 to aide in ADLs  Long term goal 3: Pt will improve LEFS to 65 or higher to show [address] and subjective improvement  Long term goal 4: Pt will ambulate into clinic with no brace, no deviations and equal stance time    Summary of Evaluation: Assessment: Pt is a 70-year-old male who presents to clinic following a R patellar tendon repair on 21, as well as debridement, incision and drainage of infection on 21. Upon assessment, pt presents with impaired gait, impaired balance, impaired R knee strength and ROM and overall reduced independent with ADL/ iADL and work-related tasks.  Pt would benefit from skilled therapy interventions to address listed impairments, progress toward goal completion and improve ADL/IADL status. PT also warranted to reduce risk for further injury or decline. Subjective:  See eval         Any changes in Ambulatory Summary Sheet? None        Objective:  See eval   COVID screening questions were asked and patient attested that there had been no contact or symptoms        Exercises: (No more than 4 columns)   Exercise/Equipment 8/17/21 #1 Date Date           WARM UP                     TABLE      LAQ 2X10, 3\" eccentric focus     SLR 2x10 eccentric focus                          STANDING      STS x10 eccentric focus                                              PROPRIOCEPTION      SLS x30\"                             MODALITIES                      Other Therapeutic Activities/Education:  HEP and importance of completion, POC and goals, anatomy and physiology related to condition    Home Exercise Program: Issued, practiced and pt demo ability to perform 8/17/2021      Manual Treatments:  Scar tissue massage      Modalities:  none      Communication with other providers:  POC sent      Assessment:  Pt tolerated today's treatment without any adverse reactions or complications this date. End pain: 0/10    Assessment: Pt is a 66-year-old male who presents to clinic following a R patellar tendon repair on 6/22/21, as well as debridement, incision and drainage of infection on 7/6/21. Upon assessment, pt presents with impaired gait, impaired balance, impaired R knee strength and ROM and overall reduced independent with ADL/ IADL and work-related tasks. Pt would benefit from skilled therapy interventions to address listed impairments, progress toward goal completion and improve ADL/IADL status. PT also warranted to reduce risk for further injury or decline.       Plan for Next Session: Specific instructions for Next Treatment: gait, quad strength, protocol in chart      Time In / Time Out:    8227 - 8008    Timed Code/Total Treatment Minutes:  40': 12' TE x1, 28' Eval x1      Next Progress Note due:  10th visit      Plan of Care Interventions:  [x] Therapeutic Exercise  [x] Modalities:  [x] Therapeutic Activity     [] Ultrasound  [x] Estim  [x] Gait Training      [] Cervical Traction [] Lumbar Traction  [x] Neuromuscular Re-education    [x] Cold/hotpack [] Iontophoresis   [x] Instruction in HEP      [x] Vasopneumatic   [] Dry Needling    [x] Manual Therapy               [] Aquatic Therapy              Electronically signed by:  Joe Kapoor, PT, DPT 8/17/2021, 1:28 PM

## 2021-08-17 NOTE — PROGRESS NOTES
Physical Therapy  Initial Assessment  Date: 2021  Patient Name: Wilfred Belcher  MRN: [id number]  : 1985     Treatment Diagnosis: RLE weakness, impaired gait    Restrictions: follow protocol       Subjective   General  Chart Reviewed: Yes  Patient assessed for rehabilitation services?: Yes  Additional Pertinent Hx: HTN  Referring Practitioner: Tutu Lopez DO  Referral Date : 21  Diagnosis: Postop check. R patella tendon repair. R knee I&D  Follows Commands: Within Functional Limits  PT Visit Information  PT Insurance Information: [employer] - 60 PCY  Subjective  Subjective: Pt ruptured his R patellar tendon in  and had patellar tendon repair on 21. Pt then got an infection and had debridement incision and drainage on 21. He is now full WB with brace on that started Aug 9th. Pt does not have pain but does have stiffness in the knee joint. Pt did home PT for 3-4 weeks and has been doing that HEP since. 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 He reports no issues with ADLs. Pain Screening  Patient Currently in Pain: No  Vital Signs  Patient Currently in Pain: No    Vision/Hearing  Vision  Vision: Within Functional Limits  Hearing  Hearing: Within functional limits    Orientation  Orientation  Overall Orientation Status: Within Normal Limits    Social/Functional History  Social/Functional History  Lives With: Spouse  ADL Assistance: Independent  Homemaking Assistance: Independent  Ambulation Assistance: Independent  Transfer Assistance: Independent  Type of occupation: [employer] - currently off due to sx    Objective     Observation/Palpation  Palpation: Pt has no TTP over any structure of the R knee including bony or soft tissue  Observation: slight redness and increased swelling global R knee, some scabbing on incision.  Gait: pt arrives in extension brace on R side no AD use with reduced stance on R and reduced swing on L    AROM RLE (degrees)  RLE General AROM: knee: lacking 1 to 107 deg  Joint Mobility  ROM RLE: slight hypomobility medial/lateral patellar mobility. moderate hypomobility superior and inferior patellar mobility    Strength RLE  Strength RLE: Exception  R Hip Flexion: 4/5  R Hip ABduction: 4/5  R Hip ADduction: 4+/5  R Knee Flexion: 4/5  R Knee Extension: 4-/5  Strength LLE  Strength LLE: WFL        Sensation  Overall Sensation Status: WNL             Assessment   Conditions Requiring Skilled Therapeutic Intervention  Body structures, Functions, Activity limitations: Decreased functional mobility ; Decreased ADL status; Decreased ROM; Decreased strength;Decreased high-level IADLs;Decreased balance  Assessment: Pt is a 54-year-old male who presents to clinic following a R patellar tendon repair on 6/22/21, as well as debridement, incision and drainage of infection on 7/6/21. Upon assessment, pt presents with impaired gait, impaired balance, impaired R knee strength and ROM and overall reduced independent with ADL/ iADL and work-related tasks. Pt would benefit from skilled therapy interventions to address listed impairments, progress toward goal completion and improve ADL/IADL status. PT also warranted to reduce risk for further injury or decline. Treatment Diagnosis: RLE weakness, impaired gait  Prognosis: Good  Decision Making: Medium Complexity  History: see above. PLOF: independent  Exam: see above  Clinical Presentation: see above  Barriers to Learning: none. style: demo  REQUIRES PT FOLLOW UP: Yes  Treatment Initiated : yes on 8/17    Patient agrees with established plan of care and assisted in the development of their short term and long term goals. Patient had no adverse reaction with initial treatment and there are no barriers to learning. Demonstrates no mental or cognitive disorder.          Plan   Plan  Times per week: 2  Times per day: Daily  Plan weeks: 5  Specific instructions for Next Treatment: gait, quad strength, protocol in chart  Current Treatment Recommendations: Strengthening, Home Exercise Program, Neuromuscular Re-education, IADL Training, ROM, Manual Therapy - Soft Tissue Mobilization, Safety Education & Training, Balance Training, Patient/Caregiver Education & Training, Functional Mobility Training, Gait Training, Modalities, Pain Management, ADL/Self-care Training      OutComes Score    LEFS: 56/80    Goals  Short term goals  Time Frame for Short term goals: 5 visits  Short term goal 1: Pt will be IND with HEP in order to maximize recovery outside of clinic  Long term goals  Time Frame for Long term goals : 10 visits  Long term goal 1: Pt will improve R knee AROM from 0-115 to aide in stairs  Long term goal 2: Pt will improve gross RLE strength to 4+/5 to aide in ADLs  Long term goal 3: Pt will improve LEFS to 65 or higher to show [address] and subjective improvement  Long term goal 4: Pt will ambulate into clinic with no brace, no deviations and equal stance time  Patient Goals   Patient goals : improve mobility of right leg and get back to work       Therapy Time: 1115 - 1720 Cesar hTomas, PT, DPT

## 2021-08-21 ENCOUNTER — HOSPITAL ENCOUNTER (EMERGENCY)
Age: 36
Discharge: HOME | End: 2021-08-21
Attending: EMERGENCY MEDICINE
Payer: COMMERCIAL

## 2021-08-21 VITALS
OXYGEN SATURATION: 97 % | HEIGHT: 69 IN | HEART RATE: 79 BPM | BODY MASS INDEX: 39.99 KG/M2 | SYSTOLIC BLOOD PRESSURE: 153 MMHG | WEIGHT: 270 LBS | RESPIRATION RATE: 18 BRPM | TEMPERATURE: 97.1 F | DIASTOLIC BLOOD PRESSURE: 97 MMHG

## 2021-08-21 DIAGNOSIS — L03.115 CELLULITIS OF RIGHT KNEE: Primary | ICD-10-CM

## 2021-08-21 PROCEDURE — 99283 EMERGENCY DEPT VISIT LOW MDM: CPT

## 2021-08-21 RX ORDER — SULFAMETHOXAZOLE AND TRIMETHOPRIM 800; 160 MG/1; MG/1
1 TABLET ORAL 2 TIMES DAILY
Qty: 20 TABLET | Refills: 0 | Status: SHIPPED | OUTPATIENT
Start: 2021-08-21 | End: 2021-08-31

## 2021-08-21 ASSESSMENT — ENCOUNTER SYMPTOMS
ABDOMINAL PAIN: 0
COLOR CHANGE: 1
NAUSEA: 0
DIARRHEA: 0
COUGH: 0
SHORTNESS OF BREATH: 0
VOMITING: 0

## 2021-08-21 NOTE — ED NOTES
Discharge instructions and prescriptions were reviewed and the patient will follow up with Dr. Corinne العلي on Monday as already scheduled. He patient understands these instructions.                         Meron Santizo RN  08/21/21 7015

## 2021-08-21 NOTE — ED NOTES
The patient presents to the er today with concerns for an infection in the right knee. He reports of having knee surgery earlier this year, and the site became infected and needed a second surgery. He reports of doing well recently, is doing physical therapy. Yesterday he noticed some redness at the top of the surgical site and reports that today it is worse. He denies any pain. He is here alone, call light is within reach.                Yana Betancur RN  08/21/21 5934

## 2021-08-21 NOTE — ED PROVIDER NOTES
Radha Womack is a generally healthy 28year old male who ruptured his right patellar tendon while playing softball in June. On June 22nd he had a surgical repair by Dr. Dl Oquendo. On July 4th he was seen in the ED at [facility] for redness of the knee, and he was diagnosed with cellulitis of the incision site. He was started on Doxycycline, and followed up with Dr. Dl Oquendo. On July 6th he had a second surgical procedure for this knee and cultures were obtained at that time which showed Staph Aureus, possibly MRSA. He had a PICC line inserted and he received IV antibiotics for several weeks, during which time he was getting physical therapy at home. Last week he started outpatient therapy and has been doing well. Yesterday he noticed some redness at the superior portion of the surgical incision. This morning there is a quarter-size area of swelling and redness. He denies pain, fever, red streaks or difficulty with ambulation. He has an appointment for follow-up with Dr. Dl Oquendo on Monday. BP (!) 153/97   Pulse 79   Temp 97.1 °F (36.2 °C) (Infrared)   Resp 18   Ht 5' 9\" (1.753 m)   Wt 270 lb (122.5 kg)   SpO2 97%   BMI 39.87 kg/m²     I have reviewed the following from the nursing documentation:      Prior to Admission medications    Medication Sig Start Date End Date Taking?  Authorizing Provider   sulfamethoxazole-trimethoprim (BACTRIM DS) 800-160 MG per tablet Take 1 tablet by mouth 2 times daily for 10 days 8/21/21 8/31/21 Yes Marcos Walls MD   sertraline (ZOLOFT) 50 MG tablet Take 1 tablet by mouth daily 3/29/21 9/25/21 Yes Andreea Conner MD   lisinopril-hydroCHLOROthiazide [facility]) 10-12.5 MG per tablet Take 1 tablet by mouth daily 3/29/21 8/21/21 Yes Andreea Conner MD   fluocinonide (LIDEX) 0.05 % external solution apply to rash ON scalp twice a day if needed for psoriasis FLARES 2/16/21   Historical Provider, MD       Allergies as of 08/21/2021    (No Known Allergies)       Past Medical History:   Diagnosis Date    Hypertension         Surgical History:   Past Surgical History:   Procedure Laterality Date    INGUINAL HERNIA REPAIR Left 1986    LEG SURGERY Right 7/6/2021    RIGHT KNEE DEBRIDEMENT INCISION AND DRAINAGE performed by Maida Vaughn DO at [address] [place] [address] Right 6/22/2021    RIGHT PATELLA TENDON REPAIR performed by Maida Vaughn DO at [facility] OR        Family History:    Family History   Problem Relation Age of Onset    Other Mother         Adriano's    Cancer Father         Testicular       Social History     Socioeconomic History    Marital status:      Spouse name: Not on file    Number of children: Not on file    Years of education: Not on file    Highest education level: Not on file   Occupational History    Not on file   Tobacco Use    Smoking status: Never Smoker    Smokeless tobacco: Former User     Types: Chew   Vaping Use    Vaping Use: Never used   Substance and Sexual Activity    Alcohol use: Yes     Comment: Occasionally    Drug use: Never    Sexual activity: Yes     Partners: Female   Other Topics Concern    Not on file   Social History Narrative    Not on file     Social Determinants of Health     Financial Resource Strain: Low Risk     Difficulty of Paying Living Expenses: Not hard at all   Food Insecurity: No Food Insecurity    Worried About [facility] in the Last Year: Never true    [facility] in the Last Year: Never true   Transportation Needs: No Transportation Needs    Lack of Transportation (Medical): No    Lack of Transportation (Non-Medical):  No   Physical Activity:     Days of Exercise per Week:     Minutes of Exercise per Session:    Stress:     Feeling of Stress :    Social Connections:     Frequency of Communication with Friends and Family:     Frequency of Social Gatherings with Friends and Family:     Attends [employer] Services:     Active Member of Clubs or Organizations:     Attends Club or Organization Meetings:     Marital Status:    Intimate Partner Violence:     Fear of Current or Ex-Partner:     Emotionally Abused:     Physically Abused:     Sexually Abused:          Review of Systems   Constitutional: Negative for activity change, appetite change, chills and fever. HENT: Negative. Respiratory: Negative for cough and shortness of breath. Cardiovascular: Negative for chest pain. Gastrointestinal: Negative for abdominal pain, diarrhea, nausea and vomiting. Musculoskeletal: Negative for arthralgias and gait problem. Skin: Positive for color change. Neurological: Negative for weakness and numbness. Hematological: Negative for adenopathy. Does not bruise/bleed easily. Psychiatric/Behavioral: Negative for agitation and behavioral problems. Physical Exam  Constitutional:       Appearance: Normal appearance. He is not ill-appearing. HENT:      Head: Normocephalic and atraumatic. Eyes:      Extraocular Movements: Extraocular movements intact. Conjunctiva/sclera: Conjunctivae normal.      Pupils: Pupils are equal, round, and reactive to light. Pulmonary:      Effort: Pulmonary effort is normal. No respiratory distress. Musculoskeletal:         General: Swelling present. Comments: Exam is focused on the right knee. The surgical incision is healing adequately. There is a quarter size redness at the superior portion of the incision with minimal fluctuance; no drainage. No ascending lymphangitis. No joint effusion. Popliteal fossa benign with strong pulse. DP/PT pulses adequate. No calf pain/swelling. FROM hip and ankle, limited ROM of the right knee which is expected at this time of his recovery. Skin:     General: Skin is warm and dry. Capillary Refill: Capillary refill takes less than 2 seconds. Findings: Erythema present. Neurological:      General: No focal deficit present.       Mental Status: He is alert

## 2021-08-23 ENCOUNTER — TELEPHONE (OUTPATIENT)
Dept: ORTHOPEDIC SURGERY | Age: 36
End: 2021-08-23

## 2021-08-23 ENCOUNTER — OFFICE VISIT (OUTPATIENT)
Dept: ORTHOPEDIC SURGERY | Age: 36
End: 2021-08-23

## 2021-08-23 ENCOUNTER — HOSPITAL ENCOUNTER (OUTPATIENT)
Age: 36
Setting detail: SPECIMEN
Discharge: HOME | End: 2021-08-23
Payer: COMMERCIAL

## 2021-08-23 VITALS — WEIGHT: 270 LBS | HEART RATE: 70 BPM | BODY MASS INDEX: 39.99 KG/M2 | OXYGEN SATURATION: 98 % | HEIGHT: 69 IN

## 2021-08-23 DIAGNOSIS — Z09 POSTOP CHECK: Primary | ICD-10-CM

## 2021-08-23 LAB
SARS-COV-2: NOT DETECTED
SOURCE: NORMAL

## 2021-08-23 PROCEDURE — U0005 INFEC AGEN DETEC AMPLI PROBE: HCPCS

## 2021-08-23 PROCEDURE — U0003 INFECTIOUS AGENT DETECTION BY NUCLEIC ACID (DNA OR RNA); SEVERE ACUTE RESPIRATORY SYNDROME CORONAVIRUS 2 (SARS-COV-2) (CORONAVIRUS DISEASE [COVID-19]), AMPLIFIED PROBE TECHNIQUE, MAKING USE OF HIGH THROUGHPUT TECHNOLOGIES AS DESCRIBED BY CMS-2020-01-R: HCPCS

## 2021-08-23 PROCEDURE — 99024 POSTOP FOLLOW-UP VISIT: CPT | Performed by: ORTHOPAEDIC SURGERY

## 2021-08-23 ASSESSMENT — ENCOUNTER SYMPTOMS
COLOR CHANGE: 0
CHEST TIGHTNESS: 0
BACK PAIN: 0

## 2021-08-23 NOTE — PROGRESS NOTES
Subjective:      Patient ID: Artemio Parker is a 28 y.o. male. Pt. Here today post-op R knee debridement incision and drainage 7/6/21. Pt. Here today with abscess that came about this past Friday, was seen at ED and was started on Bactrim. Pt, does have pain/discomfort at location of abscess when moving, also warm to the touch. Pt. Current pain level is 0/10. He comes in today and is now about 2 months out from his right patellar tendon repair 6 weeks out from his I&D of the right knee. He states that overall he has been progressing very well with physical therapy and is not having any pain in the right knee however over this past weekend he developed an area of swelling along the top portion of the incision. He was seen in the ED and has kept antibiotics. Patient denies any new injury to the involved extremity/ joint, denies numbness or tingling in the involved extremity and denies fever or chills. Review of Systems   Constitutional: Negative for activity change, chills and fever. Respiratory: Negative for chest tightness. Cardiovascular: Negative for chest pain. Musculoskeletal: Positive for joint swelling. Negative for arthralgias, back pain, gait problem and myalgias. Skin: Negative for color change, pallor, rash and wound. Neurological: Negative for weakness and numbness. Past Medical History:   Diagnosis Date    Hypertension        Objective:   Physical Exam  Constitutional:       Appearance: He is well-developed. HENT:      Head: Normocephalic. Eyes:      Pupils: Pupils are equal, round, and reactive to light. Pulmonary:      Effort: Pulmonary effort is normal.   Musculoskeletal:         General: Swelling present. No tenderness, deformity or signs of injury. Normal range of motion. Cervical back: Normal range of motion. Right hip: Normal.      Left hip: Normal.      Right knee: Swelling present.  No deformity, effusion, erythema, ecchymosis, lacerations or bony tenderness. Normal range of motion. No tenderness. No medial joint line, lateral joint line, MCL or LCL tenderness. No LCL laxity or MCL laxity. Normal alignment and normal patellar mobility. Left knee: Normal. No swelling, deformity, effusion, erythema, ecchymosis, lacerations or bony tenderness. Normal range of motion. No tenderness. No medial joint line, lateral joint line, MCL, LCL or patellar tendon tenderness. No LCL laxity or MCL laxity. Normal alignment and normal patellar mobility. Skin:     General: Skin is warm and dry. Capillary Refill: Capillary refill takes less than 2 seconds. Coloration: Skin is not pale. Findings: No erythema or rash. Neurological:      Mental Status: He is alert and oriented to person, place, and time. Right knee-incision well-healed, there is a 1 cm x 2 cm fluid collection along the proximal aspect of the incision which is mildly erythematous and mildly tender to palpation. No pain with range of motion of the right knee. 0-130      Assessment:       Right knee I&D, 6 weeks   Right knee patellar tendon repair, 2 months      Plan:       I discussed with him today that he does have a recurrence of a small abscess at the proximal aspect of the incision. I also explained to him the appears to have 2 retained sutures. At this point I recommend additional surgical treatment. I discussed with him today performing incision and drainage of his right knee with removal of the foreign body of the retained sutures. I explained risks, benefits, possible complications of the procedure and answered all questions for the patient. I explained postoperative rehabilitation protocol and expectations with the patient today. The patient understands and consents to the procedure. Patient will follow up with their primary care physician prior to surgical treatment for preoperative clearance. We will schedule surgery at soonest convenience.   Continue weight-bearing as tolerated. Continue range of motion exercises as instructed. Ice and elevate as needed. Tylenol or Motrin for pain. Follow up in 2 weeks postop. We will plan to proceed with surgical treatment on Wednesday.               Celia Ballesteros, DO

## 2021-08-23 NOTE — PROGRESS NOTES
Pt. Here today post-op R knee debridement incision and drainage 7/6/21. Pt. Here today with abscess that came about this past Friday, was seen at ED and was started on Bactrim. Pt, does have pain/discomfort at location of abscess when moving, also warm to the touch. Pt. Current pain level is 0/10.

## 2021-08-23 NOTE — TELEPHONE ENCOUNTER
Call patient's insurance spoke May C no prior Geroldine [place] is needed for cpt code [phone].   Ref# [id number]

## 2021-08-23 NOTE — PROGRESS NOTES
Surgery 8/25/21   you will be called  8/24/21    with times  Left message on pt's voicemailbox               1. Do not eat or drink anything after midnight - unless instructed by your doctor prior to surgery. This includes                   no water, chewing gum or mints. 2. Follow your directions as prescribed by the doctor for your procedure and medications. 3. Check with your Doctor regarding stopping Plavix, Coumadin, Lovenox,Effient,Pradaxa,Xarelto, Fragmin or other blood thinners and                   follow their instructions. Do not take any medications the morning of surgery. 4. Do not smoke, and do not drink any alcoholic beverages 24 hours prior to surgery. This includes NA Beer. 5. You may brush your teeth and gargle the morning of surgery. DO NOT SWALLOW WATER   6. You MUST make arrangements for a responsible adult to take you home after your surgery and be able to check on you every couple                   hours for the day. You will not be allowed to leave alone or drive yourself home. It is strongly suggested someone stay with you the first 24                   hrs. Your surgery will be cancelled if you do not have a ride home. 7. Please wear simple, loose fitting clothing to the hospital.  Carmen Shingles not bring valuables (money, credit cards, checkbooks, etc.) Do not wear any                   makeup (including no eye makeup) or nail polish on your fingers or toes. 8. DO NOT wear any jewelry or piercings on day of surgery. All body piercing jewelry must be removed. 9. If you have dentures, they will be removed before going to the OR; we will provide you a container. If you wear contact lenses or glasses,                  they will be removed; please bring a case for them. 10. If you  have a Living Will and Durable Power of  for Healthcare, please bring in a copy.            11. Please bring picture ID,  insurance card, paperwork from the doctors office    (H & P, Consent, & card for implantable devices). 12. Take a shower the night before or morning of your procedure, do not apply any lotion, oil or powder. 13. Wear a mask covering your nose & mouth when entering the hospital. Have your covid-19 test performed within 10 days of your                  surgery. Quarantine yourself after the test until after your surgery. Pt. Has received a covid test on 8/23/21 and results are pending.

## 2021-08-24 ENCOUNTER — ANESTHESIA EVENT (OUTPATIENT)
Dept: OPERATING ROOM | Age: 36
End: 2021-08-24
Payer: COMMERCIAL

## 2021-08-24 NOTE — PROGRESS NOTES
Called patient and left message confirming surgery appointment tomorrow 8/25/21at  8:30 with an arrival time of 6:00. Patient instructed nothing to eat or drink after midnight. I left the PAT phone number with encouragement to call with any questions.

## 2021-08-24 NOTE — ANESTHESIA PRE PROCEDURE
Department of Anesthesiology  Preprocedure Note       Name:  Helio Lara   Age:  28 y.o.  :  1985                                          MRN:  [id number]         Date:  2021      Surgeon: Joy Edmond):  Kristin Logan DO    Procedure: Procedure(s):  RIGHT KNEE INCISION AND DRAINAGE    Medications prior to admission:   Prior to Admission medications    Medication Sig Start Date End Date Taking? Authorizing Provider   sulfamethoxazole-trimethoprim (BACTRIM DS) 800-160 MG per tablet Take 1 tablet by mouth 2 times daily for 10 days 21  Jak Graves MD   fluocinonide (LIDEX) 0.05 % external solution apply to rash ON scalp twice a day if needed for psoriasis FLARES 21   Historical Provider, MD   sertraline (ZOLOFT) 50 MG tablet Take 1 tablet by mouth daily 3/29/21 9/25/21  Erasmo Finley MD   lisinopril-hydroCHLOROthiazide [facility]) 10-12.5 MG per tablet Take 1 tablet by mouth daily 3/29/21 8/21/21  Erasmo Finley MD       Current medications:    Current Outpatient Medications   Medication Sig Dispense Refill    sulfamethoxazole-trimethoprim (BACTRIM DS) 800-160 MG per tablet Take 1 tablet by mouth 2 times daily for 10 days 20 tablet 0    fluocinonide (LIDEX) 0.05 % external solution apply to rash ON scalp twice a day if needed for psoriasis FLARES      sertraline (ZOLOFT) 50 MG tablet Take 1 tablet by mouth daily 90 tablet 1    lisinopril-hydroCHLOROthiazide (PRINZIDE;ZESTORETIC) 10-12.5 MG per tablet Take 1 tablet by mouth daily 30 tablet 5     No current facility-administered medications for this visit.        Allergies:  No Known Allergies    Problem List:    Patient Active Problem List   Diagnosis Code    Essential hypertension I10    Situational anxiety F41.8    History of attention deficit disorder Z86.59    Rupture of right patellar tendon B68.581X    Cellulitis of right knee L03.115    Postoperative infection T81.40XA    MSSA (methicillin susceptible Staphylococcus aureus) infection A49.01    Receiving intravenous antibiotic treatment as outpatient Z79.2       Past Medical History:        Diagnosis Date    Hypertension        Past Surgical History:        Procedure Laterality Date    INGUINAL HERNIA REPAIR Left 1986    LEG SURGERY Right 7/6/2021    RIGHT KNEE DEBRIDEMENT INCISION AND DRAINAGE performed by Lea Sheikh DO at [facility] Right 6/22/2021    RIGHT PATELLA TENDON REPAIR performed by Lea Sheikh DO at [facility] History:    Social History     Tobacco Use    Smoking status: Never Smoker    Smokeless tobacco: Former User     Types: Chew   Substance Use Topics    Alcohol use: Yes     Comment: Occasionally                                Counseling given: Not Answered      Vital Signs (Current): There were no vitals filed for this visit.                                            BP Readings from Last 3 Encounters:   08/21/21 (!) 153/97   07/28/21 121/65   07/10/21 120/75       NPO Status:                                                                                 BMI:   Wt Readings from Last 3 Encounters:   08/23/21 270 lb (122.5 kg)   08/21/21 270 lb (122.5 kg)   07/28/21 272 lb 6.4 oz (123.6 kg)     There is no height or weight on file to calculate BMI.    CBC:   Lab Results   Component Value Date    WBC 5.5 07/26/2021    RBC 3.95 07/26/2021    HGB 11.6 07/26/2021    HCT 34.4 07/26/2021    MCV 87.1 07/26/2021    RDW 13.2 07/26/2021     07/26/2021       CMP:   Lab Results   Component Value Date     07/26/2021    K 3.7 07/26/2021     07/26/2021    CO2 27 07/26/2021    BUN 14 07/26/2021    CREATININE 0.8 07/26/2021    GFRAA >60 07/08/2021    AGRATIO 1.5 09/28/2020    LABGLOM 116 07/26/2021    LABGLOM >60 07/08/2021    GLUCOSE 112 07/26/2021    PROT 7.0 07/04/2021    CALCIUM 9.3 07/26/2021    BILITOT 0.1 07/26/2021    ALKPHOS 71 07/26/2021    AST 16 07/26/2021 ALT 14 07/26/2021       POC Tests: No results for input(s): POCGLU, POCNA, POCK, POCCL, POCBUN, POCHEMO, POCHCT in the last 72 hours. Coags: No results found for: PROTIME, INR, APTT    HCG (If Applicable): No results found for: PREGTESTUR, PREGSERUM, HCG, HCGQUANT     ABGs: No results found for: PHART, PO2ART, ALK3LXR, UUR4DDC, BEART, G0MYIECK     Type & Screen (If Applicable):  No results found for: LABABO, LABRH    Drug/Infectious Status (If Applicable):  No results found for: HIV, HEPCAB    COVID-19 Screening (If Applicable):   Lab Results   Component Value Date    COVID19 NOT DETECTED 08/23/2021           Anesthesia Evaluation  Patient summary reviewed no history of anesthetic complications:   Airway: Mallampati: II  TM distance: >3 FB   Neck ROM: full  Comment: Large head and neck  Mouth opening: > = 3 FB Dental: normal exam         Pulmonary:Negative Pulmonary ROS breath sounds clear to auscultation                             Cardiovascular:  Exercise tolerance: good (>4 METS),   (+) hypertension: mild,         Rhythm: regular  Rate: normal                    Neuro/Psych:   (+) psychiatric history (ADHD):             ROS comment: adhd GI/Hepatic/Renal:   (+) morbid obesity (BMI 40)          Endo/Other: Negative Endo/Other ROS             Pt had no PAT visit       Abdominal:   (+) obese,           Vascular: negative vascular ROS. Other Findings:             Anesthesia Plan      general     ASA 2       Induction: intravenous. MIPS: Postoperative opioids intended. Anesthetic plan and risks discussed with patient. Plan discussed with CRNA.     Attending anesthesiologist reviewed and agrees with Preprocedure content            YOSELIN Calvillo - OC   8/24/2021

## 2021-08-25 ENCOUNTER — HOSPITAL ENCOUNTER (OUTPATIENT)
Age: 36
Setting detail: OUTPATIENT SURGERY
Discharge: HOME | End: 2021-08-25
Attending: ORTHOPAEDIC SURGERY | Admitting: ORTHOPAEDIC SURGERY
Payer: COMMERCIAL

## 2021-08-25 ENCOUNTER — ANESTHESIA (OUTPATIENT)
Dept: OPERATING ROOM | Age: 36
End: 2021-08-25
Payer: COMMERCIAL

## 2021-08-25 VITALS
TEMPERATURE: 97.7 F | RESPIRATION RATE: 5 BRPM | SYSTOLIC BLOOD PRESSURE: 107 MMHG | DIASTOLIC BLOOD PRESSURE: 55 MMHG | OXYGEN SATURATION: 97 %

## 2021-08-25 VITALS
HEIGHT: 69 IN | BODY MASS INDEX: 39.99 KG/M2 | DIASTOLIC BLOOD PRESSURE: 52 MMHG | WEIGHT: 270 LBS | TEMPERATURE: 97.8 F | SYSTOLIC BLOOD PRESSURE: 112 MMHG | HEART RATE: 84 BPM | RESPIRATION RATE: 16 BRPM | OXYGEN SATURATION: 96 %

## 2021-08-25 DIAGNOSIS — M71.061 ABSCESS OF BURSA OF RIGHT KNEE: ICD-10-CM

## 2021-08-25 DIAGNOSIS — T81.40XA POSTOPERATIVE INFECTION, UNSPECIFIED TYPE, INITIAL ENCOUNTER: Primary | ICD-10-CM

## 2021-08-25 PROCEDURE — 6360000002 HC RX W HCPCS: Performed by: ORTHOPAEDIC SURGERY

## 2021-08-25 PROCEDURE — 3600000012 HC SURGERY LEVEL 2 ADDTL 15MIN: Performed by: ORTHOPAEDIC SURGERY

## 2021-08-25 PROCEDURE — 6360000002 HC RX W HCPCS: Performed by: NURSE ANESTHETIST, CERTIFIED REGISTERED

## 2021-08-25 PROCEDURE — 87205 SMEAR GRAM STAIN: CPT

## 2021-08-25 PROCEDURE — 2580000003 HC RX 258: Performed by: ORTHOPAEDIC SURGERY

## 2021-08-25 PROCEDURE — 3700000000 HC ANESTHESIA ATTENDED CARE: Performed by: ORTHOPAEDIC SURGERY

## 2021-08-25 PROCEDURE — 7100000010 HC PHASE II RECOVERY - FIRST 15 MIN: Performed by: ORTHOPAEDIC SURGERY

## 2021-08-25 PROCEDURE — 87070 CULTURE OTHR SPECIMN AEROBIC: CPT

## 2021-08-25 PROCEDURE — 2709999900 HC NON-CHARGEABLE SUPPLY: Performed by: ORTHOPAEDIC SURGERY

## 2021-08-25 PROCEDURE — 7100000011 HC PHASE II RECOVERY - ADDTL 15 MIN: Performed by: ORTHOPAEDIC SURGERY

## 2021-08-25 PROCEDURE — 3700000001 HC ADD 15 MINUTES (ANESTHESIA): Performed by: ORTHOPAEDIC SURGERY

## 2021-08-25 PROCEDURE — 2580000003 HC RX 258: Performed by: ANESTHESIOLOGY

## 2021-08-25 PROCEDURE — 7100000000 HC PACU RECOVERY - FIRST 15 MIN: Performed by: ORTHOPAEDIC SURGERY

## 2021-08-25 PROCEDURE — 87077 CULTURE AEROBIC IDENTIFY: CPT

## 2021-08-25 PROCEDURE — 3600000002 HC SURGERY LEVEL 2 BASE: Performed by: ORTHOPAEDIC SURGERY

## 2021-08-25 PROCEDURE — 6360000002 HC RX W HCPCS

## 2021-08-25 PROCEDURE — 27310 EXPLORATION OF KNEE JOINT: CPT | Performed by: ORTHOPAEDIC SURGERY

## 2021-08-25 PROCEDURE — 87075 CULTR BACTERIA EXCEPT BLOOD: CPT

## 2021-08-25 PROCEDURE — 7100000001 HC PACU RECOVERY - ADDTL 15 MIN: Performed by: ORTHOPAEDIC SURGERY

## 2021-08-25 PROCEDURE — 87186 SC STD MICRODIL/AGAR DIL: CPT

## 2021-08-25 RX ORDER — FENTANYL CITRATE 50 UG/ML
50 INJECTION, SOLUTION INTRAMUSCULAR; INTRAVENOUS EVERY 5 MIN PRN
Status: DISCONTINUED | OUTPATIENT
Start: 2021-08-25 | End: 2021-08-25 | Stop reason: HOSPADM

## 2021-08-25 RX ORDER — SODIUM CHLORIDE, SODIUM LACTATE, POTASSIUM CHLORIDE, CALCIUM CHLORIDE 600; 310; 30; 20 MG/100ML; MG/100ML; MG/100ML; MG/100ML
INJECTION, SOLUTION INTRAVENOUS CONTINUOUS
Status: CANCELLED | OUTPATIENT
Start: 2021-08-25

## 2021-08-25 RX ORDER — CEFAZOLIN SODIUM 2 G/50ML
2000 SOLUTION INTRAVENOUS ONCE
Status: DISCONTINUED | OUTPATIENT
Start: 2021-08-25 | End: 2021-08-25 | Stop reason: SDUPTHER

## 2021-08-25 RX ORDER — PROPOFOL 10 MG/ML
INJECTION, EMULSION INTRAVENOUS PRN
Status: DISCONTINUED | OUTPATIENT
Start: 2021-08-25 | End: 2021-08-25 | Stop reason: SDUPTHER

## 2021-08-25 RX ORDER — ONDANSETRON 2 MG/ML
4 INJECTION INTRAMUSCULAR; INTRAVENOUS EVERY 6 HOURS PRN
Status: CANCELLED | OUTPATIENT
Start: 2021-08-25

## 2021-08-25 RX ORDER — FENTANYL CITRATE 50 UG/ML
INJECTION, SOLUTION INTRAMUSCULAR; INTRAVENOUS PRN
Status: DISCONTINUED | OUTPATIENT
Start: 2021-08-25 | End: 2021-08-25 | Stop reason: SDUPTHER

## 2021-08-25 RX ORDER — ONDANSETRON 2 MG/ML
4 INJECTION INTRAMUSCULAR; INTRAVENOUS
Status: DISCONTINUED | OUTPATIENT
Start: 2021-08-25 | End: 2021-08-25 | Stop reason: HOSPADM

## 2021-08-25 RX ORDER — KETOROLAC TROMETHAMINE 30 MG/ML
INJECTION, SOLUTION INTRAMUSCULAR; INTRAVENOUS PRN
Status: DISCONTINUED | OUTPATIENT
Start: 2021-08-25 | End: 2021-08-25 | Stop reason: SDUPTHER

## 2021-08-25 RX ORDER — MIDAZOLAM HYDROCHLORIDE 1 MG/ML
INJECTION INTRAMUSCULAR; INTRAVENOUS PRN
Status: DISCONTINUED | OUTPATIENT
Start: 2021-08-25 | End: 2021-08-25 | Stop reason: SDUPTHER

## 2021-08-25 RX ORDER — FENTANYL CITRATE 50 UG/ML
25 INJECTION, SOLUTION INTRAMUSCULAR; INTRAVENOUS EVERY 5 MIN PRN
Status: DISCONTINUED | OUTPATIENT
Start: 2021-08-25 | End: 2021-08-25 | Stop reason: HOSPADM

## 2021-08-25 RX ORDER — OXYCODONE HYDROCHLORIDE 5 MG/1
10 TABLET ORAL EVERY 4 HOURS PRN
Status: CANCELLED | OUTPATIENT
Start: 2021-08-25

## 2021-08-25 RX ORDER — OXYCODONE HYDROCHLORIDE 5 MG/1
5 TABLET ORAL EVERY 4 HOURS PRN
Status: CANCELLED | OUTPATIENT
Start: 2021-08-25

## 2021-08-25 RX ORDER — CEFAZOLIN SODIUM 2 G/100ML
2000 INJECTION, SOLUTION INTRAVENOUS ONCE
Status: COMPLETED | OUTPATIENT
Start: 2021-08-25 | End: 2021-08-25

## 2021-08-25 RX ORDER — SODIUM CHLORIDE, SODIUM LACTATE, POTASSIUM CHLORIDE, CALCIUM CHLORIDE 600; 310; 30; 20 MG/100ML; MG/100ML; MG/100ML; MG/100ML
INJECTION, SOLUTION INTRAVENOUS CONTINUOUS
Status: DISCONTINUED | OUTPATIENT
Start: 2021-08-25 | End: 2021-08-25 | Stop reason: HOSPADM

## 2021-08-25 RX ORDER — SODIUM CHLORIDE 9 MG/ML
25 INJECTION, SOLUTION INTRAVENOUS PRN
Status: CANCELLED | OUTPATIENT
Start: 2021-08-25

## 2021-08-25 RX ORDER — SODIUM CHLORIDE 0.9 % (FLUSH) 0.9 %
10 SYRINGE (ML) INJECTION EVERY 12 HOURS SCHEDULED
Status: CANCELLED | OUTPATIENT
Start: 2021-08-25

## 2021-08-25 RX ORDER — SODIUM CHLORIDE 0.9 % (FLUSH) 0.9 %
10 SYRINGE (ML) INJECTION PRN
Status: CANCELLED | OUTPATIENT
Start: 2021-08-25

## 2021-08-25 RX ORDER — HYDROCODONE BITARTRATE AND ACETAMINOPHEN 5; 325 MG/1; MG/1
1 TABLET ORAL EVERY 4 HOURS PRN
Qty: 10 TABLET | Refills: 0 | Status: SHIPPED | OUTPATIENT
Start: 2021-08-25 | End: 2021-09-01

## 2021-08-25 RX ORDER — ONDANSETRON 2 MG/ML
INJECTION INTRAMUSCULAR; INTRAVENOUS PRN
Status: DISCONTINUED | OUTPATIENT
Start: 2021-08-25 | End: 2021-08-25 | Stop reason: SDUPTHER

## 2021-08-25 RX ORDER — HYDRALAZINE HYDROCHLORIDE 20 MG/ML
5 INJECTION INTRAMUSCULAR; INTRAVENOUS EVERY 10 MIN PRN
Status: DISCONTINUED | OUTPATIENT
Start: 2021-08-25 | End: 2021-08-25 | Stop reason: HOSPADM

## 2021-08-25 RX ORDER — LIDOCAINE HYDROCHLORIDE 20 MG/ML
INJECTION, SOLUTION INTRAVENOUS PRN
Status: DISCONTINUED | OUTPATIENT
Start: 2021-08-25 | End: 2021-08-25 | Stop reason: SDUPTHER

## 2021-08-25 RX ORDER — CEPHALEXIN 250 MG/1
500 CAPSULE ORAL 4 TIMES DAILY
Qty: 80 CAPSULE | Refills: 0 | Status: SHIPPED | OUTPATIENT
Start: 2021-08-25 | End: 2021-09-01 | Stop reason: SDUPTHER

## 2021-08-25 RX ORDER — LABETALOL HYDROCHLORIDE 5 MG/ML
5 INJECTION, SOLUTION INTRAVENOUS EVERY 10 MIN PRN
Status: DISCONTINUED | OUTPATIENT
Start: 2021-08-25 | End: 2021-08-25 | Stop reason: HOSPADM

## 2021-08-25 RX ORDER — ONDANSETRON 4 MG/1
4 TABLET, ORALLY DISINTEGRATING ORAL EVERY 8 HOURS PRN
Status: CANCELLED | OUTPATIENT
Start: 2021-08-25

## 2021-08-25 RX ADMIN — CEFAZOLIN SODIUM 2000 MG: 2 INJECTION, SOLUTION INTRAVENOUS at 08:48

## 2021-08-25 RX ADMIN — FENTANYL CITRATE 100 MCG: 50 INJECTION, SOLUTION INTRAMUSCULAR; INTRAVENOUS at 08:50

## 2021-08-25 RX ADMIN — LIDOCAINE HYDROCHLORIDE 100 MG: 20 INJECTION, SOLUTION INTRAVENOUS at 08:50

## 2021-08-25 RX ADMIN — KETOROLAC TROMETHAMINE 15 MG: 30 INJECTION, SOLUTION INTRAMUSCULAR; INTRAVENOUS at 09:11

## 2021-08-25 RX ADMIN — PROPOFOL 180 MG: 10 INJECTION, EMULSION INTRAVENOUS at 08:50

## 2021-08-25 RX ADMIN — ONDANSETRON 4 MG: 2 INJECTION INTRAMUSCULAR; INTRAVENOUS at 08:50

## 2021-08-25 RX ADMIN — SODIUM CHLORIDE, POTASSIUM CHLORIDE, SODIUM LACTATE AND CALCIUM CHLORIDE: 600; 310; 30; 20 INJECTION, SOLUTION INTRAVENOUS at 06:32

## 2021-08-25 RX ADMIN — MIDAZOLAM 2 MG: 1 INJECTION INTRAMUSCULAR; INTRAVENOUS at 08:45

## 2021-08-25 ASSESSMENT — PULMONARY FUNCTION TESTS
PIF_VALUE: 14
PIF_VALUE: 13
PIF_VALUE: 13
PIF_VALUE: 15
PIF_VALUE: 15
PIF_VALUE: 14
PIF_VALUE: 1
PIF_VALUE: 3
PIF_VALUE: 38
PIF_VALUE: 1
PIF_VALUE: 14
PIF_VALUE: 13
PIF_VALUE: 14
PIF_VALUE: 13
PIF_VALUE: 0
PIF_VALUE: 2
PIF_VALUE: 14
PIF_VALUE: 13
PIF_VALUE: 13
PIF_VALUE: 14
PIF_VALUE: 25
PIF_VALUE: 13
PIF_VALUE: 3
PIF_VALUE: 15
PIF_VALUE: 14
PIF_VALUE: 21
PIF_VALUE: 15
PIF_VALUE: 16
PIF_VALUE: 14
PIF_VALUE: 6
PIF_VALUE: 13
PIF_VALUE: 8
PIF_VALUE: 6
PIF_VALUE: 14
PIF_VALUE: 13

## 2021-08-25 ASSESSMENT — PAIN DESCRIPTION - LOCATION: LOCATION: KNEE

## 2021-08-25 ASSESSMENT — PAIN SCALES - GENERAL
PAINLEVEL_OUTOF10: 0
PAINLEVEL_OUTOF10: 2
PAINLEVEL_OUTOF10: 0
PAINLEVEL_OUTOF10: 2
PAINLEVEL_OUTOF10: 0

## 2021-08-25 ASSESSMENT — PAIN DESCRIPTION - PAIN TYPE
TYPE: SURGICAL PAIN
TYPE: SURGICAL PAIN

## 2021-08-25 ASSESSMENT — PAIN DESCRIPTION - ORIENTATION
ORIENTATION: LEFT
ORIENTATION: LEFT

## 2021-08-25 ASSESSMENT — PAIN - FUNCTIONAL ASSESSMENT: PAIN_FUNCTIONAL_ASSESSMENT: 0-10

## 2021-08-25 NOTE — PROGRESS NOTES
0930 pt arrived to PACU, [facility] Valentine Shook CRNA. Vitals obtained, monitors applied and alarms on. 0932 Surgical site dressing remains C/D/I. 6430 Patient tolerating ice chips appropriately. 1010 Bedside report given to Lottie Enriquez RN.

## 2021-08-25 NOTE — H&P
Subjective:      Patient ID: Artemio Parker is a 28 y.o. male.     Pt. Here today post-op R knee debridement incision and drainage 7/6/21. Pt. Here today with abscess that came about this past Friday, was seen at ED and was started on Bactrim. Pt, does have pain/discomfort at location of abscess when moving, also warm to the touch. Pt. Current pain level is 0/10.     He comes in today and is now about 2 months out from his right patellar tendon repair 6 weeks out from his I&D of the right knee. He states that overall he has been progressing very well with physical therapy and is not having any pain in the right knee however over this past weekend he developed an area of swelling along the top portion of the incision. He was seen in the ED and has kept antibiotics. Patient denies any new injury to the involved extremity/ joint, denies numbness or tingling in the involved extremity and denies fever or chills.                    Review of Systems   Constitutional: Negative for activity change, chills and fever. Respiratory: Negative for chest tightness. Cardiovascular: Negative for chest pain. Musculoskeletal: Positive for joint swelling. Negative for arthralgias, back pain, gait problem and myalgias. Skin: Negative for color change, pallor, rash and wound. Neurological: Negative for weakness and numbness.         Past Medical History   Past Medical History:   Diagnosis Date    Hypertension              No current facility-administered medications on file prior to encounter.      Current Outpatient Medications on File Prior to Encounter   Medication Sig Dispense Refill    sulfamethoxazole-trimethoprim (BACTRIM DS) 800-160 MG per tablet Take 1 tablet by mouth 2 times daily for 10 days 20 tablet 0    fluocinonide (LIDEX) 0.05 % external solution apply to rash ON scalp twice a day if needed for psoriasis FLARES      sertraline (ZOLOFT) 50 MG tablet Take 1 tablet by mouth daily 90 tablet 1    lisinopril-hydroCHLOROthiazide (PRINZIDE;ZESTORETIC) 10-12.5 MG per tablet Take 1 tablet by mouth daily 30 tablet 5     No Known Allergies  Past Surgical History:   Procedure Laterality Date    INGUINAL HERNIA REPAIR Left 1986    LEG SURGERY Right 7/6/2021    RIGHT KNEE DEBRIDEMENT INCISION AND DRAINAGE performed by Kaci Marrero DO at [facility] Right 6/22/2021    RIGHT PATELLA TENDON REPAIR performed by Kaci Marrero DO at [facility] History     Tobacco Use    Smoking status: Never Smoker    Smokeless tobacco: Former User     Types: Chew   Vaping Use    Vaping Use: Never used   Substance Use Topics    Alcohol use: Yes     Comment: Occasionally    Drug use: Never     Family History   Problem Relation Age of Onset    Other Mother         Adriano's    Cancer Father         Testicular       Objective:   Physical Exam  Constitutional:       Appearance: He is well-developed. HENT:      Head: Normocephalic. Eyes:      Pupils: Pupils are equal, round, and reactive to light. Pulmonary:      Effort: Pulmonary effort is normal.   Musculoskeletal:         General: Swelling present. No tenderness, deformity or signs of injury. Normal range of motion. Cervical back: Normal range of motion. Right hip: Normal.      Left hip: Normal.      Right knee: Swelling present. No deformity, effusion, erythema, ecchymosis, lacerations or bony tenderness. Normal range of motion. No tenderness. No medial joint line, lateral joint line, MCL or LCL tenderness. No LCL laxity or MCL laxity. Normal alignment and normal patellar mobility. Left knee: Normal. No swelling, deformity, effusion, erythema, ecchymosis, lacerations or bony tenderness. Normal range of motion. No tenderness. No medial joint line, lateral joint line, MCL, LCL or patellar tendon tenderness. No LCL laxity or MCL laxity. Normal alignment and normal patellar mobility. Skin:     General: Skin is warm and dry. Capillary Refill: Capillary refill takes less than 2 seconds. Coloration: Skin is not pale. Findings: No erythema or rash. Neurological:      Mental Status: He is alert and oriented to person, place, and time. Right knee-incision well-healed, there is a 1 cm x 2 cm fluid collection along the proximal aspect of the incision which is mildly erythematous and mildly tender to palpation. No pain with range of motion of the right knee. 0-130      /72   Pulse 96   Temp 96.1 °F (35.6 °C) (Temporal)   Resp 16   Ht 5' 9\" (1.753 m)   Wt 270 lb (122.5 kg)   SpO2 95%   BMI 39.87 kg/m²     Assessment:       Right knee I&D, 6 weeks   Right knee patellar tendon repair, 2 months                Plan:       I discussed with him today that he does have a recurrence of a small abscess at the proximal aspect of the incision. I also explained to him the appears to have 2 retained sutures. At this point I recommend additional surgical treatment. I discussed with him today performing incision and drainage of his right knee with removal of the foreign body of the retained sutures. I explained risks, benefits, possible complications of the procedure and answered all questions for the patient. I explained postoperative rehabilitation protocol and expectations with the patient today. The patient understands and consents to the procedure. Patient will follow up with their primary care physician prior to surgical treatment for preoperative clearance. We will schedule surgery at soonest convenience. Continue weight-bearing as tolerated. Continue range of motion exercises as instructed. Ice and elevate as needed. Tylenol or Motrin for pain. Follow up in 2 weeks postop. We will plan to proceed with surgical treatment on Wednesday.      The patient was counseled at length about the risks of may Covid-19 during their perioperative period and any recovery window from their procedure.   The patient was made aware that may Covid-19  may worsen their prognosis for recovering from their procedure  and lend to a higher morbidity and/or mortality risk. All material risks, benefits, and reasonable alternatives including postponing the procedure were discussed. The patient does wish to proceed with the procedure at this time.       Pt seen and examined, No change in H+P.                        HAL JOYCE, DO

## 2021-08-25 NOTE — PROGRESS NOTES
200- Patientr returned to [facility], Report given to this nurse from [facility]. Patient is A&O able to make needs known, sitting up in bed drinking water wife at bedside. 1030- Discharge instructions given to patient and spouse both verbalized understanding. IV removed. 1039- Patient sitting up on side of bed getting dressed wife at bedside. 1045- Patient escorted to car via w/c where wife is transporting home.

## 2021-08-25 NOTE — ANESTHESIA POSTPROCEDURE EVALUATION
Department of Anesthesiology  Postprocedure Note    Patient: Mimi Sotelo  MRN: [id number]  [place]: 1985  Date of evaluation: 8/25/2021  Time:  5:25 PM     Procedure Summary     Date: 08/25/21 Room / Location: [address] [place] [address]    Anesthesia Start: [address] Anesthesia Stop: 6172    Procedure: RIGHT KNEE INCISION AND DRAINAGE (Right Knee) Diagnosis:       Abscess of bursa of right knee      (Abscess of bursa of right knee [M71.061])    Surgeons: Kaci Marrero DO Responsible Provider: Damion Robledo MD    Anesthesia Type: general ASA Status: 2          Anesthesia Type: general    Freeman Phase I: Freeman Score: 10    Freeman Phase II: Freeman Score: 10    Last vitals: Reviewed and per EMR flowsheets.        Anesthesia Post Evaluation    Patient location during evaluation: PACU  Patient participation: complete - patient participated  Level of consciousness: sleepy but conscious  Pain score: 2  Airway patency: patent  Nausea & Vomiting: no nausea and no vomiting  Complications: no  Cardiovascular status: hemodynamically stable  Respiratory status: acceptable  Hydration status: euvolemic

## 2021-08-25 NOTE — OP NOTE
Operative Note      DATE OF PROCEDURE:   8/25/2021     PREOPERATIVE DIAGNOSIS:  Right  prepatellar abscess     POSTOPERATIVE DIAGNOSIS:  Right  prepatellar abscess with retained foreign body. OPERATION PERFORMED:    1. Incision and drainage right prepatellar abscess. 2.  Removal foreign body right knee, X2      SURGEON:  Maida Vaughn DO    ANESTHESIA:  General.     ESTIMATED BLOOD LOSS:   20 mL. FLUIDS:   700 mL of crystalloids. SPECIMEN: Superficial culture right knee     INDICATION FOR PROCEDURE:  The patient is a 60-year-old male who previously underwent repair of a right patellar tendon. Postoperatively he had a secondary injury after his dog scratches incision and he developed an abscess in his previous surgical site. He underwent I&D of the right knee and was progressing well however last week he developed new onset of a small abscess on the superior aspect of the incision and was also found to have retained sutures under the skin. At this point given his worsening symptoms I recommend additional surgical treatment. I explained the risk, benefits, possible complication of the procedure to the patient and after answering all of his questions he consented undergo the above procedure. OPERATIVE PROCEDURE:  The patient was seen and evaluated in the  preoperative holding area where the right lower extremity was signed in  his presence. At this point, care of the patient was turned over to the  anesthesia team who transported him back to the operative suite. General anesthesia was applied and once adequate anesthesia was  obtained, the right lower extremity was prepped and draped in the usual  sterile fashion. Preoperative antibiotics were administered. At this  point, a time-out was performed and all in attendance were in agreement.      I made a vertical incision beginning at the proximal aspect of the incision overlying the abscess and carried sharp dissection down through subcutaneous tissue. Immediately upon entering the subcutaneous tissue I did encounter a pocket of purulent fluid which was expressed and cultures were taken from this location. I then continued dissection down to the level of the quadriceps tendon and the previous patellar tendon repair and found that the abscess did not track deep. I found that there was a retained suture on the lateral aspect of the incision site which did communicate with a small abscess. This was then removed without difficulty. I also found 1 additional retained suture at the distal aspect incision with no underlying abscess in that location. Once the foreign bodies were removed I then performed elliptical debridement of the skin around the edges of the abscess. The wound was then irrigated with sterile normal saline with gentamicin using bulb irrigation. I then closed subcutaneous tissue using 2-0 Vicryl suture followed by skin closure with 0 Prolene suture in horizontal mattress fashion. I then applied a  sterile soft dressing to the right lower extremity. The patient was then awakened from  anesthesia and transported to PACU in stable condition. He appeared to  have tolerated the procedure well. PROGNOSIS:  At this point, he will be discharged to home with a 10 day course of oral antibiotics as well as pain medication. He can have range of motion as tolerated and can be weightbearing as tolerated on the right leg. I will see him back in the office in 2 weeks and continue to monitor his progress in the outpatient setting for resolution of his symptoms.            Celia Ballesteros, DO

## 2021-08-26 ENCOUNTER — TELEPHONE (OUTPATIENT)
Dept: ORTHOPEDIC SURGERY | Age: 36
End: 2021-08-26

## 2021-08-26 NOTE — TELEPHONE ENCOUNTER
Spoke with patient regarding getting Covid vaccine with patient advised to wait on getting the vaccine and discuss time frame on getting the vaccine at his upcoming post operative follow up on September 13th. Pt voiced understanding.

## 2021-08-30 ENCOUNTER — HOSPITAL ENCOUNTER (OUTPATIENT)
Dept: PHYSICAL THERAPY | Age: 36
Setting detail: THERAPIES SERIES
Discharge: HOME | End: 2021-08-30
Payer: COMMERCIAL

## 2021-08-30 LAB
CULTURE: ABNORMAL
CULTURE: ABNORMAL
Lab: ABNORMAL
SPECIMEN: ABNORMAL

## 2021-08-30 PROCEDURE — 97110 THERAPEUTIC EXERCISES: CPT

## 2021-08-30 NOTE — FLOWSHEET NOTE
impairments, progress toward goal completion and improve ADL/IADL status. PT also warranted to reduce risk for further injury or decline. Subjective:  Kate Vazquez arrives to therapy stating that the knee is infected again, another staph infection. Had another surgery Wednesday - I & D and removal of sutures. Doc said that he can continue his normal daily activities and PT. Going up/down stairs at home, reciprocal pattern for up but step to for descending due to weakness. Any changes in Ambulatory Summary Sheet? None        Objective: COVID screening questions were asked and patient attested that there had been no contact or symptoms    Lag with SLR. Difficulty with wobble board in frontal plane. Exercises: (No more than 4 columns)   Exercise/Equipment 8/17/21 #1 8/30/2021 #2 Date           WARM UP      Nu-step   L3 5'           TABLE      LAQ 2X10, 3\" eccentric focus 2*10 3\" eccentric     SLR 2x10 eccentric focus 2*10 cues for quad set before every lift. STANDING      STS x10 eccentric focus 2*10    TKE with band   GTB 2*10 5\"    Anterior step up   6\" 20*                                 PROPRIOCEPTION      SLS x30\" 2*30\"     Wobble board   30\" ea dir                       MODALITIES                      Other Therapeutic Activities/Education:      Home Exercise Program: Issued, practiced and pt demo ability to perform 8/17/2021      Manual Treatments:        Modalities:  none      Communication with other providers:        Assessment:  Kate Vazquez tolerated session well. He is weak in terminal quads.  End session pain: 0/10      Plan for Next Session: gait, quad strength, protocol in chart      Time In / Time Out:    1730/1804    Timed Code/Total Treatment Minutes:  29' 2 TE        Next Progress Note due:  10th visit      Plan of Care Interventions:  [x] Therapeutic Exercise  [x] Modalities:  [x] Therapeutic Activity     [] Ultrasound  [x] Estim  [x] Gait Training      [] Cervical Traction [] Lumbar Traction  [x] Neuromuscular Re-education    [x] Cold/hotpack [] Iontophoresis   [x] Instruction in HEP      [x] Vasopneumatic   [] Dry Needling    [x] Manual Therapy               [] Aquatic Therapy              Electronically signed by:  Digna Villa PTA     8/30/2021, 10:31 AM

## 2021-09-01 ENCOUNTER — OFFICE VISIT (OUTPATIENT)
Dept: INFECTIOUS DISEASES | Age: 36
End: 2021-09-01
Payer: COMMERCIAL

## 2021-09-01 ENCOUNTER — HOSPITAL ENCOUNTER (OUTPATIENT)
Dept: PHYSICAL THERAPY | Age: 36
Setting detail: THERAPIES SERIES
Discharge: HOME | End: 2021-09-01
Payer: COMMERCIAL

## 2021-09-01 VITALS
SYSTOLIC BLOOD PRESSURE: 126 MMHG | HEART RATE: 76 BPM | WEIGHT: 274.6 LBS | TEMPERATURE: 96.8 F | RESPIRATION RATE: 18 BRPM | BODY MASS INDEX: 40.55 KG/M2 | DIASTOLIC BLOOD PRESSURE: 72 MMHG

## 2021-09-01 DIAGNOSIS — A49.01 MSSA (METHICILLIN SUSCEPTIBLE STAPHYLOCOCCUS AUREUS) INFECTION: Primary | ICD-10-CM

## 2021-09-01 DIAGNOSIS — T81.42XD INFECTION OF DEEP INCISIONAL SURGICAL SITE AFTER PROCEDURE, SUBSEQUENT ENCOUNTER: ICD-10-CM

## 2021-09-01 PROCEDURE — 97110 THERAPEUTIC EXERCISES: CPT

## 2021-09-01 PROCEDURE — G0283 ELEC STIM OTHER THAN WOUND: HCPCS

## 2021-09-01 PROCEDURE — 97112 NEUROMUSCULAR REEDUCATION: CPT

## 2021-09-01 PROCEDURE — 99214 OFFICE O/P EST MOD 30 MIN: CPT | Performed by: INTERNAL MEDICINE

## 2021-09-01 RX ORDER — CEPHALEXIN 250 MG/1
500 CAPSULE ORAL 4 TIMES DAILY
Qty: 32 CAPSULE | Refills: 0 | Status: SHIPPED | OUTPATIENT
Start: 2021-09-01 | End: 2021-09-01 | Stop reason: SDUPTHER

## 2021-09-01 RX ORDER — DOXYCYCLINE HYCLATE 100 MG
100 TABLET ORAL 2 TIMES DAILY
Qty: 28 TABLET | Refills: 0 | Status: SHIPPED | OUTPATIENT
Start: 2021-09-01 | End: 2021-09-15 | Stop reason: SDUPTHER

## 2021-09-01 NOTE — FLOWSHEET NOTE
Outpatient Physical Therapy  [place]           [x] Phone: [phone]   Fax: [phone]  [facility]           [] Phone: [phone]   Fax: [phone]        Physical Therapy Daily Treatment Note  Date:  2021    Patient Name:  Adam Matute    :  1985  MRN: [id number]  Restrictions/Precautions:   follow protocol  Diagnosis:   Diagnosis: Postop check. R patella tendon repair. R knee I&D  Date of Injury/Surgery: 21 repair. 21 debridment. Treatment Diagnosis: Treatment Diagnosis: RLE weakness, impaired gait    Insurance/Certification information: PT Insurance Information: UMR - 61 PCY   Referring Physician:  Referring Practitioner: Jose Springer DO  Next Doctor Visit:    Plan of care signed (Y/N):  Y  Outcome Measure: LEFS:   Visit# / total visits:   3/10  Pain level: 010     Goals:     Patient goals : improve mobility of right leg and get back to work  Short term goals  Time Frame for Short term goals: 5 visits  Short term goal 1: Pt will be IND with HEP in order to maximize recovery outside of clinic Reports compliance   Long term goals  Time Frame for Long term goals : 10 visits  Long term goal 1: Pt will improve R knee AROM from 0-115 to aide in stairs   Long term goal 2: Pt will improve gross RLE strength to 4+/5 to aide in ADLs  Long term goal 3: Pt will improve LEFS to 65 or higher to show [address] and subjective improvement  Long term goal 4: Pt will ambulate into clinic with no brace, no deviations and equal stance time    Summary of Evaluation: Assessment: Pt is a 41-year-old male who presents to clinic following a R patellar tendon repair on 21, as well as debridement, incision and drainage of infection on 21. Upon assessment, pt presents with impaired gait, impaired balance, impaired R knee strength and ROM and overall reduced independent with ADL/ iADL and work-related tasks.  Pt would benefit from skilled therapy interventions to address listed impairments, progress toward goal completion and improve ADL/IADL status. PT also warranted to reduce risk for further injury or decline. Subjective:  Pt stated that his knee isn't really painful just weak and stiff. Pt stated that he sees the  Dr. Laureen Bui after therapy. Any changes in Ambulatory Summary Sheet? None        Objective: COVID screening questions were asked and patient attested that there had been no contact or symptoms    Ext lag  with SLR. Katjarad Bolk Exercises: (No more than 4 columns)   Exercise/Equipment 8/17/21 #1 8/30/2021 #2 9/1/2021 #3           WARM UP      Nu-step   L3 5'  L-3 x 5'          TABLE      LAQ 2X10, 3\" eccentric focus 2*10 3\" eccentric  10x2 3\" eccentric    SLR 2x10 eccentric focus 2*10 cues for quad set before every lift. 10x2                         STANDING      STS x10 eccentric focus 2*10 19\" 10x2   TKE with band   GTB 2*10 5\" FT  7# 10x2 5\"    Anterior step up   6\" 20* 8\" 10x2                                PROPRIOCEPTION      SLS x30\" 2*30\"  30\" x2    Wobble board   30\" ea dir  30\" x2 ea                     MODALITIES      [place] E-stim                Other Therapeutic Activities/Education:      Home Exercise Program: Issued, practiced and pt demo ability to perform 8/17/2021      Manual Treatments:        Modalities:  none      Communication with other providers:        Assessment: Pt tolerated treatment well. Pt had trial of [place] E-stim to increase quad strength. Pt responded well to E-stim and had increased control with standing exercises afterwards. Pt rated pain at 0/10 after treatment Pt will continue to benefit from more strengthening.    Plan for Next Session: gait, quad strength, protocol in chart      Time In / Time Out:    0830/0918    Timed Code/Total Treatment Minutes:  38'/48'  1  E-stim (10') 1 TE (15') 2 neuro (23')       Next Progress Note due:  10th visit      Plan of Care Interventions:  [x] Therapeutic Exercise  [x] Modalities:  [x] Therapeutic Activity     [] Ultrasound  [x] Estim  [x] Gait Training      [] Cervical Traction [] Lumbar Traction  [x] Neuromuscular Re-education    [x] Cold/hotpack [] Iontophoresis   [x] Instruction in HEP      [x] Vasopneumatic   [] Dry Needling    [x] Manual Therapy               [] Aquatic Therapy              Electronically signed by:  Jerry Badillo PTA     9/1/2021, 8:30 AM     9/1/2021,12:57 PM

## 2021-09-01 NOTE — PROGRESS NOTES
9/1/2021         Referring Physician: No ref. provider found  Primary Care Physician: Jw Norman MD    Impression/Plan:   Diagnosis Orders   1. MSSA (methicillin susceptible Staphylococcus aureus) infection  doxycycline hyclate (VIBRA-TABS) 100 MG tablet    DISCONTINUED: cephALEXin (KEFLEX) 250 MG capsule   2. Infection of deep incisional surgical site after procedure, subsequent encounter  doxycycline hyclate (VIBRA-TABS) 100 MG tablet    DISCONTINUED: cephALEXin (KEFLEX) 250 MG capsule       Discussion:  Postoperative infection  Completed abx, but had a stitch abscess. Repeat I and D on 8/25/2021, cx positive for S aureus. Continue Keflex but start doxycycline as well. Return in about 2 weeks (around 9/15/2021). History: Julius Dominguez is a 28 y.o.  male presenting today for follow-up. Medical history of morbid obesity, hypertension, depression. Was seen in the hospitalWith right knee pain and edema. He had a right patella tendon rupture on 6/22/2021 while he was playing baseball. He underwent a repair of the right patella tendon using them as planned 5 x 5 cm graft on 6/22/2021. His symptom started on 6/29/2021. He underwent incision and drainage of right knee with excision of dermal spine graft. Culture was positive for MSSA. He was discharged to complete a 4-week course of cefazolin on 8/2/2021. Denies nausea, vomiting, or diarrhea  9/1/2021: He completed a 4-week course of antibiotics on 8-21. Had persistent right knee abscess and underwent I&D on 8/25/2021. Culture was positive for Staph aureus; patient was put placed on Keflex 500 mg orally 4 times daily for 10 days to end on 9/4/2021. He reports that he was told that he had a retained stitch. Denies dysphagia, n/v/d/f    Review of Systems   All other systems reviewed and are negative.       No Known Allergies    Patient Active Problem List   Diagnosis    Essential hypertension    Situational anxiety    History of attention deficit disorder    Rupture of right patellar tendon    Cellulitis of right knee    Postoperative infection    MSSA (methicillin susceptible Staphylococcus aureus) infection    Receiving intravenous antibiotic treatment as outpatient       Current Outpatient Medications   Medication Sig Dispense Refill    doxycycline hyclate (VIBRA-TABS) 100 MG tablet Take 1 tablet by mouth 2 times daily for 14 days 28 tablet 0    HYDROcodone-acetaminophen (NORCO) 5-325 MG per tablet Take 1 tablet by mouth every 4 hours as needed for Pain for up to 7 days. Intended supply: 3 days. Take lowest dose possible to manage pain 10 tablet 0    fluocinonide (LIDEX) 0.05 % external solution apply to rash ON scalp twice a day if needed for psoriasis FLARES      sertraline (ZOLOFT) 50 MG tablet Take 1 tablet by mouth daily 90 tablet 1    lisinopril-hydroCHLOROthiazide (PRINZIDE;ZESTORETIC) 10-12.5 MG per tablet Take 1 tablet by mouth daily 30 tablet 5     No current facility-administered medications for this visit.        Past Medical History:   Diagnosis Date    Hypertension        Past Surgical History:   Procedure Laterality Date    INGUINAL HERNIA REPAIR Left 1986    KNEE SURGERY Right 8/25/2021    RIGHT KNEE INCISION AND DRAINAGE performed by Francy Urbina DO at [address] Right 7/6/2021    RIGHT KNEE DEBRIDEMENT INCISION AND DRAINAGE performed by Francy Urbina DO at [address] [facility] Right 6/22/2021    RIGHT PATELLA TENDON REPAIR performed by Francy Urbina DO at [address] [facility] History     Socioeconomic History    Marital status:      Spouse name: Not on file    Number of children: Not on file    Years of education: Not on file    Highest education level: Not on file   Occupational History    Not on file   Tobacco Use    Smoking status: Never Smoker    Smokeless tobacco: Former User     Types: Chew   Vaping Use    Vaping Use: Never used   Substance and Sexual Activity    Alcohol use: Yes     Comment: Occasionally    Drug use: Never    Sexual activity: Yes     Partners: Female   Other Topics Concern    Not on file   Social History Narrative    Not on file     Social Determinants of Health     Financial Resource Strain: Low Risk     Difficulty of Paying Living Expenses: Not hard at all   Food Insecurity: No Food Insecurity    Worried About Running Out of Food in the Last Year: Never true    Noah of Food in the Last Year: Never true   Transportation Needs: No Transportation Needs    Lack of Transportation (Medical): No    Lack of Transportation (Non-Medical):  No   Physical Activity:     Days of Exercise per Week:     Minutes of Exercise per Session:    Stress:     Feeling of Stress :    Social Connections:     Frequency of Communication with Friends and Family:     Frequency of Social Gatherings with Friends and Family:     Attends Holiness Services:     Active Member of Clubs or Organizations:     Attends Club or Organization Meetings:     Marital Status:    Intimate Partner Violence:     Fear of Current or Ex-Partner:     Emotionally Abused:     Physically Abused:     Sexually Abused:        Family History   Problem Relation Age of Onset    Other Mother         Hodgekin's    Cancer Father         Testicular       Vital Signs:  Vitals:    09/01/21 1001   BP: 126/72   Site: Left Upper Arm   Position: Sitting   Cuff Size: Large Adult   Pulse: 76   Resp: 18   Temp: 96.8 °F (36 °C)   TempSrc: Infrared   Weight: 274 lb 9.6 oz (124.6 kg)        Wt Readings from Last 3 Encounters:   09/01/21 274 lb 9.6 oz (124.6 kg)   08/25/21 270 lb (122.5 kg)   08/23/21 270 lb (122.5 kg)        Physical Exam:   Gen: alert and NAD  HEENT: sclera clear, pupils equal and reactive, extra ocular muscles intact, oropharynx clear, mucus membranes moist, tympanic membranes clear bilaterally, no cervical lymphadenopathy noted and neck supple  Neck: supple, no significant adenopathy  Chest: clear to auscultation, no wheezes, rales or rhonchi, symmetric air entry  Heart: regular rate and rhythm, no murmurs  ABD: abdomen is soft without significant tenderness, masses, organomegaly or guarding. EXT:peripheral pulses normal, no pedal edema, no clubbing or cyanosis. Left arm PICC line. NEURO: alert, oriented, normal speech, no focal findings or movement disorder noted  Skin: well hydrated, no lesions, surgical site examined  Wounds: right knee surgical scar, nil tenderness.    Labs:   WBC   Date Value Ref Range Status   08/27/2021 4.7 4.0 - 11.0 K/uL Final   07/26/2021 5.5 10^3/mL Final   07/19/2021 6.3 10^3/mL Final     CREATININE   Date Value Ref Range Status   08/27/2021 0.7 (L) 0.9 - 1.3 mg/dL Final   07/26/2021 0.8  Final   07/19/2021 0.7  Final       Cultures:  Culture   Date Value Ref Range Status   08/25/2021 Final Report No anaerobes isolated  Final   08/25/2021 STAPHYLOCOCCUS AUREUS Light growth (A)  Final   07/06/2021 Final Report  Final   07/06/2021 (A)  Final    STAPHYLOCOCCUS AUREUS Moderate growth PBP2= Negative       Imaging Studies:         Electronicallysigned by Harjit Yarbrough MD on 7/28/21 at 7:54 AM EDT

## 2021-09-02 NOTE — ASSESSMENT & PLAN NOTE
Completed abx, but had a stitch abscess. Repeat I and D on 8/25/2021, cx positive for S aureus. Continue Keflex but start doxycycline as well.

## 2021-09-13 ENCOUNTER — OFFICE VISIT (OUTPATIENT)
Dept: ORTHOPEDIC SURGERY | Age: 36
End: 2021-09-13

## 2021-09-13 ENCOUNTER — HOSPITAL ENCOUNTER (OUTPATIENT)
Dept: PHYSICAL THERAPY | Age: 36
Setting detail: THERAPIES SERIES
Discharge: HOME | End: 2021-09-13
Payer: COMMERCIAL

## 2021-09-13 VITALS
RESPIRATION RATE: 16 BRPM | HEART RATE: 75 BPM | HEIGHT: 69 IN | BODY MASS INDEX: 40.58 KG/M2 | OXYGEN SATURATION: 98 % | WEIGHT: 274 LBS

## 2021-09-13 DIAGNOSIS — S86.811A RUPTURE OF RIGHT PATELLAR TENDON, INITIAL ENCOUNTER: ICD-10-CM

## 2021-09-13 DIAGNOSIS — Z09 POSTOP CHECK: Primary | ICD-10-CM

## 2021-09-13 PROCEDURE — 99024 POSTOP FOLLOW-UP VISIT: CPT | Performed by: PHYSICIAN ASSISTANT

## 2021-09-13 PROCEDURE — G0283 ELEC STIM OTHER THAN WOUND: HCPCS

## 2021-09-13 PROCEDURE — 97530 THERAPEUTIC ACTIVITIES: CPT

## 2021-09-13 PROCEDURE — 97110 THERAPEUTIC EXERCISES: CPT

## 2021-09-13 PROCEDURE — 97112 NEUROMUSCULAR REEDUCATION: CPT

## 2021-09-13 NOTE — PROGRESS NOTES
Date of surgery:   August 25th   Surgeon: Francy Urbina    History:  Mr. Bhavna Davis is here in follow up regarding his right knee I&D DOS 08/25/2021. The patient did have a right patellar tendon repair prior to this I&D which did get infected and then was washed out and then subsequently came back to the office following that washout and had retained suture that appeared to have developed into another abscess on the superior portion of the incision. Overall this is his third surgery on this knee. He states that he is doing well. He is having 0/10 pain. He denies chest pain, SOB, calf pain,fever,wound drainage. No other issues. Physical:  Vitals:    09/13/21 1623   Pulse: 75   Resp: 16   SpO2: 98%   Weight: 274 lb (124.3 kg)   Height: 5' 9\" (1.753 m)     Incision is intact with Prolene sutures in place. No significant joint effusion present. Range of motion of the right knee 3-110 degrees      Impression: Status post above, doing well       Plan:   I personally removed all sutures from the patient's knee today. Patient is okay to go back to work as previously discussed  Follow-up in 3-4 weeks. Continue to work on range of motion.

## 2021-09-13 NOTE — PATIENT INSTRUCTIONS
Stitches removed  Continue weight bear as tolerated  Continue range of motion and exercises as instruction  Ice and elevate as needed  Tylenol or Motrin for pain  May now get incision wet, but do not submerge the incision  Follow up in 3 weeks with Dr. Marc Milian.

## 2021-09-13 NOTE — FLOWSHEET NOTE
Outpatient Physical Therapy  [place]           [x] Phone: [phone]   Fax: [phone]  [place] [facility]           [] Phone: [phone]   Fax: [phone]        Physical Therapy Daily Treatment Note  Date:  2021    Patient Name:  Noah Hanks    :  1985  MRN: [id number]  Restrictions/Precautions:   follow protocol  Diagnosis:   Diagnosis: Postop check. R patella tendon repair. R knee I&D  Date of Injury/Surgery: 21 repair. 21 debridment. Treatment Diagnosis: Treatment Diagnosis: RLE weakness, impaired gait    Insurance/Certification information: PT Insurance Information: UMR - 61 PCY   Referring Physician:  Referring Practitioner: DO Mateusz Donaldson Doctor Visit:    Plan of care signed (Y/N):  Y  Outcome Measure: LEFS:   Visit# / total visits:   4/10  Pain level: 0/10     Goals:     Patient goals : improve mobility of right leg and get back to work  Short term goals  Time Frame for Short term goals: 5 visits  Short term goal 1: Pt will be IND with HEP in order to maximize recovery outside of clinic Reports compliance   Long term goals  Time Frame for Long term goals : 10 visits  Long term goal 1: Pt will improve R knee AROM from 0-115 to aide in stairs   Long term goal 2: Pt will improve gross RLE strength to 4+/5 to aide in ADLs  Long term goal 3: Pt will improve LEFS to 65 or higher to show [address] and subjective improvement  Long term goal 4: Pt will ambulate into clinic with no brace, no deviations and equal stance time    Summary of Evaluation: Assessment: Pt is a 57-year-old male who presents to clinic following a R patellar tendon repair on 21, as well as debridement, incision and drainage of infection on 21. Upon assessment, pt presents with impaired gait, impaired balance, impaired R knee strength and ROM and overall reduced independent with ADL/ iADL and work-related tasks.  Pt would benefit from skilled therapy interventions to address listed impairments, progress toward goal completion and improve ADL/IADL status. PT also warranted to reduce risk for further injury or decline. Subjective:  Pt stated that he isn't having any pain today. Pt stated that he felt like the nu-step and e-stim really helped. Pt stated that Dr Ramona Rodney extended the antibiotic treatment and  he finishes up with the antibiotic tomorrow. Pt stated that he sees Dr. Ramona Rodney on Wednesday. Pt stated that he is hopeful that he will be returning to work soon. Any changes in Ambulatory Summary Sheet? None        Objective: COVID screening questions were asked and patient attested that there had been no contact or symptoms    Ext lag  with SLR. Exercises: (No more than 4 columns)   Exercise/Equipment 8/17/21 #1 8/30/2021 #2 9/1/2021 #3 9/13/2021 #4            WARM UP       Nu-step   L3 5'  L-3 x 5'  L-4 x 5'           TABLE       LAQ 2X10, 3\" eccentric focus 2*10 3\" eccentric  10x2 3\" eccentric  10x2 3\" eccentric   SLR 2x10 eccentric focus 2*10 cues for quad set before every lift. 10x2  10x2 cues for quad set before every lift. STANDING       STS x10 eccentric focus 2*10 19\" 10x2 21\" 10x2   TKE with band   GTB 2*10 5\" FT  7# 10x2 5\"  FT 7# 10x2 5\"    Anterior step up   6\" 20* 8\" 10x2 10\" 10x2                                    PROPRIOCEPTION       SLS x30\" 2*30\"  30\" x2     Wobble board   30\" ea dir  30\" x2 ea 30\" x2                        MODALITIES       [place] E-stim   With QS With QS              Other Therapeutic Activities/Education:      Home Exercise Program: Issued, practiced and pt demo ability to perform 8/17/2021      Manual Treatments:        Modalities:  none      Communication with other providers:        Assessment: Pt tolerated treatment well. Pt was able to increase activity in the gym today. . Pt rated pain at 0/10 after treatment Pt will continue to benefit from more strengthening.    Plan for Next Session: gait, quad strength, protocol in chart      Time In / Time Out: 0917/1012       Timed Code/Total Treatment Minutes:  45'/55'  1 e-stim (10' 1 TE (15') 1 neuro (20') 1 TA (10')       Next Progress Note due:  10th visit      Plan of Care Interventions:  [x] Therapeutic Exercise  [x] Modalities:  [x] Therapeutic Activity     [] Ultrasound  [x] Estim  [x] Gait Training      [] Cervical Traction [] Lumbar Traction  [x] Neuromuscular Re-education    [x] Cold/hotpack [] Iontophoresis   [x] Instruction in HEP      [x] Vasopneumatic   [] Dry Needling    [x] Manual Therapy               [] Aquatic Therapy              Electronically signed by:  Maria Elena Gorman PTA     9/13/2021, 8:31 AM      9/13/2021,12:29 PM

## 2021-09-13 NOTE — LETTER
[facility]  [address]  Phone: [phone]  Fax: [phone]    Luana Montoya        September 13, 2021     Patient: Deep Rodriguez   YOB: 1985   Date of Visit: 9/13/2021       To Whom It May Concern: It is my medical opinion that [employer] may return to work on 9/16/2021. If you have any questions or concerns, please don't hesitate to call.     Sincerely,        Cassidy Tillman PA-C

## 2021-09-15 ENCOUNTER — HOSPITAL ENCOUNTER (OUTPATIENT)
Dept: PHYSICAL THERAPY | Age: 36
Setting detail: THERAPIES SERIES
Discharge: HOME | End: 2021-09-15
Payer: COMMERCIAL

## 2021-09-15 ENCOUNTER — OFFICE VISIT (OUTPATIENT)
Dept: INFECTIOUS DISEASES | Age: 36
End: 2021-09-15
Payer: COMMERCIAL

## 2021-09-15 VITALS
DIASTOLIC BLOOD PRESSURE: 75 MMHG | SYSTOLIC BLOOD PRESSURE: 131 MMHG | BODY MASS INDEX: 41.59 KG/M2 | HEART RATE: 99 BPM | WEIGHT: 281.6 LBS | RESPIRATION RATE: 18 BRPM | TEMPERATURE: 97.3 F

## 2021-09-15 DIAGNOSIS — T81.42XS INFECTION OF DEEP INCISIONAL SURGICAL SITE AFTER PROCEDURE, SEQUELA: ICD-10-CM

## 2021-09-15 DIAGNOSIS — A49.01 MSSA (METHICILLIN SUSCEPTIBLE STAPHYLOCOCCUS AUREUS) INFECTION: Primary | ICD-10-CM

## 2021-09-15 DIAGNOSIS — T81.42XD INFECTION OF DEEP INCISIONAL SURGICAL SITE AFTER PROCEDURE, SUBSEQUENT ENCOUNTER: ICD-10-CM

## 2021-09-15 PROCEDURE — 97112 NEUROMUSCULAR REEDUCATION: CPT

## 2021-09-15 PROCEDURE — 99213 OFFICE O/P EST LOW 20 MIN: CPT | Performed by: INTERNAL MEDICINE

## 2021-09-15 PROCEDURE — 97110 THERAPEUTIC EXERCISES: CPT

## 2021-09-15 RX ORDER — DOXYCYCLINE HYCLATE 100 MG
100 TABLET ORAL 2 TIMES DAILY
Qty: 28 TABLET | Refills: 0 | Status: SHIPPED | OUTPATIENT
Start: 2021-09-15 | End: 2021-09-29

## 2021-09-15 NOTE — ASSESSMENT & PLAN NOTE
Improving, treat with another 2 weeks of doxycycline. Patient is to call if there are any worrisome changes.

## 2021-09-15 NOTE — PROGRESS NOTES
anxiety    History of attention deficit disorder    Rupture of right patellar tendon    Cellulitis of right knee    Postoperative infection    MSSA (methicillin susceptible Staphylococcus aureus) infection    Receiving intravenous antibiotic treatment as outpatient       Current Outpatient Medications   Medication Sig Dispense Refill    doxycycline hyclate (VIBRA-TABS) 100 MG tablet Take 1 tablet by mouth 2 times daily for 14 days 28 tablet 0    fluocinonide (LIDEX) 0.05 % external solution apply to rash ON scalp twice a day if needed for psoriasis FLARES      sertraline (ZOLOFT) 50 MG tablet Take 1 tablet by mouth daily 90 tablet 1    lisinopril-hydroCHLOROthiazide (PRINZIDE;ZESTORETIC) 10-12.5 MG per tablet Take 1 tablet by mouth daily 30 tablet 5     No current facility-administered medications for this visit.        Past Medical History:   Diagnosis Date    Hypertension        Past Surgical History:   Procedure Laterality Date    INGUINAL HERNIA REPAIR Left 1986    KNEE SURGERY Right 8/25/2021    RIGHT KNEE INCISION AND DRAINAGE performed by Bong Mabry DO at [address] [facility] Right 7/6/2021    RIGHT KNEE DEBRIDEMENT INCISION AND DRAINAGE performed by Bong Mabry DO at [address] [facility] Right 6/22/2021    RIGHT PATELLA TENDON REPAIR performed by Bong Mabry DO at [address] [facility] [address] History     Socioeconomic History    Marital status:      Spouse name: Not on file    Number of children: Not on file    Years of education: Not on file    Highest education level: Not on file   Occupational History    Not on file   Tobacco Use    Smoking status: Never Smoker    Smokeless tobacco: Former User     Types: Chew   Vaping Use    Vaping Use: Never used   Substance and Sexual Activity    Alcohol use: Yes     Comment: Occasionally    Drug use: Never    Sexual activity: Yes     Partners: Female   Other Topics Concern    Not on file   Social History Narrative    Not on file     Social Determinants of Health     Financial Resource Strain: Low Risk     Difficulty of Paying Living Expenses: Not hard at all   Food Insecurity: No Food Insecurity    Worried About Running Out of Food in the Last Year: Never true    [address] in the Last Year: Never true   Transportation Needs: No Transportation Needs    Lack of Transportation (Medical): No    Lack of Transportation (Non-Medical):  No   Physical Activity:     Days of Exercise per Week:     Minutes of Exercise per Session:    Stress:     Feeling of Stress :    Social Connections:     Frequency of Communication with Friends and Family:     Frequency of Social Gatherings with Friends and Family:     Attends [employer] Services:     Active Member of Clubs or Organizations:     Attends Club or Organization Meetings:     Marital Status:    Intimate Partner Violence:     Fear of Current or Ex-Partner:     Emotionally Abused:     Physically Abused:     Sexually Abused:        Family History   Problem Relation Age of Onset    Other Mother         Adriano's    Cancer Father         Testicular       Vital Signs:  Vitals:    09/15/21 1143   BP: 131/75   Site: Left Upper Arm   Position: Sitting   Cuff Size: Large Adult   Pulse: 99   Resp: 18   Temp: 97.3 °F (36.3 °C)   TempSrc: Infrared   Weight: 281 lb 9.6 oz (127.7 kg)        Wt Readings from Last 3 Encounters:   09/15/21 281 lb 9.6 oz (127.7 kg)   09/13/21 274 lb (124.3 kg)   09/01/21 274 lb 9.6 oz (124.6 kg)        Physical Exam:   Gen: alert and NAD  HEENT: sclera clear, pupils equal and reactive, extra ocular muscles intact, oropharynx clear, mucus membranes moist, tympanic membranes clear bilaterally, no cervical lymphadenopathy noted and neck supple  Neck: supple, no significant adenopathy  Chest: clear to auscultation, no wheezes, rales or rhonchi, symmetric air entry  Heart: regular rate and rhythm, no murmurs  ABD: abdomen is soft without significant tenderness, masses, organomegaly or guarding. EXT:peripheral pulses normal, no pedal edema, no clubbing or cyanosis. Left arm PICC line. NEURO: alert, oriented, normal speech, no focal findings or movement disorder noted  Skin: well hydrated, no lesions, surgical site examined  Wounds: right knee surgical scar, nil tenderness, slight differential warmth. Labs:   WBC   Date Value Ref Range Status   08/27/2021 4.7 4.0 - 11.0 K/uL Final   07/26/2021 5.5 10^3/mL Final   07/19/2021 6.3 10^3/mL Final     CREATININE   Date Value Ref Range Status   08/27/2021 0.7 (L) 0.9 - 1.3 mg/dL Final   07/26/2021 0.8  Final   07/19/2021 0.7  Final       Cultures:  Culture   Date Value Ref Range Status   08/25/2021 Final Report No anaerobes isolated  Final   08/25/2021 STAPHYLOCOCCUS AUREUS Light growth (A)  Final   07/06/2021 Final Report  Final   07/06/2021 (A)  Final    STAPHYLOCOCCUS AUREUS Moderate growth PBP2= Negative       Imaging Studies: This dictation was created with voice recognition software. While attempts have been made to review the dictation as it is transcribed, on occasion the spoken word can be misinterpreted by the technology leading to omissions or inappropriate words, phrases or sentences.     Electronically signed by: Electronically signed by Simin Quiroz MD on 9/16/2021 at 7:16 AM, 9/16/2021 7:16 AM

## 2021-09-15 NOTE — FLOWSHEET NOTE
Outpatient Physical Therapy  [place]           [x] Phone: [phone]   Fax: [phone]  [place]           [] Phone: [phone]   Fax: [phone]        Physical Therapy Daily Treatment Note  Date:  9/15/2021    Patient Name:  Adelita Astorga    :  1985  MRN: [id number]  Restrictions/Precautions:   follow protocol  Diagnosis:   Diagnosis: Postop check. R patella tendon repair. R knee I&D  Date of Injury/Surgery: 21 repair. 21 debridment. Treatment Diagnosis: Treatment Diagnosis: RLE weakness, impaired gait    Insurance/Certification information: PT Insurance Information: U[employer] - 61 PCY   Referring Physician:  Referring Practitioner: DO Mateusz Serrano Doctor Visit:    Plan of care signed (Y/N):  Y  Outcome Measure: LEFS: 80  Visit# / total visits:   10  Pain level: 0/10     Goals:     Patient goals : improve mobility of right leg and get back to work  Short term goals  Time Frame for Short term goals: 5 visits  Short term goal 1: Pt will be IND with HEP in order to maximize recovery outside of clinic Reports compliance   Long term goals  Time Frame for Long term goals : 10 visits  Long term goal 1: Pt will improve R knee AROM from 0-115 to aide in stairs   Long term goal 2: Pt will improve gross RLE strength to 4+/5 to aide in ADLs  Long term goal 3: Pt will improve LEFS to 65 or higher to show [address] and subjective improvement  Long term goal 4: Pt will ambulate into clinic with no brace, no deviations and equal stance time    Summary of Evaluation: Assessment: Pt is a 70-year-old male who presents to clinic following a R patellar tendon repair on 21, as well as debridement, incision and drainage of infection on 21. Upon assessment, pt presents with impaired gait, impaired balance, impaired R knee strength and ROM and overall reduced independent with ADL/ iADL and work-related tasks.  Pt would benefit from skilled therapy interventions to address listed impairments, progress toward goal completion and improve ADL/IADL status. PT also warranted to reduce risk for further injury or decline. Subjective:  Pt continues to report of no pain. States he is returning to work tomorrow regular duty. Any changes in Ambulatory Summary Sheet? None        Objective: COVID screening questions were asked and patient attested that there had been no contact or symptoms    Min quad lag with SLR. Able to correct with cueing. Exercises: (No more than 4 columns)   Exercise/Equipment 8/30/2021 #2 9/1/2021 #3 9/13/2021 #4 9/15/21 #5            WARM UP       Nu-step  L3 5'  L-3 x 5'  L-4 x 5'  L-5 x 5'          TABLE       LAQ 2*10 3\" eccentric  10x2 3\" eccentric  10x2 3\" eccentric 10x2 3\" eccentric   SLR 2*10 cues for quad set before every lift. 10x2  10x2 cues for quad set before every lift. 10x 2 initiating with quad set                           STANDING       STS 2*10 19\" 10x2 21\" 10x2 21\" 10x2   TKE with band  GTB 2*10 5\" FT  7# 10x2 5\"  FT 7# 10x2 5\"  FT 7# 10x2 5\"   Anterior step up  6\" 20* 8\" 10x2 10\" 10x2 10\" 10x2                                    PROPRIOCEPTION       SLS 2*30\"  30\" x2      Wobble board  30\" ea dir  30\" x2 ea 30\" x2 30\" x2 ea dir                        MODALITIES       [place] E-stim  With QS With QS With quad set              Other Therapeutic Activities/Education:      Home Exercise Program: Issued, practiced and pt demo ability to perform 8/17/2021      Manual Treatments:        Modalities:  Received quad stim 2500HZ x 10 min, 10/10. Communication with other providers:        Assessment: Pt tolerated treatment well. Pt rated pain at 0/10 after treatment Pt will continue to benefit from more strengthening. Is returning to work tomorrow regular duty.     Plan for Next Session: gait, quad strength, protocol in chart      Time In / Time Out: 6502/1706       Timed Code/Total Treatment Minutes:  36'/46' , (2) TE, (1) NR,      Next Progress

## 2021-09-20 ENCOUNTER — HOSPITAL ENCOUNTER (OUTPATIENT)
Dept: PHYSICAL THERAPY | Age: 36
Setting detail: THERAPIES SERIES
Discharge: HOME | End: 2021-09-20
Payer: COMMERCIAL

## 2021-09-20 PROCEDURE — 97530 THERAPEUTIC ACTIVITIES: CPT

## 2021-09-20 PROCEDURE — G0283 ELEC STIM OTHER THAN WOUND: HCPCS

## 2021-09-20 PROCEDURE — 97112 NEUROMUSCULAR REEDUCATION: CPT

## 2021-09-20 NOTE — FLOWSHEET NOTE
Outpatient Physical Therapy  [place]           [x] Phone: [phone]   Fax: [phone]  [facility]           [] Phone: [phone]   Fax: [phone]        Physical Therapy Daily Treatment Note  Date:  2021    Patient Name:  Julienne Alfonso    :  1985  MRN: [id number]  Restrictions/Precautions:   follow protocol  Diagnosis:   Diagnosis: Postop check. R patella tendon repair. R knee I&D  Date of Injury/Surgery: 21 repair. 21 debridment. Treatment Diagnosis: Treatment Diagnosis: RLE weakness, impaired gait    Insurance/Certification information: PT Insurance Information: UMR - 61 PCY   Referring Physician:  Referring Practitioner: DO Mateusz Fernández Doctor Visit:    Plan of care signed (Y/N):  Y  Outcome Measure: LEFS: 80  Visit# / total visits:   6/10  Pain level: 010     Goals:     Patient goals : improve mobility of right leg and get back to work  Short term goals  Time Frame for Short term goals: 5 visits  Short term goal 1: Pt will be IND with HEP in order to maximize recovery outside of clinic Reports compliance   Long term goals  Time Frame for Long term goals : 10 visits  Long term goal 1: Pt will improve R knee AROM from 0-115 to aide in stairs   Long term goal 2: Pt will improve gross RLE strength to 4+/5 to aide in ADLs  Long term goal 3: Pt will improve LEFS to 65 or higher to show [address] and subjective improvement  Long term goal 4: Pt will ambulate into clinic with no brace, no deviations and equal stance time    Summary of Evaluation: Assessment: Pt is a 59-year-old male who presents to clinic following a R patellar tendon repair on 21, as well as debridement, incision and drainage of infection on 21. Upon assessment, pt presents with impaired gait, impaired balance, impaired R knee strength and ROM and overall reduced independent with ADL/ iADL and work-related tasks.  Pt would benefit from skilled therapy interventions to address listed Pt rated pain at 0/10 after treatment Pt will continue to benefit from more strengthening. Is returning to work tomorrow regular duty.     Plan for Next Session: gait, quad strength, protocol in chart      Time In / Time Out:  0830/ 0920    Timed Code/Total Treatment Minutes:  50'/50' 1 TA (20') 1 neuro (20') 1 E-stim (10')       Next Progress Note due:  10th visit      Plan of Care Interventions:  [x] Therapeutic Exercise  [x] Modalities:  [x] Therapeutic Activity     [] Ultrasound  [x] Estim  [x] Gait Training      [] Cervical Traction [] Lumbar Traction  [x] Neuromuscular Re-education    [x] Cold/hotpack [] Iontophoresis   [x] Instruction in HEP      [x] Vasopneumatic   [] Dry Needling    [x] Manual Therapy               [] Aquatic Therapy              Electronically signed by:  Renato Fournier PTA     9/20/2021,12:32 PM

## 2021-09-22 ENCOUNTER — HOSPITAL ENCOUNTER (OUTPATIENT)
Dept: PHYSICAL THERAPY | Age: 36
Setting detail: THERAPIES SERIES
Discharge: HOME | End: 2021-09-22
Payer: COMMERCIAL

## 2021-09-22 PROCEDURE — 97110 THERAPEUTIC EXERCISES: CPT

## 2021-09-22 PROCEDURE — 97112 NEUROMUSCULAR REEDUCATION: CPT

## 2021-09-22 NOTE — FLOWSHEET NOTE
Outpatient Physical Therapy  [place]           [x] Phone: [phone]   Fax: [phone]  [place]           [] Phone: [phone]   Fax: [phone]        Physical Therapy Daily Treatment Note  Date:  2021    Patient Name:  Radha Noble    :  1985  MRN: [id number]  Restrictions/Precautions:   follow protocol  Diagnosis:   Diagnosis: Postop check. R patella tendon repair. R knee I&D  Date of Injury/Surgery: 21 repair. 21 debridment. Treatment Diagnosis: Treatment Diagnosis: RLE weakness, impaired gait    Insurance/Certification information: PT Insurance Information: UMR - 60 PCY   Referring Physician:  Referring Practitioner: DO Mateusz Traylor Doctor Visit:  10/4  Plan of care signed (Y/N):  Y  Outcome Measure: LEFS: 56/80  Visit# / total visits:   7/10  Pain level: 0/10     Goals:     Patient goals : improve mobility of right leg and get back to work  Short term goals  Time Frame for Short term goals: 5 visits  Short term goal 1: Pt will be IND with HEP in order to maximize recovery outside of clinic MET   Long term goals  Time Frame for Long term goals : 10 visits  Long term goal 1: Pt will improve R knee AROM from 0-115 to aide in stairs MET   Long term goal 2: Pt will improve gross RLE strength to 4+/5 to aide in ADLs Progressing   Long term goal 3: Pt will improve LEFS to 65 or higher to show [address] and subjective improvement Not MET   Long term goal 4: Pt will ambulate into clinic with no brace, no deviations and equal stance time Almost MET     Summary of Evaluation: Assessment: Pt is a 27-year-old male who presents to clinic following a R patellar tendon repair on 21, as well as debridement, incision and drainage of infection on 21. Upon assessment, pt presents with impaired gait, impaired balance, impaired R knee strength and ROM and overall reduced independent with ADL/ iADL and work-related tasks.  Pt would benefit from skilled therapy interventions to address listed impairments, progress toward goal completion and improve ADL/IADL status. PT also warranted to reduce risk for further injury or decline. Subjective: Pt is doing well this date. He started back to work and it is going well. He notes that his knee will still occasionally buckle but is occurring less often than before. Pt also still has some trouble going down stairs due to apprehension about buckling (it has not yet buckled down stairs but feels somewhat unstable). LEFS: 55/80    Any changes in Ambulatory Summary Sheet?   None      Objective: COVID screening questions were asked and patient attested that there had been no contact or symptoms    AROM R knee: (in deg)  Flex: 122  Ext: 0    Strength RLE:   R Hip Flexion: 5/5  R Hip ABduction: 5/5  R Hip ADduction: 5/5  R Knee Flexion: 4+/5  R Knee Extension: 4/5    Gait: very mild reduced stance RLE, no brace use    Exercises: (No more than 4 columns)   Exercise/Equipment 9/15/21 #5 9/20/2021 #6 9/22/21 #7           WARM UP      Nu-step  L-5 x 5' L-5 x 5'  L4 x5'         TABLE      LAQ 10x2 3\" eccentric 10x2  3\"  2x10   SLR 10x 2 initiating with quad set  2x10                        STANDING      STS 21\" 10x2  x10    TKE with band  FT 7# 10x2 5\"     Anterior step up  10\" 10x2 10\" up, over w/ R  and down w/L 10x2     Shuttle leg press   4C  10x2    SL squat to table   2x5 to 27\" table   Lateral step up  6\" up and over and down 10x2 ea LE 6\" RLE lead x10   FT retro walk   13#    FT fwd walk         PROPRIOCEPTION      SLS   2x30\" airex   Wobble board  30\" x2 ea dir 45\"  ea dir                      MODALITIES      [place] E-stim With quad set W/ SAQ              Other Therapeutic Activities/Education:  HEP and importance of completion    Home Exercise Program: Issued, practiced and pt demo ability to perform 8/17/2021      Manual Treatments:        Modalities:       Communication with other providers:        Assessment: Pt tolerated today's treatment without any adverse reactions or complications this date. Pt has shown good progress thus far in therapy with improved strength and ROM at the R knee, improved gait and overall improved independence with mobility. Pt still presents with quad weakness to be addressed in further sessions, but he has shown good goal progression. Pt would continue to benefit from skilled therapy interventions to address remaining impairments, improve mobility and strength and progress toward goal completion while reducing risk for re-injury or further decline.   End pain: 0/10    Plan for Next Session: gait, quad strength, protocol in chart    Time In / Time Out:  4582 - 0721     Timed Code/Total Treatment Minutes:  39': 29' TE x2, 11' NMR x1      Next Progress Note due:  10th visit      Plan of Care Interventions:  [x] Therapeutic Exercise  [x] Modalities:  [x] Therapeutic Activity     [] Ultrasound  [x] Estim  [x] Gait Training      [] Cervical Traction [] Lumbar Traction  [x] Neuromuscular Re-education    [x] Cold/hotpack [] Iontophoresis   [x] Instruction in HEP      [x] Vasopneumatic   [] Dry Needling    [x] Manual Therapy               [] Aquatic Therapy              Electronically signed by:  Bear [place]en[place], PT, DPT    9/22/2021,11:04 AM

## 2021-09-22 NOTE — PROGRESS NOTES
to benefit from skilled therapy interventions to address remaining impairments, improve mobility and strength and progress toward goal completion while reducing risk for re-injury or further decline. Goal Status:  [] Achieved [x] Partially Achieved  [] Not Achieved     Changes to goals:    Patient goals : improve mobility of right leg and get back to work  Short term goals  Time Frame for Short term goals: 5 visits  Short term goal 1: Pt will be IND with HEP in order to maximize recovery outside of clinic MET 9/22  Long term goals  Time Frame for Long term goals : 10 visits  Long term goal 1: Pt will improve R knee AROM from 0-115 to aide in stairs MET 9/22  Long term goal 2: Pt will improve gross RLE strength to 4+/5 to aide in ADLs Progressing 9/22  Long term goal 3: Pt will improve LEFS to 65 or higher to show [address] and subjective improvement Not MET 9/22  Long term goal 4: Pt will ambulate into clinic with no brace, no deviations and equal stance time Almost MET 9/22        Frequency/Duration:  # Days per week: [] 1 day # Weeks: [] 1 week [x] 4 weeks [] 8 weeks     [x] 2 days   [] 2 weeks [] 5 weeks [] 10 weeks     [] 3 days   [] 3 weeks [] 6 weeks [] 12 weeks       Rehab Potential: [] Excellent [x] Good [] Fair  [] Poor       Patient Status: [] Continue per initial plan of Care     [] Patient now discharged     [x] Additional visits requested, Please re-certify for additional visits:      Requested frequency/duration:  2 /week for 4 weeks    If we are requesting more visits, we fully anticipate the patient's condition is expected to improve within the treatment timeframe we are requesting. Electronically signed by:  Dontrell Edmond PT, DPT, 9/22/2021, 11:05 AM    If you have any questions or concerns, please don't hesitate to call.   Thank you for your referral.    Physician Signature:______________________ Date:______ Time: ________  By signing above, therapists plan is approved by physician

## 2021-09-27 ENCOUNTER — HOSPITAL ENCOUNTER (OUTPATIENT)
Dept: PHYSICAL THERAPY | Age: 36
Setting detail: THERAPIES SERIES
Discharge: HOME | End: 2021-09-27
Payer: COMMERCIAL

## 2021-09-27 PROCEDURE — 97530 THERAPEUTIC ACTIVITIES: CPT

## 2021-09-27 PROCEDURE — 97110 THERAPEUTIC EXERCISES: CPT

## 2021-09-27 PROCEDURE — 97112 NEUROMUSCULAR REEDUCATION: CPT

## 2021-09-27 NOTE — FLOWSHEET NOTE
interventions to address listed impairments, progress toward goal completion and improve ADL/IADL status. PT also warranted to reduce risk for further injury or decline. Subjective: Pt stated that he isn't having any pain today. Pt stated that he completed a full week of work and was notably tired afterwards. Pt stated that he finishes his antibiotic tomorrow and doesn't have a follow up with Dr. Linda Goodman . Pt stated that he sees surgeon on Oct 4th. Any changes in Ambulatory Summary Sheet?   None      Objective: COVID screening questions were asked and patient attested that there had been no contact or symptoms  Challenged with SLS w/ lateral toss into RB  Challenged with lateral heel taps    Exercises: (No more than 4 columns)   Exercise/Equipment 9/15/21 #5 9/20/2021 #6 9/22/21 #7 9/27/2021 #8            WARM UP       Nu-step  L-5 x 5' L-5 x 5'  L4 x5' L-4 x 5'   Elliptical       TABLE       SAQ    10x2 5\"   LAQ 10x2 3\" eccentric 10x2  3\"  2x10 10x2 3\"    SLR 10x 2 initiating with quad set  2x10 10x2   Single leg bridge    10x2                    STANDING       STS 21\" 10x2  x10  20\" stagger R behind L 10x2   TKE with band  FT 7# 10x2 5\"      Anterior step up  10\" 10x2 10\" up, over w/ R  and down w/L 10x2      Shuttle leg press   4C  10x2  DL 4C 10x2  SL 2C 10x2   SL squat to table   2x5 to 27\" table    Lateral heel tap    4\" 10x w/ UE support   Lateral step up  6\" up and over and down 10x2 ea LE 6\" RLE lead x10 10\" 10x2   FT retro walk   13#     FT fwd walk          PROPRIOCEPTION       SLS   2x30\" airex 30\"   R SLS w/ 4# CB into RB  Chest pass 10x2   and lateral pass x 10 ea    Wobble board  30\" x2 ea dir 45\"  ea dir  30\" x2 ea dir                        MODALITIES       [place] E-stim With quad set W/ SAQ                Other Therapeutic Activities/Education:  HEP and importance of completion    Home Exercise Program: Issued, practiced and pt demo ability to perform 8/17/2021      Manual Treatments: Modalities:       Communication with other providers:        Assessment: Pt tolerated treatment well. Pt was able to increase activity in the gym today. Pt is making gains with strength, but is still lacking terminal knee extension. Pt will continue to benefit from more strengthening.   Plan for Next Session: gait, quad strength, protocol in chart    Time In / Time Out:  0830/0910     Timed Code/Total Treatment Minutes:  40'/40'1 TE ( 10') 1 neuro (20') 1 TA (10'       Next Progress Note due:  10th visit      Plan of Care Interventions:  [x] Therapeutic Exercise  [x] Modalities:  [x] Therapeutic Activity     [] Ultrasound  [x] Estim  [x] Gait Training      [] Cervical Traction [] Lumbar Traction  [x] Neuromuscular Re-education    [x] Cold/hotpack [] Iontophoresis   [x] Instruction in HEP      [x] Vasopneumatic   [] Dry Needling    [x] Manual Therapy               [] Aquatic Therapy              Electronically signed by:  Jose Johnson PTA    9/27/2021,8:25 AM            9/27/2021,9:48 AM

## 2021-09-30 ENCOUNTER — HOSPITAL ENCOUNTER (OUTPATIENT)
Dept: PHYSICAL THERAPY | Age: 36
Setting detail: THERAPIES SERIES
Discharge: HOME | End: 2021-09-30
Payer: COMMERCIAL

## 2021-09-30 PROCEDURE — 97530 THERAPEUTIC ACTIVITIES: CPT

## 2021-09-30 PROCEDURE — 97110 THERAPEUTIC EXERCISES: CPT

## 2021-09-30 PROCEDURE — 97112 NEUROMUSCULAR REEDUCATION: CPT

## 2021-09-30 NOTE — FLOWSHEET NOTE
Outpatient Physical Therapy  [place]           [x] Phone: [phone]   Fax: [phone]  [facility]           [] Phone: [phone]   Fax: [phone]        Physical Therapy Daily Treatment Note  Date:  2021    Patient Name:  Matti Murphy    :  1985  MRN: [id number]  Restrictions/Precautions:   follow protocol  Diagnosis:   Diagnosis: Postop check. R patella tendon repair. R knee I&D  Date of Injury/Surgery: 21 repair. 21 debridment. Treatment Diagnosis: Treatment Diagnosis: RLE weakness, impaired gait    Insurance/Certification information: PT Insurance Information: UMR - 60 PCY   Referring Physician:  Referring Practitioner: DO Mateusz Duenas Doctor Visit:  10/4  Plan of care signed (Y/N):  Y  Outcome Measure: LEFS: 56/80  Visit# / total visits:  9/10  Pain level: 0/10     Goals:     Patient goals : improve mobility of right leg and get back to work  Short term goals  Time Frame for Short term goals: 5 visits  Short term goal 1: Pt will be IND with HEP in order to maximize recovery outside of clinic MET   Long term goals  Time Frame for Long term goals : 10 visits  Long term goal 1: Pt will improve R knee AROM from 0-115 to aide in stairs MET   Long term goal 2: Pt will improve gross RLE strength to 4+/5 to aide in ADLs Progressing   Long term goal 3: Pt will improve LEFS to 65 or higher to show [address] and subjective improvement Not MET   Long term goal 4: Pt will ambulate into clinic with no brace, no deviations and equal stance time Almost MET     Summary of Evaluation: Assessment: Pt is a 55-year-old male who presents to clinic following a R patellar tendon repair on 21, as well as debridement, incision and drainage of infection on 21. Upon assessment, pt presents with impaired gait, impaired balance, impaired R knee strength and ROM and overall reduced independent with ADL/ iADL and work-related tasks.  Pt would benefit from skilled therapy interventions to address listed impairments, progress toward goal completion and improve ADL/IADL status. PT also warranted to reduce risk for further injury or decline. Subjective:  Pt arrives to tx session reporting 0/10 pain today. Any changes in Ambulatory Summary Sheet? None      Objective: COVID screening questions were asked and patient attested that there had been no contact or symptoms    Challenged with SLS w/ lateral toss into RB   Challenged with lateral heel taps    Exercises: (No more than 4 columns)   Exercise/Equipment 9/22/21 #7 9/27/2021 #8 9/30/21 #9           WARM UP      Nu-step  L4 x5' L-4 x 5' L-4 x 5'   Elliptical      TABLE      SAQ  10x2 5\" 10x2 5\"   LAQ 2x10 10x2 3\"  10x2 3\"    SLR 2x10 10x2 10x2   Single leg bridge  10x2 10x2                  STANDING      STS x10  20\" stagger R behind L 10x2 20\" stagger R behind L 10x2   TKE with band       Anterior step up       Shuttle leg press   DL 4C 10x2  SL 2C 10x2 DL 4C 10x2  SL 2C 10x2   SL squat to table 2x5 to 27\" table     Lateral heel tap  4\" 10x w/ UE support 4\" 10x w/ UE support   Lateral step up 6\" RLE lead x10 10\" 10x2 10\" 10x2   FT retro walk       FT fwd walk         PROPRIOCEPTION      SLS 2x30\" airex 30\"   R SLS w/ 4# CB into RB  Chest pass 10x2   and lateral pass x 10 ea     Wobble board   30\" x2 ea dir 30\" x2 ea dir                     MODALITIES      [place] E-stim                Other Therapeutic Activities/Education:  HEP and importance of completion    Home Exercise Program: Issued, practiced and pt demo ability to perform 8/17/2021      Manual Treatments:        Modalities:       Communication with other providers:        Assessment: Pt tolerated treatment well. Pt continues to make gains in strength, still lacking terminal knee extension. Pt will continue to benefit from more strengthening.     Plan for Next Session: gait, quad strength, protocol in chart    Time In / Time Out:  1601 / 1640     Timed Code/Total Treatment Minutes:   44' / 44'    1 TE    1 TA    1 NR      Next Progress Note due:  10th visit      Plan of Care Interventions:  [x] Therapeutic Exercise  [x] Modalities:  [x] Therapeutic Activity     [] Ultrasound  [x] Estim  [x] Gait Training      [] Cervical Traction [] Lumbar Traction  [x] Neuromuscular Re-education    [x] Cold/hotpack [] Iontophoresis   [x] Instruction in HEP      [x] Vasopneumatic   [] Dry Needling    [x] Manual Therapy               [] Aquatic Therapy              Electronically signed by:  Shane Moreira PTA,  9/30/2021,2:55 PM

## 2021-10-04 ENCOUNTER — HOSPITAL ENCOUNTER (OUTPATIENT)
Dept: PHYSICAL THERAPY | Age: 36
Setting detail: THERAPIES SERIES
Discharge: HOME | End: 2021-10-04
Payer: COMMERCIAL

## 2021-10-04 ENCOUNTER — OFFICE VISIT (OUTPATIENT)
Dept: ORTHOPEDIC SURGERY | Age: 36
End: 2021-10-04

## 2021-10-04 VITALS
HEART RATE: 78 BPM | HEIGHT: 69 IN | RESPIRATION RATE: 16 BRPM | WEIGHT: 270 LBS | BODY MASS INDEX: 39.99 KG/M2 | OXYGEN SATURATION: 98 %

## 2021-10-04 DIAGNOSIS — Z09 S/P ORTHOPEDIC SURGERY, FOLLOW-UP EXAM: Primary | ICD-10-CM

## 2021-10-04 PROCEDURE — 97112 NEUROMUSCULAR REEDUCATION: CPT

## 2021-10-04 PROCEDURE — 99024 POSTOP FOLLOW-UP VISIT: CPT | Performed by: ORTHOPAEDIC SURGERY

## 2021-10-04 PROCEDURE — 97530 THERAPEUTIC ACTIVITIES: CPT

## 2021-10-04 PROCEDURE — 97110 THERAPEUTIC EXERCISES: CPT

## 2021-10-04 ASSESSMENT — ENCOUNTER SYMPTOMS
CHEST TIGHTNESS: 0
BACK PAIN: 0
COLOR CHANGE: 0

## 2021-10-04 NOTE — FLOWSHEET NOTE
Outpatient Physical Therapy  [place]           [x] Phone: [phone]   Fax: [phone]  Aure Balderrama           [] Phone: [phone]   Fax: [phone]        Physical Therapy Daily Treatment Note  Date:  10/4/2021    Patient Name:  Marya Martinez    :  1985  MRN: [id number]  Restrictions/Precautions:   follow protocol  Diagnosis:   Diagnosis: Postop check. R patella tendon repair. R knee I&D  Date of Injury/Surgery: 21 repair. 21 debridment. Treatment Diagnosis: Treatment Diagnosis: RLE weakness, impaired gait    Insurance/Certification information: PT Insurance Information: UMR - 60 PCY   Referring Physician:  Referring Practitioner: DO Mateusz Holbrook Doctor Visit:  10/4  Plan of care signed (Y/N):  Y  Outcome Measure: LEFS: 5680  Visit# / total visits:  10/18  Pain level: 0/10     Goals:     Patient goals : improve mobility of right leg and get back to work  Short term goals  Time Frame for Short term goals: 5 visits  Short term goal 1: Pt will be IND with HEP in order to maximize recovery outside of clinic MET   Long term goals  Time Frame for Long term goals : 10 visits  Long term goal 1: Pt will improve R knee AROM from 0-115 to aide in stairs MET   Long term goal 2: Pt will improve gross RLE strength to 4+/5 to aide in ADLs Progressing   Long term goal 3: Pt will improve LEFS to 65 or higher to show [address] and subjective improvement Not MET   Long term goal 4: Pt will ambulate into clinic with no brace, no deviations and equal stance time Almost MET     Summary of Evaluation: Assessment: Pt is a 66-year-old male who presents to clinic following a R patellar tendon repair on 21, as well as debridement, incision and drainage of infection on 21. Upon assessment, pt presents with impaired gait, impaired balance, impaired R knee strength and ROM and overall reduced independent with ADL/ iADL and work-related tasks.  Pt would benefit from skilled therapy interventions to address listed impairments, progress toward goal completion and improve ADL/IADL status. PT also warranted to reduce risk for further injury or decline. Subjective:  Pt arrives to tx session reporting 0/10 pain today. Pt stated that he has been working OT at work . Pt stated that he still has occasional \"bucklling\" but it has not caused him to fall. Pt stated that he is scheduled to see surgeon today. Any changes in Ambulatory Summary Sheet?   None      Objective: COVID screening questions were asked and patient attested that there had been no contact or symptoms    Pt had improved extension lag with SLR today    Pt is challenged with lateral heel tap and actually uses UE and pushes off with toes     Exercises: (No more than 4 columns)   Exercise/Equipment 9/22/21 #7 9/27/2021 #8 9/30/21 #9 10/4/2021 #10            WARM UP       Nu-step  L4 x5' L-4 x 5' L-4 x 5'    Elliptical    5'    TABLE       SAQ  10x2 5\" 10x2 5\" 10x2 5\"   LAQ 2x10 10x2 3\"  10x2 3\"     SLR 2x10 10x2 10x2 10x2   Single leg bridge  10x2 10x2 10x2   S/L hip abduction     10x2             STANDING       STS x10  20\" stagger R behind L 10x2 20\" stagger R behind L 10x2 20\" stagger R behind L 10x2   TKE with band        Anterior step up     10\" up w/ R and down with L 10x2   Shuttle leg press   DL 4C 10x2  SL 2C 10x2 DL 4C 10x2  SL 2C 10x2 DL 4C 10x2  SL 2C 10x2   SL squat to table 2x5 to 27\" table      Lateral heel tap  4\" 10x w/ UE support 4\" 10x w/ UE support 4\" 10x2w/ UE support    Lateral step up 6\" RLE lead x10 10\" 10x2 10\" 10x2 10\" 10x2   FT retro walk        FT fwd walk          PROPRIOCEPTION       SLS 2x30\" airex 30\"   R SLS w/ 4# CB into RB  Chest pass 10x2   and lateral pass x 10 ea      Wobble board   30\" x2 ea dir 30\" x2 ea dir 30\" x2 ea dir                        MODALITIES       [place] [employer]                  Other Therapeutic Activities/Education:  HEP and importance of completion    Home Exercise Program: Issued, practiced and pt demo ability to perform 8/17/2021      Manual Treatments:        Modalities:       Communication with other providers:        Assessment: Pt tolerated treatment fairly well. Pt is making gains with strength. Pt will continue to benefit from more strengthening.      Plan for Next Session: gait, quad strength, protocol in chart    Time In / Time Out:  0830/0914     Timed Code/Total Treatment Minutes:  44'/44' 1 TE ( 15') 1 TA (20') 1 neuro (9')       Next Progress Note due:  10th visit      Plan of Care Interventions:  [x] Therapeutic Exercise  [x] Modalities:  [x] Therapeutic Activity     [] Ultrasound  [x] Estim  [x] Gait Training      [] Cervical Traction [] Lumbar Traction  [x] Neuromuscular Re-education    [x] Cold/hotpack [] Iontophoresis   [x] Instruction in HEP      [x] Vasopneumatic   [] Dry Needling    [x] Manual Therapy               [] Aquatic Therapy              Electronically signed by:  Nevaeh Rahman  10/4/2021,8:28 AM     10/4/2021,2:14 PM

## 2021-10-04 NOTE — PROGRESS NOTES
Subjective:      Patient ID: Gwarmando Sandhu is a 28 y.o. male. Patient returns to the office for a 6 week post operative follow up on his right knee I&D that was performed on 8/25/21. He denies pain in the office today but does have continued buckling of the extremity at times with no falls. He continues to work with PT on ROM and strengthening of the extremity without further complication or swelling. He has ROM and stability within the extremity. No new injury reported. He comes in today for his 6-week recheck after repeat I&D of the right knee and he is now about 3-1/2 months out from his original patellar tendon repair. Overall he states that he is feeling much better and is no longer having any pain or drainage from the right knee. He states that his strength is improving but he does continue to have periodic episodes of weakness in the right knee. Patient denies any new injury to the involved extremity/ joint, denies numbness or tingling in the involved extremity and denies fever or chills. Review of Systems   Constitutional: Negative for activity change, chills and fever. Respiratory: Negative for chest tightness. Cardiovascular: Negative for chest pain. Musculoskeletal: Negative for arthralgias, back pain, gait problem, joint swelling and myalgias. Skin: Negative for color change, pallor, rash and wound. Neurological: Negative for weakness and numbness. Past Medical History:   Diagnosis Date    Hypertension        Objective:   Physical Exam  Constitutional:       Appearance: He is well-developed. HENT:      Head: Normocephalic. Eyes:      Pupils: Pupils are equal, round, and reactive to light. Pulmonary:      Effort: Pulmonary effort is normal.   Musculoskeletal:         General: No swelling, tenderness, deformity or signs of injury. Normal range of motion. Cervical back: Normal range of motion.       Right hip: Normal.      Left hip: Normal.      Right knee: No swelling, deformity, effusion, erythema, ecchymosis, lacerations or bony tenderness. Normal range of motion. No tenderness. No medial joint line, lateral joint line, MCL or LCL tenderness. No LCL laxity or MCL laxity. Normal alignment and normal patellar mobility. Left knee: Normal. No swelling, deformity, effusion, erythema, ecchymosis, lacerations or bony tenderness. Normal range of motion. No tenderness. No medial joint line, lateral joint line, MCL, LCL or patellar tendon tenderness. No LCL laxity or MCL laxity. Normal alignment and normal patellar mobility. Skin:     General: Skin is warm and dry. Capillary Refill: Capillary refill takes less than 2 seconds. Coloration: Skin is not pale. Findings: No erythema or rash. Neurological:      Mental Status: He is alert and oriented to person, place, and time. Right knee-Incision clean, dry, intact, with no erythema, no drainage, and no signs of infection. 0-140      Assessment:       Right knee I&D, 6 weeks   Right knee patellar tendon repair, 3-1/2 months      Plan:       I discussed with him today that he is progressing much better. At this point he can resume all activities with no restrictions. I discussed with the patient today that their are symptoms are normal and should improve with time. I explained to him that maximal improvement is about a year to year and a half after this type of surgery. Continue weight-bearing as tolerated. Continue range of motion exercises as instructed. Ice and elevate as needed. Tylenol or Motrin for pain. Follow up as needed.               Celia 97, DO

## 2021-10-04 NOTE — PATIENT INSTRUCTIONS
Weightbearing and activities as tolerated  May take Ibuprofen or Motrin as needed  Rest, ice, and elevate as needed  Work on ROM and strengthening exercises per PT protocol  Follow up as needed

## 2021-10-04 NOTE — PROGRESS NOTES
Patient returns to the office for a 6 week post operative follow up on his right knee I&D that was performed on 8/25/21. He denies pain in the office today but does have continued buckling of the extremity at times with no falls. He continues to work with PT on ROM and strengthening of the extremity without further complication or swelling. He has ROM and stability within the extremity. No new injury reported.

## 2021-10-07 ENCOUNTER — HOSPITAL ENCOUNTER (OUTPATIENT)
Dept: PHYSICAL THERAPY | Age: 36
Setting detail: THERAPIES SERIES
Discharge: HOME | End: 2021-10-07
Payer: COMMERCIAL

## 2021-10-07 PROCEDURE — 97110 THERAPEUTIC EXERCISES: CPT

## 2021-10-07 PROCEDURE — 97112 NEUROMUSCULAR REEDUCATION: CPT

## 2021-10-07 NOTE — FLOWSHEET NOTE
Outpatient Physical Therapy  [place]           [x] Phone: [phone]   Fax: [phone]  Margar[facility]           [] Phone: [phone]   Fax: [phone]        Physical Therapy Daily Treatment Note  Date:  10/7/2021    Patient Name:  Yovani Ley    :  1985  MRN: [id number]  Restrictions/Precautions:   follow protocol  Diagnosis:   Diagnosis: Postop check. R patella tendon repair. R knee I&D  Date of Injury/Surgery: 21 repair. 21 debridment. Treatment Diagnosis: Treatment Diagnosis: RLE weakness, impaired gait    Insurance/Certification information: PT Insurance Information: UMR - 60 PCY   Referring Physician:  Referring Practitioner: Celester Lennox, DO Next Doctor Visit:    Plan of care signed (Y/N):  Y  Outcome Measure: LEFS:   Visit# / total visits:    Pain level: 0/10     Goals:     Patient goals : improve mobility of right leg and get back to work  Short term goals  Time Frame for Short term goals: 5 visits  Short term goal 1: Pt will be IND with HEP in order to maximize recovery outside of clinic MET   Long term goals  Time Frame for Long term goals : 10 visits  Long term goal 1: Pt will improve R knee AROM from 0-115 to aide in stairs MET   Long term goal 2: Pt will improve gross RLE strength to 4+/5 to aide in ADLs Progressing   Long term goal 3: Pt will improve LEFS to 65 or higher to show [facility] and subjective improvement Not MET   Long term goal 4: Pt will ambulate into clinic with no brace, no deviations and equal stance time Almost MET     Summary of Evaluation: Assessment: Pt is a 80-year-old male who presents to clinic following a R patellar tendon repair on 21, as well as debridement, incision and drainage of infection on 21. Upon assessment, pt presents with impaired gait, impaired balance, impaired R knee strength and ROM and overall reduced independent with ADL/ iADL and work-related tasks.  Pt would benefit from skilled therapy interventions to address listed impairments, progress toward goal completion and improve ADL/IADL status. PT also warranted to reduce risk for further injury or decline. Subjective:  Pt is doing well this date. He saw his MD on Monday who released him from care and noted he could go back to running. Any changes in Ambulatory Summary Sheet? None      Objective: COVID screening questions were asked and patient attested that there had been no contact or symptoms    -very minimal quad lag w/ weight this date    Exercises: (No more than 4 columns)   Exercise/Equipment 9/30/21 #9 10/4/2021 #10 10/7/21 #11           WARM UP      Nu-step  L-4 x 5'     Elliptical  5'  x5'   TABLE      SAQ 10x2 5\" 10x2 5\"    LAQ 10x2 3\"   2x10, 1#   SLR 10x2 10x2 2x10, 1#   Single leg bridge 10x2 10x2 x10   S/L hip abduction   10x2             STANDING      STS 20\" stagger R behind L 10x2 20\" stagger R behind L 10x2    TKE with band       Anterior step up   10\" up w/ R and down with L 10x2    Shuttle leg press  DL 4C 10x2  SL 2C 10x2 DL 4C 10x2  SL 2C 10x2    SL squat to table   x10 RLE   Machines downstairs  Leg press  Leg curl  Leg ext   Press: 2x10 77#  Curl: 44# x10  Ext: 22# x10   Lateral heel tap 4\" 10x w/ UE support 4\" 10x2w/ UE support     Lateral step up 10\" 10x2 10\" 10x2    FT retro walk       FT fwd walk         PROPRIOCEPTION      SLS      Wobble board  30\" x2 ea dir 30\" x2 ea dir                      MODALITIES      [place] [employer]                Other Therapeutic Activities/Education:  HEP and importance of completion    Home Exercise Program: Issued, practiced and pt demo ability to perform 8/17/2021      Manual Treatments:        Modalities:       Communication with other providers:        Assessment: Pt tolerated today's treatment without any adverse reactions or complications this date. Pt did well with added strength exercises in gym this date.  Therapy sessions to progress to return to his IADLs such as running and lifting to ensure safe dc. Pt would continue to benefit from skilled therapy interventions to address remaining impairments, improve mobility and strength and progress toward goal completion while reducing risk for re-injury or further decline.   End pain: 0/10    Plan for Next Session: gait, quad strength, protocol in chart    Time In / Time Out:  1601 - 1640     Timed Code/Total Treatment Minutes: 44': 8' NMR x1, 31' TE x2      Next Progress Note due:  10th visit      Plan of Care Interventions:  [x] Therapeutic Exercise  [x] Modalities:  [x] Therapeutic Activity     [] Ultrasound  [x] Estim  [x] Gait Training      [] Cervical Traction [] Lumbar Traction  [x] Neuromuscular Re-education    [x] Cold/hotpack [] Iontophoresis   [x] Instruction in HEP      [x] Vasopneumatic   [] Dry Needling    [x] Manual Therapy               [] Aquatic Therapy              Electronically signed by:  Scott Renae PT, DPT  10/7/2021,8:16 AM

## 2021-10-11 ENCOUNTER — OFFICE VISIT (OUTPATIENT)
Dept: ORTHOPEDIC SURGERY | Age: 36
End: 2021-10-11

## 2021-10-11 ENCOUNTER — OFFICE VISIT (OUTPATIENT)
Dept: FAMILY MEDICINE CLINIC | Age: 36
End: 2021-10-11
Payer: COMMERCIAL

## 2021-10-11 ENCOUNTER — HOSPITAL ENCOUNTER (OUTPATIENT)
Dept: PHYSICAL THERAPY | Age: 36
Setting detail: THERAPIES SERIES
Discharge: HOME | End: 2021-10-11
Payer: COMMERCIAL

## 2021-10-11 VITALS
RESPIRATION RATE: 15 BRPM | BODY MASS INDEX: 41.03 KG/M2 | HEIGHT: 69 IN | OXYGEN SATURATION: 99 % | WEIGHT: 277 LBS | HEART RATE: 95 BPM | TEMPERATURE: 97.5 F

## 2021-10-11 VITALS
OXYGEN SATURATION: 97 % | SYSTOLIC BLOOD PRESSURE: 124 MMHG | WEIGHT: 277 LBS | BODY MASS INDEX: 41.03 KG/M2 | HEIGHT: 69 IN | DIASTOLIC BLOOD PRESSURE: 84 MMHG | TEMPERATURE: 98.2 F | HEART RATE: 85 BPM

## 2021-10-11 DIAGNOSIS — L02.419 CELLULITIS AND ABSCESS OF LEG: Primary | ICD-10-CM

## 2021-10-11 DIAGNOSIS — Z00.00 HEALTHCARE MAINTENANCE: Primary | ICD-10-CM

## 2021-10-11 DIAGNOSIS — F41.8 SITUATIONAL ANXIETY: ICD-10-CM

## 2021-10-11 DIAGNOSIS — I10 ESSENTIAL HYPERTENSION: ICD-10-CM

## 2021-10-11 DIAGNOSIS — E66.01 MORBID OBESITY WITH BMI OF 40.0-44.9, ADULT (HCC): ICD-10-CM

## 2021-10-11 DIAGNOSIS — L03.119 CELLULITIS AND ABSCESS OF LEG: Primary | ICD-10-CM

## 2021-10-11 PROCEDURE — 97016 VASOPNEUMATIC DEVICE THERAPY: CPT

## 2021-10-11 PROCEDURE — 97110 THERAPEUTIC EXERCISES: CPT

## 2021-10-11 PROCEDURE — 99024 POSTOP FOLLOW-UP VISIT: CPT | Performed by: ORTHOPAEDIC SURGERY

## 2021-10-11 PROCEDURE — 99395 PREV VISIT EST AGE 18-39: CPT | Performed by: FAMILY MEDICINE

## 2021-10-11 RX ORDER — CEPHALEXIN 250 MG/1
500 CAPSULE ORAL 4 TIMES DAILY
Qty: 112 CAPSULE | Refills: 0 | Status: ON HOLD | OUTPATIENT
Start: 2021-10-11 | End: 2021-10-15 | Stop reason: HOSPADM

## 2021-10-11 ASSESSMENT — ENCOUNTER SYMPTOMS
BACK PAIN: 0
COLOR CHANGE: 1
CHEST TIGHTNESS: 0

## 2021-10-11 NOTE — PROGRESS NOTES
10/11/2021    Froy Aj    Chief Complaint   Patient presents with    6 Month Follow-Up    Other     right knee swelling. pt has had 3 surgeries rt knee over the past year. pt is concerned possible infx       HPI    Deborah Riedel is a 28 y.o. male who presents today for wellness. Cycle year runs from September 2021 to September 2022. His 10month-old labs do not fit in that timeframe. He is willing to redo them. Patient notes he will be seeing Dr. Eder Hammer this afternoon is it appears within the last 12 hours that his incision is reddened again. This was a patellar tendon rupture. Patient's already been on courses of antibiotics.         REVIEW OF SYSTEMS    Constitutional:  Denies fever, chills, weight loss or weakness  Eyes:  no photophobia or discharge  ENT:  no sore throat or ear pain  Cardiovascular:  Denies chest pain, palpitations or swelling  Respiratory:  Denies cough or shortness of breath  GI:  no abdominal pain, nausea, vomiting, or diarrhea  Musculoskeletal:  no back pain  Skin:  No rashes  Neurologic:  no headache, focal weakness, or sensory changes  Endocrine:  no polyuria or polydipsia      PAST MEDICAL HISTORY  Past Medical History:   Diagnosis Date    Hypertension        FAMILY HISTORY  Family History   Problem Relation Age of Onset    Other Mother         Hodgekin's    Cancer Father         Testicular       SOCIAL HISTORY  Social History     Socioeconomic History    Marital status:      Spouse name: Not on file    Number of children: Not on file    Years of education: Not on file    Highest education level: Not on file   Occupational History    Not on file   Tobacco Use    Smoking status: Never Smoker    Smokeless tobacco: Former User     Types: Chew   Vaping Use    Vaping Use: Never used   Substance and Sexual Activity    Alcohol use: Yes     Comment: Occasionally    Drug use: Never    Sexual activity: Yes     Partners: Female   Other Topics Concern    Not on file Social History Narrative    Not on file     Social Determinants of Health     Financial Resource Strain: Low Risk     Difficulty of Paying Living Expenses: Not hard at all   Food Insecurity: No Food Insecurity    Worried About Running Out of Food in the Last Year: Never true    [address] in the Last Year: Never true   Transportation Needs: No Transportation Needs    Lack of Transportation (Medical): No    Lack of Transportation (Non-Medical):  No   Physical Activity:     Days of Exercise per Week:     Minutes of Exercise per Session:    Stress:     Feeling of Stress :    Social Connections:     Frequency of Communication with Friends and Family:     Frequency of Social Gatherings with Friends and Family:     Attends [employer]oration[employer] Services:     Active Member of Clubs or Organizations:     Attends Club or Organization Meetings:     Marital Status:    Intimate Partner Violence:     Fear of Current or Ex-Partner:     Emotionally Abused:     Physically Abused:     Sexually Abused:         SURGICAL HISTORY  Past Surgical History:   Procedure Laterality Date    [facility] Right 8/25/2021    RIGHT KNEE INCISION AND DRAINAGE performed by Sarah Hair DO at [address] [facility] Right 7/6/2021    RIGHT KNEE [facility] performed by Sarah Hair DO at [address] [facility] Right 6/22/2021    RIGHT PATELLA TENDON REPAIR performed by Sarah Hair DO at [address] [facility]  Current Outpatient Medications   Medication Sig Dispense Refill    fluocinonide (LIDEX) 0.05 % external solution apply to rash ON scalp twice a day if needed for psoriasis FLARES      sertraline (ZOLOFT) 50 MG tablet Take 1 tablet by mouth daily 90 tablet 1    lisinopril-hydroCHLOROthiazide (PRINZIDE;ZESTORETIC) 10-12.5 MG per tablet Take 1 tablet by mouth daily 30 tablet 5     No current facility-administered medications for this visit. ALLERGIES  No Known Allergies    PHYSICAL EXAM  /84   Pulse 85   Temp 98.2 °F (36.8 °C)   Ht 5' 9\" (1.753 m)   Wt 277 lb (125.6 kg)   SpO2 97%   BMI 40.91 kg/m²     ASSESSMENT & PLAN    1. Healthcare maintenance  Forms completed    - Lipid Panel; Future  - Glucose; Future    2. Essential hypertension  Issue controlled. Continue meds. Refilled meds. 3. Situational anxiety  Issue controlled. Continue meds. Refilled meds.     4. Morbid obesity with BMI of 40.0-44.9, adult ([facility] [address]  Continue losing weight    Follow-up 6 months    Electronically signed by Jose Gonzales MD on 10/11/2021

## 2021-10-11 NOTE — PATIENT INSTRUCTIONS
Continue weight-bearing as tolerated. Continue range of motion exercises as instructed. Ice and elevate as needed. Tylenol or Motrin for pain.   Follow up in one week

## 2021-10-11 NOTE — FLOWSHEET NOTE
Outpatient Physical Therapy  [place]           [x] Phone: [phone]   Fax: [phone]  [facility] Hall           [] Phone: [phone]   Fax: [phone]        Physical Therapy Daily Treatment Note  Date:  10/11/2021    Patient Name:  Yovani Ley    :  1985  MRN: [id number]  Restrictions/Precautions:   follow protocol  Diagnosis:   Diagnosis: Postop check. R patella tendon repair. R knee I&D  Date of Injury/Surgery: 21 repair. 21 debridment. Treatment Diagnosis: Treatment Diagnosis: RLE weakness, impaired gait    Insurance/Certification information: PT Insurance Information: UMR - 60 PCY   Referring Physician:  Referring Practitioner: Celester Lennox, DO Next Doctor Visit:    Plan of care signed (Y/N):  Y  Outcome Measure: LEFS: 5680  Visit# / total visits:    Pain level: 0/10     Goals:     Patient goals : improve mobility of right leg and get back to work  Short term goals  Time Frame for Short term goals: 5 visits  Short term goal 1: Pt will be IND with HEP in order to maximize recovery outside of clinic MET   Long term goals  Time Frame for Long term goals : 10 visits  Long term goal 1: Pt will improve R knee AROM from 0-115 to aide in stairs MET   Long term goal 2: Pt will improve gross RLE strength to 4+/5 to aide in ADLs Progressing   Long term goal 3: Pt will improve LEFS to 65 or higher to show [address] and subjective improvement Not MET   Long term goal 4: Pt will ambulate into clinic with no brace, no deviations and equal stance time Almost MET     Summary of Evaluation: Assessment: Pt is a 77-year-old male who presents to clinic following a R patellar tendon repair on 21, as well as debridement, incision and drainage of infection on 21. Upon assessment, pt presents with impaired gait, impaired balance, impaired R knee strength and ROM and overall reduced independent with ADL/ iADL and work-related tasks.  Pt would benefit from skilled therapy interventions to address listed impairments, progress toward goal completion and improve ADL/IADL status. PT also warranted to reduce risk for further injury or decline. Subjective:  Pt came in this morning reporting of no pain but did have some increase swelling yesterday. Also started noticing increase redness within the last 24 hours proximal incision where he had infection. PT notified and suggested that pt stop by surgeon's office right after today's therapy session. Any changes in Ambulatory Summary Sheet?   None      Objective: COVID screening questions were asked and patient attested that there had been no contact or symptoms    minimal quad lag w/ weight with performance of SLR    Exercises: (No more than 4 columns)   Exercise/Equipment 9/30/21 #9 10/4/2021 #10 10/7/21 #11 10/11/21 #12            WARM UP       Nu-step  L-4 x 5'      Elliptical  5'  x5'  x 5'   TABLE       SAQ 10x2 5\" 10x2 5\"     LAQ 10x2 3\"   2x10, 1# 2x10, 1#   SLR 10x2 10x2 2x10, 1# 2x10, 1#   Single leg bridge 10x2 10x2 x10 10x2   S/L hip abduction   10x2  10x2             STANDING       STS 20\" stagger R behind L 10x2 20\" stagger R behind L 10x2     TKE with band        Anterior step up   10\" up w/ R and down with L 10x2     Shuttle leg press  DL 4C 10x2  SL 2C 10x2 DL 4C 10x2  SL 2C 10x2     SL squat to table   x10 RLE x10 RLE from elevated table   Machines downstairs  Leg press  Leg curl  Leg ext   Press: 2x10 77#  Curl: 44# x10  Ext: 22# x10 Press: 2x10 77#  Curl: 44# 2x10  Ext: 22# 2x10   Lateral heel tap 4\" 10x w/ UE support 4\" 10x2w/ UE support      Lateral step up 10\" 10x2 10\" 10x2     FT retro walk        FT fwd walk          PROPRIOCEPTION       SLS       Wobble board  30\" x2 ea dir 30\" x2 ea dir                          MODALITIES       [place] E-[employer]                  Other Therapeutic Activities/Education:  HEP and importance of completion    Home Exercise Program: Issued, practiced and pt demo ability to perform 8/17/2021      Manual Treatments:        Modalities:   Patient received vasocompression x10 min on low pressure. Patient had negative skin reaction afterwards. Communication with other providers:        Assessment: Pt tolerated today's treatment without any adverse reactions or complications this date. Is to stop by surgeon's office right after today's session to see if they will take a look at his knee. Patient is concerned that his infection is returning. Pt would continue to benefit from skilled therapy interventions to address remaining impairments, improve mobility and strength and progress toward goal completion while reducing risk for re-injury or further decline.   End pain: 0/10    Plan for Next Session: gait, quad strength, protocol in chart    Time In / Time Out:  0813 - 0900     Timed Code/Total Treatment Minutes: 25/82, (2) TE, (1) vaso    Next Progress Note due:  10th visit      Plan of Care Interventions:  [x] Therapeutic Exercise  [x] Modalities:  [x] Therapeutic Activity     [] Ultrasound  [x] Estim  [x] Gait Training      [] Cervical Traction [] Lumbar Traction  [x] Neuromuscular Re-education    [x] Cold/hotpack [] Iontophoresis   [x] Instruction in HEP      [x] Vasopneumatic   [] Dry Needling    [x] Manual Therapy               [] Aquatic Therapy              Electronically signed by:  Aleena Ramírez PTA  10/11/2021,8:13 AM

## 2021-10-11 NOTE — PROGRESS NOTES
Subjective:      Patient ID: Christofer Russ is a 28 y.o. male. Brain returns to the office post-op right knee incision and drainage, DOS: 8/25/21. His incision is warm to the touch and his symptoms started 10/10/21. Brittani Black states he has no pain and has a temperature of 97.5. He comes in today for a recheck of his right knee. He states that over this past weekend he began developing increasing redness along the proximal aspect of the incision. He states that he is not having any pain with motion of the right knee I want to make sure that there is no additional infection of the right knee. Patient denies any new injury to the involved extremity/ joint, denies numbness or tingling in the involved extremity and denies fever or chills. Review of Systems   Constitutional: Negative for activity change, chills and fever. Respiratory: Negative for chest tightness. Cardiovascular: Negative for chest pain. Musculoskeletal: Negative for arthralgias, back pain, gait problem, joint swelling and myalgias. Skin: Positive for color change. Negative for pallor, rash and wound. Neurological: Negative for weakness and numbness. Past Medical History:   Diagnosis Date    Hypertension        Objective:   Physical Exam  Constitutional:       Appearance: He is well-developed. HENT:      Head: Normocephalic. Eyes:      Pupils: Pupils are equal, round, and reactive to light. Pulmonary:      Effort: Pulmonary effort is normal.   Musculoskeletal:         General: No swelling, tenderness, deformity or signs of injury. Normal range of motion. Cervical back: Normal range of motion. Right hip: Normal.      Left hip: Normal.      Right knee: Erythema present. No swelling, deformity, effusion, ecchymosis, lacerations or bony tenderness. Normal range of motion. No tenderness. No medial joint line, lateral joint line, MCL or LCL tenderness. No LCL laxity or MCL laxity.  Normal alignment and normal patellar mobility. Left knee: Normal. No swelling, deformity, effusion, erythema, ecchymosis, lacerations or bony tenderness. Normal range of motion. No tenderness. No medial joint line, lateral joint line, MCL, LCL or patellar tendon tenderness. No LCL laxity or MCL laxity. Normal alignment and normal patellar mobility. Skin:     General: Skin is warm and dry. Capillary Refill: Capillary refill takes less than 2 seconds. Coloration: Skin is not pale. Findings: Erythema present. No rash. Neurological:      Mental Status: He is alert and oriented to person, place, and time. Right knee-incision healing well, there is some erythema around the proximal 3 cm of the incision without any underlying fluctuance. No pain with range of motion of the right knee. 0-140      Assessment:       Right knee cellulitis  Right knee I&D, the weeks   Right knee patellar tendon repair, 4 months      Plan:       I reassured him that this should heal without any additional surgical treatment. I will place him on 2 weeks of oral antibiotics. Continue weight-bearing as tolerated. Continue range of motion exercises as instructed. Ice and elevate as needed. Tylenol or Motrin for pain. Follow up in 1 week for skin check.               Celia Ballesteros, DO

## 2021-10-11 NOTE — PROGRESS NOTES
Brain returns to the office post-op right knee incision and drainage, DOS: 8/25/21. His incision is warm to the touch and his symptoms started 10/10/21. Samantha Rosas states he has no pain and has a temperature of 97.5.

## 2021-10-12 ENCOUNTER — HOSPITAL ENCOUNTER (INPATIENT)
Age: 36
LOS: 1 days | Discharge: HOME | DRG: 857 | End: 2021-10-15
Attending: EMERGENCY MEDICINE | Admitting: INTERNAL MEDICINE
Payer: COMMERCIAL

## 2021-10-12 ENCOUNTER — TELEPHONE (OUTPATIENT)
Dept: ORTHOPEDIC SURGERY | Age: 36
End: 2021-10-12

## 2021-10-12 ENCOUNTER — APPOINTMENT (OUTPATIENT)
Dept: GENERAL RADIOLOGY | Age: 36
DRG: 857 | End: 2021-10-12
Payer: COMMERCIAL

## 2021-10-12 DIAGNOSIS — M25.461 SWOLLEN R KNEE: ICD-10-CM

## 2021-10-12 DIAGNOSIS — L03.115 CELLULITIS OF RIGHT KNEE: Primary | ICD-10-CM

## 2021-10-12 PROBLEM — M00.9 INFECTION OF RIGHT KNEE (HCC): Status: ACTIVE | Noted: 2021-10-12

## 2021-10-12 LAB
ANION GAP SERPL CALCULATED.3IONS-SCNC: 12 MMOL/L (ref 4–16)
BASOPHILS ABSOLUTE: 0 K/CU MM
BASOPHILS RELATIVE PERCENT: 0.3 % (ref 0–1)
BUN BLDV-MCNC: 13 MG/DL (ref 6–23)
CALCIUM SERPL-MCNC: 9 MG/DL (ref 8.3–10.6)
CHLORIDE BLD-SCNC: 104 MMOL/L (ref 99–110)
CO2: 24 MMOL/L (ref 21–32)
CREAT SERPL-MCNC: 0.9 MG/DL (ref 0.9–1.3)
DIFFERENTIAL TYPE: ABNORMAL
EOSINOPHILS ABSOLUTE: 0.2 K/CU MM
EOSINOPHILS RELATIVE PERCENT: 1.9 % (ref 0–3)
ERYTHROCYTE SEDIMENTATION RATE: 23 MM/HR (ref 0–15)
GFR AFRICAN AMERICAN: >60 ML/MIN/1.73M2
GFR NON-AFRICAN AMERICAN: >60 ML/MIN/1.73M2
GLUCOSE BLD-MCNC: 97 MG/DL (ref 70–99)
HCT VFR BLD CALC: 43.2 % (ref 42–52)
HEMOGLOBIN: 13.6 GM/DL (ref 13.5–18)
HIGH SENSITIVE C-REACTIVE PROTEIN: 7.7 MG/L
IMMATURE NEUTROPHIL %: 0.4 % (ref 0–0.43)
LACTIC ACID, SEPSIS: 0.9 MMOL/L (ref 0.5–1.9)
LYMPHOCYTES ABSOLUTE: 2.2 K/CU MM
LYMPHOCYTES RELATIVE PERCENT: 28.5 % (ref 24–44)
MCH RBC QN AUTO: 28.3 PG (ref 27–31)
MCHC RBC AUTO-ENTMCNC: 31.5 % (ref 32–36)
MCV RBC AUTO: 90 FL (ref 78–100)
MONOCYTES ABSOLUTE: 0.7 K/CU MM
MONOCYTES RELATIVE PERCENT: 9.3 % (ref 0–4)
NUCLEATED RBC %: 0 %
PDW BLD-RTO: 13.1 % (ref 11.7–14.9)
PLATELET # BLD: 339 K/CU MM (ref 140–440)
PMV BLD AUTO: 9.1 FL (ref 7.5–11.1)
POTASSIUM SERPL-SCNC: 3.8 MMOL/L (ref 3.5–5.1)
RBC # BLD: 4.8 M/CU MM (ref 4.6–6.2)
SEGMENTED NEUTROPHILS ABSOLUTE COUNT: 4.7 K/CU MM
SEGMENTED NEUTROPHILS RELATIVE PERCENT: 59.6 % (ref 36–66)
SODIUM BLD-SCNC: 140 MMOL/L (ref 135–145)
TOTAL IMMATURE NEUTOROPHIL: 0.03 K/CU MM
TOTAL NUCLEATED RBC: 0 K/CU MM
WBC # BLD: 7.9 K/CU MM (ref 4–10.5)

## 2021-10-12 PROCEDURE — 85025 COMPLETE CBC W/AUTO DIFF WBC: CPT

## 2021-10-12 PROCEDURE — 96366 THER/PROPH/DIAG IV INF ADDON: CPT

## 2021-10-12 PROCEDURE — 96367 TX/PROPH/DG ADDL SEQ IV INF: CPT

## 2021-10-12 PROCEDURE — 86141 C-REACTIVE PROTEIN HS: CPT

## 2021-10-12 PROCEDURE — 96365 THER/PROPH/DIAG IV INF INIT: CPT

## 2021-10-12 PROCEDURE — 2580000003 HC RX 258: Performed by: EMERGENCY MEDICINE

## 2021-10-12 PROCEDURE — 83605 ASSAY OF LACTIC ACID: CPT

## 2021-10-12 PROCEDURE — 80048 BASIC METABOLIC PNL TOTAL CA: CPT

## 2021-10-12 PROCEDURE — 85652 RBC SED RATE AUTOMATED: CPT

## 2021-10-12 PROCEDURE — 99285 EMERGENCY DEPT VISIT HI MDM: CPT

## 2021-10-12 PROCEDURE — G0378 HOSPITAL OBSERVATION PER HR: HCPCS

## 2021-10-12 PROCEDURE — 87040 BLOOD CULTURE FOR BACTERIA: CPT

## 2021-10-12 PROCEDURE — 6360000002 HC RX W HCPCS: Performed by: EMERGENCY MEDICINE

## 2021-10-12 PROCEDURE — 73560 X-RAY EXAM OF KNEE 1 OR 2: CPT

## 2021-10-12 RX ORDER — SODIUM CHLORIDE 0.9 % (FLUSH) 0.9 %
5-40 SYRINGE (ML) INJECTION PRN
Status: DISCONTINUED | OUTPATIENT
Start: 2021-10-12 | End: 2021-10-15 | Stop reason: HOSPADM

## 2021-10-12 RX ORDER — SODIUM CHLORIDE 0.9 % (FLUSH) 0.9 %
5-40 SYRINGE (ML) INJECTION EVERY 12 HOURS SCHEDULED
Status: DISCONTINUED | OUTPATIENT
Start: 2021-10-12 | End: 2021-10-15 | Stop reason: HOSPADM

## 2021-10-12 RX ORDER — SERTRALINE HYDROCHLORIDE 25 MG/1
50 TABLET, FILM COATED ORAL DAILY
Status: DISCONTINUED | OUTPATIENT
Start: 2021-10-13 | End: 2021-10-14

## 2021-10-12 RX ORDER — POLYETHYLENE GLYCOL 3350 17 G/17G
17 POWDER, FOR SOLUTION ORAL DAILY PRN
Status: DISCONTINUED | OUTPATIENT
Start: 2021-10-12 | End: 2021-10-15 | Stop reason: HOSPADM

## 2021-10-12 RX ORDER — SODIUM CHLORIDE 9 MG/ML
25 INJECTION, SOLUTION INTRAVENOUS PRN
Status: DISCONTINUED | OUTPATIENT
Start: 2021-10-12 | End: 2021-10-15 | Stop reason: HOSPADM

## 2021-10-12 RX ORDER — ACETAMINOPHEN 325 MG/1
650 TABLET ORAL EVERY 6 HOURS PRN
Status: DISCONTINUED | OUTPATIENT
Start: 2021-10-12 | End: 2021-10-15 | Stop reason: HOSPADM

## 2021-10-12 RX ORDER — ONDANSETRON 4 MG/1
4 TABLET, ORALLY DISINTEGRATING ORAL EVERY 8 HOURS PRN
Status: DISCONTINUED | OUTPATIENT
Start: 2021-10-12 | End: 2021-10-15 | Stop reason: HOSPADM

## 2021-10-12 RX ORDER — ONDANSETRON 2 MG/ML
4 INJECTION INTRAMUSCULAR; INTRAVENOUS EVERY 6 HOURS PRN
Status: DISCONTINUED | OUTPATIENT
Start: 2021-10-12 | End: 2021-10-15 | Stop reason: HOSPADM

## 2021-10-12 RX ORDER — LISINOPRIL AND HYDROCHLOROTHIAZIDE 12.5; 1 MG/1; MG/1
1 TABLET ORAL DAILY
Status: DISCONTINUED | OUTPATIENT
Start: 2021-10-13 | End: 2021-10-15 | Stop reason: HOSPADM

## 2021-10-12 RX ORDER — 0.9 % SODIUM CHLORIDE 0.9 %
1000 INTRAVENOUS SOLUTION INTRAVENOUS ONCE
Status: COMPLETED | OUTPATIENT
Start: 2021-10-12 | End: 2021-10-12

## 2021-10-12 RX ADMIN — SODIUM CHLORIDE 1000 ML: 9 INJECTION, SOLUTION INTRAVENOUS at 20:37

## 2021-10-12 RX ADMIN — VANCOMYCIN HYDROCHLORIDE 3000 MG: 1 INJECTION, POWDER, LYOPHILIZED, FOR SOLUTION INTRAVENOUS at 22:17

## 2021-10-12 RX ADMIN — CEFEPIME 2000 MG: 2 INJECTION, POWDER, FOR SOLUTION INTRAVENOUS at 20:37

## 2021-10-12 ASSESSMENT — ENCOUNTER SYMPTOMS
COUGH: 0
ABDOMINAL PAIN: 0
SHORTNESS OF BREATH: 0
BACK PAIN: 0
VOMITING: 0
DIARRHEA: 0
RHINORRHEA: 0
EYE PAIN: 0
NAUSEA: 0

## 2021-10-12 NOTE — TELEPHONE ENCOUNTER
Patient walked into the office today to have me look at his knee again. The redness has spread to all over his incision area and is now very warm to the touch and the knee looks like he has a lot of fluid build up in the knee. Pt would like to know what to do or if he could have another antibiotic for the knee. Pt states the redness has spread over night and is more painful today.  Please advise

## 2021-10-12 NOTE — ED PROVIDER NOTES
[address] ENCOUNTER        Pt Name: Puja Jeffery  MRN: [id number]  [place] 1985  Date of evaluation: 10/12/2021  Provider: Lobito Morris PA-C  PCP: Marty Lopez MD  Note Started: 7:34 PM EDT       I have seen and evaluated this patient with my supervising physician No att. providers found. Triage CHIEF COMPLAINT       Chief Complaint   Patient presents with    Wound Check     ongoing staph infection to right knee from previous surgery         HISTORY OF PRESENT ILLNESS   (Location/Symptom, Timing/Onset, Context/Setting, Quality, Duration, Modifying Factors, Severity)  Note limiting factors. Chief Complaint: Right knee infection    Puja Jeffery is a 28 y.o. male who presents concern for infection to his right knee. He describes a patella tendon repair approximately 4 months ago, and subsequently had a staph infection. He is currently followed by orthopedics. Patient mentions he had noticed some worsening redness and swelling around the incision site 2 days ago. Yesterday he was in therapy, and had been able to been seen by his orthopedic surgeon who placed him on cephalexin. Today he had noticed worsening redness, he contacted the orthopedics office again today, and the staff were able to reach out to his doctor, who advised ED visit for consideration for admission, IV antibiotics, and surgical washout later this week. Patient denies any fevers, chest pain, shortness of breath, injury, difficulty bending his knee, difficulty walking, recent illness      Nursing Notes were all reviewed and agreed with or any disagreements were addressed in the HPI. REVIEW OF SYSTEMS    (2-9 systems for level 4, 10 or more for level 5)     Review of Systems   Constitutional: Negative for chills and fever. HENT: Negative for congestion and rhinorrhea.     Eyes: Negative for pain and visual disturbance. Respiratory: Negative for cough and shortness of breath. Cardiovascular: Negative for chest pain and leg swelling. Gastrointestinal: Negative for abdominal pain, diarrhea, nausea and vomiting. Genitourinary: Negative for dysuria and hematuria. Musculoskeletal: Negative for back pain and myalgias. Skin: Negative for rash and wound. Neurological: Negative for dizziness and light-headedness. PAST MEDICAL HISTORY     Past Medical History:   Diagnosis Date    Hypertension        SURGICAL HISTORY     Past Surgical History:   Procedure Laterality Date    INGUINAL HERNIA REPAIR Left 1986    KNEE SURGERY Right 8/25/2021    RIGHT KNEE INCISION AND DRAINAGE performed by Magdi Romero DO at [facility] Right 7/6/2021    RIGHT KNEE DEBRIDEMENT INCISION AND DRAINAGE performed by Magdi Romero DO at [address] [place] [address] Right 6/22/2021    RIGHT PATELLA TENDON REPAIR performed by Magdi [facility]ero DO at [facility]       Previous Medications    CEPHALEXIN (KEFLEX) 250 MG CAPSULE    Take 2 capsules by mouth 4 times daily for 14 days    FLUOCINONIDE (LIDEX) 0.05 % EXTERNAL SOLUTION    apply to rash ON scalp twice a day if needed for psoriasis FLARES    LISINOPRIL-HYDROCHLOROTHIAZIDE (PRINZIDE;ZESTORETIC) 10-12.5 MG PER TABLET    Take 1 tablet by mouth daily    SERTRALINE (ZOLOFT) 50 MG TABLET    Take 1 tablet by mouth daily       ALLERGIES     Patient has no known allergies.     FAMILYHISTORY       Family History   Problem Relation Age of Onset    Other Mother         Hodghair's    Cancer Father         Testicular        SOCIAL HISTORY       Social History     Socioeconomic History    Marital status:      Spouse name: None    Number of children: None    Years of education: None    Highest education level: None   Occupational History    None   Tobacco Use    Smoking status: Never Smoker    Smokeless tobacco: Former User     Types: Chew   Vaping Use    Vaping Use: Never used   Substance and Sexual Activity    Alcohol use: Yes     Comment: Occasionally    Drug use: Never    Sexual activity: Yes     Partners: Female   Other Topics Concern    None   Social History Narrative    None     Social Determinants of Health     Financial Resource Strain: Low Risk     Difficulty of Paying Living Expenses: Not hard at all   Food Insecurity: No Food Insecurity    Worried About Running Out of Food in the Last Year: Never true    Noah of Food in the Last Year: Never true   Transportation Needs: No Transportation Needs    Lack of Transportation (Medical): No    Lack of Transportation (Non-Medical): No   Physical Activity:     Days of Exercise per Week:     Minutes of Exercise per Session:    Stress:     Feeling of Stress :    Social Connections:     Frequency of Communication with Friends and Family:     Frequency of Social Gatherings with Friends and Family:     Attends Hinduism Services:     Active Member of Clubs or Organizations:     Attends Club or Organization Meetings:     Marital Status:    Intimate Partner Violence:     Fear of Current or Ex-Partner:     Emotionally Abused:     Physically Abused:     Sexually Abused:        SCREENINGS             PHYSICAL EXAM    (up to 7 for level 4, 8 or more for level 5)     ED Triage Vitals [10/12/21 1559]   BP Temp Temp Source Pulse Resp SpO2 Height Weight   131/81 98.8 °F (37.1 °C) Oral 91 18 98 % 5' 9\" (1.753 m) 270 lb (122.5 kg)       Physical Exam  Vitals and nursing note reviewed. Constitutional:       Appearance: Normal appearance. He is well-developed. He is not ill-appearing or diaphoretic. HENT:      Head: Normocephalic and atraumatic. Right Ear: External ear normal.      Left Ear: External ear normal.      Nose: Nose normal.   Eyes:      General:         Right eye: No discharge. Left eye: No discharge.    Pulmonary:      Effort: Pulmonary effort is normal. No respiratory distress. Breath sounds: No stridor. Musculoskeletal:         General: Normal range of motion. Cervical back: Normal range of motion and neck supple. Right knee: Erythema present. Normal range of motion. No tenderness. Comments: Right anterior Knee, surgical incision scar, healing, with noted erythema and mild swelling at proximally and distally   Skin:     General: Skin is warm and dry. Coloration: Skin is not pale. Neurological:      General: No focal deficit present. Mental Status: He is alert and oriented to person, place, and time. Psychiatric:         Mood and Affect: Mood normal.         Behavior: Behavior normal.         DIAGNOSTIC RESULTS   LABS:    Labs Reviewed   CBC WITH AUTO DIFFERENTIAL - Abnormal; Notable for the following components:       Result Value    MCHC 31.5 (*)     Monocytes % 9.3 (*)     All other components within normal limits   CULTURE, BLOOD 1   CULTURE, BLOOD 2   C-REACTIVE PROTEIN   BASIC METABOLIC PANEL   LACTATE, SEPSIS   LACTATE, SEPSIS   SEDIMENTATION RATE       When ordered, only abnormal lab results are displayed. All other labs were within normal range or not returned as of this dictation. EKG: When ordered, EKG's are interpreted by the Emergency Department Physician in the absence of a cardiologist.  Please see their note for interpretation of EKG. RADIOLOGY:   Non-plain film images such as CT, Ultrasound and MRI are read by the radiologist. Plain radiographic images are visualized andpreliminarily interpreted by the  ED Provider with the below findings:        Interpretation perthe Radiologist below, if available at the time of this note:    XR KNEE RIGHT (1-2 VIEWS)   Final Result   1. Prepatellar soft tissue swelling but no acute osseous abnormality. No results found. CRITICAL CARE TIME   N/A    CONSULTS:  On-call orthopedic provider was paged and I spoke with Dr. Indira Fink.   He did advised admission to hospice as patient may need infectious disease consult. He did mention that he will touch base with Dr. Temi Escamilla to update him. EMERGENCY DEPARTMENT COURSE and DIFFERENTIAL DIAGNOSIS/MDM:   Vitals:    Vitals:    10/12/21 1559 10/12/21 2118   BP: 131/81 129/78   Pulse: 91 88   Resp: 18 17   Temp: 98.8 °F (37.1 °C)    TempSrc: Oral    SpO2: 98% 98%   Weight: 270 lb (122.5 kg)    Height: 5' 9\" (1.753 m)        Patient was given thefollowing medications:  Medications   vancomycin (VANCOCIN) 3,000 mg in dextrose 5 % 500 mL IVPB (has no administration in time range)   0.9 % sodium chloride bolus (1,000 mLs IntraVENous New Bag 10/12/21 2037)   cefepime (MAXIPIME) 2000 mg IVPB minibag (0 mg IntraVENous Stopped 10/12/21 2120)     ED Course as of Oct 12 2132   Tue Oct 12, 2021   2048 CRP, High Sensitivity: 7.7 [GA]      ED Course User Index  [GA] Kayden Hammer PA-C        Pt presents with above HPI. On examination, patient has noted surgical incision appears to be well-healed. There is some noted erythema on the proximal and distal ends of the incision, there is no drainage or discharge, mild swelling, knee extension and flexion is intact, no lymphogenic streaking. Normal distal pulses, sensation intact. Vitals today wnl. Pt has h/o of reoccurring knee infections. He describes being advised by Dr. Juana Lozoya office to come to the ED for likely admission and surgical treatment on Thursday. Pt discussed with attending Dr. Marysol Snow who also had face time with patient. Plan: labs, ortho consult, admission. Ortho consulted, and advised for admission to hospitalist.  I spoke with hospitalist who accepted patient. FINAL IMPRESSION      1. Cellulitis of right knee          DISPOSITION/PLAN   DISPOSITION Decision To Admit 10/12/2021 09:06:18 PM      PATIENT REFERREDTO:  No follow-up provider specified.     DISCHARGE MEDICATIONS:  New Prescriptions    No medications on file       DISCONTINUED MEDICATIONS:  Discontinued Medications    No medications on file              (Please note that portions ofthis note were completed with a voice recognition program.  Efforts were made to edit the dictations but occasionally words are mis-transcribed.)    Anna Marie De Luna PA-C (electronically signed)            Anna Marie De Luna PA-C  10/12/21 7965

## 2021-10-12 NOTE — LETTER
[address] [facility] [address]  [address] [address]  Phone: [phone]             October 15, 2021    Patient: Abhijit Winchester   YOB: 1985   Date of Visit: 10/12/2021       To Whom It May Concern:    Grecia Raymundo was seen and treated in our facility  beginning 10/12/2021 until 10/15/2021. He may return to work on 10/17/2021.       Sincerely,       Nohemi Woodruff RN         Signature:__________________________________

## 2021-10-13 LAB
ALBUMIN SERPL-MCNC: 4 GM/DL (ref 3.4–5)
ALP BLD-CCNC: 61 IU/L (ref 40–128)
ALT SERPL-CCNC: 24 U/L (ref 10–40)
ANION GAP SERPL CALCULATED.3IONS-SCNC: 8 MMOL/L (ref 4–16)
AST SERPL-CCNC: 18 IU/L (ref 15–37)
BASOPHILS ABSOLUTE: 0 K/CU MM
BASOPHILS RELATIVE PERCENT: 0.3 % (ref 0–1)
BILIRUB SERPL-MCNC: 0.4 MG/DL (ref 0–1)
BUN BLDV-MCNC: 11 MG/DL (ref 6–23)
CALCIUM SERPL-MCNC: 8.8 MG/DL (ref 8.3–10.6)
CHLORIDE BLD-SCNC: 104 MMOL/L (ref 99–110)
CO2: 27 MMOL/L (ref 21–32)
CREAT SERPL-MCNC: 0.8 MG/DL (ref 0.9–1.3)
DIFFERENTIAL TYPE: ABNORMAL
EOSINOPHILS ABSOLUTE: 0.1 K/CU MM
EOSINOPHILS RELATIVE PERCENT: 1.7 % (ref 0–3)
GFR AFRICAN AMERICAN: >60 ML/MIN/1.73M2
GFR NON-AFRICAN AMERICAN: >60 ML/MIN/1.73M2
GLUCOSE BLD-MCNC: 99 MG/DL (ref 70–99)
HCT VFR BLD CALC: 39.7 % (ref 42–52)
HEMOGLOBIN: 12.5 GM/DL (ref 13.5–18)
IMMATURE NEUTROPHIL %: 0.1 % (ref 0–0.43)
LYMPHOCYTES ABSOLUTE: 1.8 K/CU MM
LYMPHOCYTES RELATIVE PERCENT: 25.3 % (ref 24–44)
MCH RBC QN AUTO: 28.4 PG (ref 27–31)
MCHC RBC AUTO-ENTMCNC: 31.5 % (ref 32–36)
MCV RBC AUTO: 90.2 FL (ref 78–100)
MONOCYTES ABSOLUTE: 0.7 K/CU MM
MONOCYTES RELATIVE PERCENT: 9.9 % (ref 0–4)
NUCLEATED RBC %: 0 %
PDW BLD-RTO: 13.1 % (ref 11.7–14.9)
PLATELET # BLD: 281 K/CU MM (ref 140–440)
PMV BLD AUTO: 9 FL (ref 7.5–11.1)
POTASSIUM SERPL-SCNC: 4.2 MMOL/L (ref 3.5–5.1)
RBC # BLD: 4.4 M/CU MM (ref 4.6–6.2)
SARS-COV-2, NAAT: NOT DETECTED
SEGMENTED NEUTROPHILS ABSOLUTE COUNT: 4.4 K/CU MM
SEGMENTED NEUTROPHILS RELATIVE PERCENT: 62.7 % (ref 36–66)
SODIUM BLD-SCNC: 139 MMOL/L (ref 135–145)
TOTAL IMMATURE NEUTOROPHIL: 0.01 K/CU MM
TOTAL NUCLEATED RBC: 0 K/CU MM
TOTAL PROTEIN: 6.9 GM/DL (ref 6.4–8.2)
WBC # BLD: 7 K/CU MM (ref 4–10.5)

## 2021-10-13 PROCEDURE — 87635 SARS-COV-2 COVID-19 AMP PRB: CPT

## 2021-10-13 PROCEDURE — 96366 THER/PROPH/DIAG IV INF ADDON: CPT

## 2021-10-13 PROCEDURE — 6370000000 HC RX 637 (ALT 250 FOR IP): Performed by: FAMILY MEDICINE

## 2021-10-13 PROCEDURE — 96367 TX/PROPH/DG ADDL SEQ IV INF: CPT

## 2021-10-13 PROCEDURE — 99024 POSTOP FOLLOW-UP VISIT: CPT | Performed by: ORTHOPAEDIC SURGERY

## 2021-10-13 PROCEDURE — G0378 HOSPITAL OBSERVATION PER HR: HCPCS

## 2021-10-13 PROCEDURE — 6360000002 HC RX W HCPCS: Performed by: FAMILY MEDICINE

## 2021-10-13 PROCEDURE — 2580000003 HC RX 258: Performed by: FAMILY MEDICINE

## 2021-10-13 PROCEDURE — 85025 COMPLETE CBC W/AUTO DIFF WBC: CPT

## 2021-10-13 PROCEDURE — 80053 COMPREHEN METABOLIC PANEL: CPT

## 2021-10-13 RX ORDER — VANCOMYCIN 1.25 G/250ML
1750 INJECTION, SOLUTION INTRAVENOUS EVERY 12 HOURS
Status: DISCONTINUED | OUTPATIENT
Start: 2021-10-13 | End: 2021-10-14

## 2021-10-13 RX ADMIN — PIPERACILLIN SODIUM AND TAZOBACTAM SODIUM 3375 MG: 3; .375 INJECTION, POWDER, LYOPHILIZED, FOR SOLUTION INTRAVENOUS at 02:40

## 2021-10-13 RX ADMIN — VANCOMYCIN 1750 MG: 1.25 INJECTION, SOLUTION INTRAVENOUS at 12:27

## 2021-10-13 RX ADMIN — VANCOMYCIN 1750 MG: 1.25 INJECTION, SOLUTION INTRAVENOUS at 21:29

## 2021-10-13 RX ADMIN — PIPERACILLIN SODIUM AND TAZOBACTAM SODIUM 3375 MG: 3; .375 INJECTION, POWDER, LYOPHILIZED, FOR SOLUTION INTRAVENOUS at 23:58

## 2021-10-13 RX ADMIN — PIPERACILLIN SODIUM AND TAZOBACTAM SODIUM 3375 MG: 3; .375 INJECTION, POWDER, LYOPHILIZED, FOR SOLUTION INTRAVENOUS at 08:11

## 2021-10-13 RX ADMIN — SODIUM CHLORIDE, PRESERVATIVE FREE 10 ML: 5 INJECTION INTRAVENOUS at 21:30

## 2021-10-13 RX ADMIN — ENOXAPARIN SODIUM 40 MG: 40 INJECTION SUBCUTANEOUS at 08:22

## 2021-10-13 RX ADMIN — SERTRALINE 50 MG: 25 TABLET, FILM COATED ORAL at 08:22

## 2021-10-13 RX ADMIN — PIPERACILLIN SODIUM AND TAZOBACTAM SODIUM 3375 MG: 3; .375 INJECTION, POWDER, LYOPHILIZED, FOR SOLUTION INTRAVENOUS at 17:12

## 2021-10-13 RX ADMIN — LISINOPRIL AND HYDROCHLOROTHIAZIDE 1 TABLET: 12.5; 1 TABLET ORAL at 09:57

## 2021-10-13 ASSESSMENT — ENCOUNTER SYMPTOMS
CHEST TIGHTNESS: 0
COLOR CHANGE: 1
BACK PAIN: 0

## 2021-10-13 NOTE — ED PROVIDER NOTES
I independently examined and evaluated Elba Villa. In brief their history revealed gentleman with redness, swelling to right knee. Patient states he had patellar tendon surgery initially in June however developed a staph infection and required a second surgery on July 6 and a third surgery on August 26. States is been going to physical therapy. States he noticed increasing redness and swelling to his knee yesterday after physical therapy so walked over to the orthopedic office in which he was seen by the MA who had noted redness had spread to his entire incision and is warm to the touch with fluid buildup in his knee. She spoke with Dr. Castillo Collazo who stated patient should come to the emergency department to be admitted for IV antibiotics and he would reassess him to see if he needs a repeat I&D on Thursday. Patient states he is not currently having any pain. Denies any fevers. States the incision is healed however has gotten red, warm and swollen, able to bend and flex his right knee, pulses strong to lower leg. . All diagnostic, treatment, and disposition decisions were made by myself in conjunction with the mid-level provider. Before disposition, questions were sought and answered with the patient. They have voiced understanding and agree with the plan: Patient is normotensive. Afebrile. Heart in the 90s. Sats normal.  Started patient on IV fluids as well as cefepime and vancomycin. . CBC shows a white count of 7.9. Hemoglobin of 13.6. BMP normal.. CRP elevated at 7.7. [address] [place] xr shows soft tissue swelling but no bony abnormality. Dr. Machado Leader was on-call tonight for orthopedics. He did ask for patient to be admitted to the hospitalist and possible infectious disease consult. Hospitalist agreed to this plan. For all further details of the patient's emergency department visit, please see the mid-level practitioner's documentation.         XR KNEE RIGHT (1-2 VIEWS) (Final result)  Result time 10/12/21 20:45:09  Procedure changed from XR KNEE RIGHT (3 VIEWS)  Final result by Nelia Dunn MD (10/12/21 20:45:09)                Impression:    1. Prepatellar soft tissue swelling but no acute osseous abnormality. Narrative:    EXAMINATION:   TWO XRAY VIEWS OF THE RIGHT KNEE     10/12/2021 8:30 pm     COMPARISON:   07/03/2021. HISTORY:   ORDERING SYSTEM PROVIDED HISTORY: hx patellar tendon surgery in june, has had   several staph infections, now with knee swelling, redness,   TECHNOLOGIST PROVIDED HISTORY:   Reason for exam:->hx patellar tendon surgery in june, has had several staph   infections, now with knee swelling, redness,   Reason for Exam: hx patellar tendon surgery in june, has had several staph   infections, now with knee swelling, redness,   Acuity: Acute   Type of Exam: Initial     FINDINGS:   The bone mineralization is within normal limits.  The joint spaces appear   unremarkable.  No acute fractures or dislocations are seen. Lamona Indira is   prepatellar soft tissue swelling.                     Steven Montalvo MD  10/12/21 3027

## 2021-10-13 NOTE — PROGRESS NOTES
Patient here in Obs 4 from ER, report given by nurse at bedside. A+Ox4, denies pain currenlty. Here for redness and swelling to right knee post patellar surgery and two previous post infections. Ortho consult for this am. Given gown, introduced to room, call light in reach, assessment completed, bed in low position, SR up x1.

## 2021-10-13 NOTE — PROGRESS NOTES
.        Hospitalist Progress Note      Name:  Elba Villa /Age/Sex: 1985  (28 y.o. male)   MRN & CSN:  [id number] & [id number] Admission Date/Time: 10/12/2021  6:54 PM   Location:  OBS  PCP: Waqar Myrick MD         Hospital Day: 2    Assessment and Plan:   Elba Villa is a 28 y.o.  male  who presents with <principal problem not specified>    1. Right knee infection   1. Right patellar tendon repair in 2021  2. 2 previous infections in the post op site  3. Started cephalexin on 10/11/2021  4. Sent to Er from Dr Merissa Mckeon office for IV Atb therapy 10/12  5. Vancomycin and zosyn started yesterday  6. Right knee Xr (10/12) shows prepatellar soft tissue swelling but no acute osseous abnormality  7. Dr Cynthia Sumner surgery tomorrow.      2.    Essential Hypertension~controlled         1. Continue lisinopril-hctz     Diet ADULT DIET; Regular  Diet NPO   DVT Prophylaxis [] Lovenox, []  Heparin, [] SCDs, [] Ambulation   GI Prophylaxis [] PPI,  [] H2 Blocker,  [] Carafate,  [] Diet/Tube Feeds   Code Status Full Code   Disposition Patient requires continued admission due to    MDM [] Low, [] Moderate,[]  High  Patient's risk as above due to      History of Present Illness:     Chief Complaint: <principal problem not specified>  Elba Villa is a 28 y.o.  male  who presented with right knee redness. Denies pain, fever, chills, nausea, vomiting       Ten point ROS reviewed negative, unless as noted above    Objective: Intake/Output Summary (Last 24 hours) at 10/13/2021 1844  Last data filed at 10/13/2021 1010  Gross per 24 hour   Intake 400 ml   Output    Net 400 ml      Vitals:   Vitals:    10/13/21 1513   BP: 136/80   Pulse: 84   Resp: 18   Temp: 98.7 °F (37.1 °C)   SpO2:      Physical Exam:   GEN Awake male, sitting upright in bed in no apparent distress. Appears given age. EYES Pupils are equally round. No scleral erythema, discharge, or conjunctivitis.   HENT Mucous membranes are moist. Oral pharynx without exudates, no evidence of thrush. NECK Supple, no apparent thyromegaly or masses. RESP Clear to auscultation, no wheezes, rales or rhonchi. Symmetric chest movement while on room air. CARDIO/VASC S1/S2 auscultated. Regular rate without appreciable murmurs, rubs, or gallops. No JVD or carotid bruits. Peripheral pulses equal bilaterally and palpable. No peripheral edema. GI Abdomen is soft without significant tenderness, masses, or guarding. Bowel sounds are normoactive. Rectal exam deferred.  No costovertebral angle tenderness. Normal appearing external genitalia. Locke catheter is not present. HEME/LYMPH No palpable cervical lymphadenopathy and no hepatosplenomegaly. No petechiae or ecchymoses. MSK Right knee erythema   SKIN Normal coloration, warm, dry. NEURO Cranial nerves appear grossly intact, normal speech, no lateralizing weakness. PSYCH Awake, alert, oriented x 4. Affect appropriate.     Medications:   Medications:    vancomycin  1,750 mg IntraVENous Q12H    lisinopril-hydroCHLOROthiazide  1 tablet Oral Daily    sertraline  50 mg Oral Daily    sodium chloride flush  5-40 mL IntraVENous 2 times per day    enoxaparin  40 mg SubCUTAneous Daily    piperacillin-tazobactam  3,375 mg IntraVENous Q8H      Infusions:    sodium chloride       PRN Meds: sodium chloride flush, 5-40 mL, PRN  sodium chloride, 25 mL, PRN  ondansetron, 4 mg, Q8H PRN   Or  ondansetron, 4 mg, Q6H PRN  polyethylene glycol, 17 g, Daily PRN  acetaminophen, 650 mg, Q6H PRN   Or  acetaminophen, 650 mg, Q6H PRN          Electronically signed by Deb Abel MD on 10/13/2021 at 6:44 PM

## 2021-10-13 NOTE — CARE COORDINATION
CM into see and pt is sleeping. [address]  1030 CM into see pt to initiate a safe discharge plan. Cm introduced self and explained role of CM. Pt is kind, alert and oriented. Pt lives in a one floor plan with his wife. Pt works full time is independent for all ADL's. Pt is able to drive. Pt has a PCP. Pt has insurance and is able to obtain his medications. Pt has been on IV antibiotics in the past. Pt used [facility] in the past.. CM informed pt that CM team will follow for discharge needs. Discharge plan at this time is for pt to return home with his wife. Pending determination/ needs post surgery on 10/14. CM provided card and encouraged to call for any needs or concern. CM is available if any needs arise.

## 2021-10-13 NOTE — H&P
History and Physical      Name:  Dianna Schwab /Age/Sex: 1985  (28 y.o. male)   MRN & CSN:  [id number] & [id number] Admission Date/Time: 10/12/2021  6:54 PM   Location:  ED11/ED-11 PCP: Luz Marina Somers MD       Hospital Day: 1    Assessment and Plan:   Dianna Schwab is a 28 y.o.  male  who presents with right knee infection    1. Right knee infection   1. Right patellar tendon repair in 2021  2. 2 previous infections in the post op site  3. Started cephalexin on 10/11/2021  4. Sent to Er from Dr Bina Zelaya office for IV Atb therapy 10/12  5. Vancomycin and zosyn ordered  6. Right knee Xr (10/12) shows prepatellar soft tissue swelling but no acute osseous abnormality  7. Consult dr Madhu Atkins for rec's     2. Essential Hypertension~controlled         1. Continue lisinopril-hctz     Diet No diet orders on file   DVT Prophylaxis [x] Lovenox, []  Heparin, [] SCDs, [] Ambulation   GI Prophylaxis [] PPI,  [] H2 Blocker,  [] Carafate,  [x] Diet/Tube Feeds   Code Status Prior   Disposition Patient requires continued admission due to knee infection post operatively      History of Present Illness:     Chief Complaint: right knee infection   Dianna Schwab is a 28 y.o.  male  who presents with swelling and redness to the right knee. Pt states that he had a patellar tendon repair in 2021 and has since had two staph infections and noticed his right knee swelling up on Monday at therapy. Pt states that he was started on Cephalexin yesterday but noticed redness developing at the bottom of the incision today so he went to Dr Manju Randolph office and was advised to go to the Er for IV ATB therapy. Pt denies any new trauma to the knee. Pt denies any pain in the knee. Pt denies any other symptoms.         Ten point ROS reviewed negative, unless as noted above    Objective:   No intake or output data in the 24 hours ending 10/12/21 2232   Vitals:   Vitals:    10/12/21 2215   BP:    Pulse: 88   Resp: Temp:    SpO2:      Physical Exam:   GEN Awake male, sitting upright in bed in no apparent distress. Appears given age. EYES Pupils are equally round. No scleral erythema, discharge, or conjunctivitis. HENT Mucous membranes are moist. Oral pharynx without exudates, no evidence of thrush. NECK Supple, no apparent thyromegaly or masses. RESP Clear to auscultation, no wheezes, rales or rhonchi. Symmetric chest movement while on room air. CARDIO/VASC S1/S2 auscultated. Regular rate without appreciable murmurs, rubs, or gallops. No JVD or carotid bruits. Peripheral pulses equal bilaterally and palpable. No peripheral edema. GI Abdomen is soft without significant tenderness, masses, or guarding. Bowel sounds are normoactive. Rectal exam deferred.  No costovertebral angle tenderness. Locke catheter is not present. HEME/LYMPH No palpable cervical lymphadenopathy and no hepatosplenomegaly. No petechiae or ecchymoses. MSK No gross joint deformities. SKIN Normal coloration, warm, dry. Redness at the top and bottom of the incision site on the right knee   NEURO Cranial nerves appear grossly intact, normal speech, no lateralizing weakness. PSYCH Awake, alert, oriented x 4. Affect appropriate. Past Medical History:      Past Medical History:   Diagnosis Date    Hypertension      PSHX:  has a past surgical history that includes Inguinal hernia repair (Left, 1986); Patellar tendon repair (Right, 6/22/2021); Leg Surgery (Right, 7/6/2021); and knee surgery (Right, 8/25/2021). Allergies: No Known Allergies    FAM HX: family history includes Cancer in his father; Other in his mother.   Soc HX:   Social History     Socioeconomic History    Marital status:      Spouse name: None    Number of children: None    Years of education: None    Highest education level: None   Occupational History    None   Tobacco Use    Smoking status: Never Smoker    Smokeless tobacco: Former User     Types: [employer] Use    Vaping Use: Never used   Substance and Sexual Activity    Alcohol use: Yes     Comment: Occasionally    Drug use: Never    Sexual activity: Yes     Partners: Female   Other Topics Concern    None   Social History Narrative    None     Social Determinants of Health     Financial Resource Strain: Low Risk     Difficulty of Paying Living Expenses: Not hard at all   Food Insecurity: No Food Insecurity    Worried About Running Out of Food in the Last Year: Never true    Noah of Food in the Last Year: Never true   Transportation Needs: No Transportation Needs    Lack of Transportation (Medical): No    Lack of Transportation (Non-Medical):  No   Physical Activity:     Days of Exercise per Week:     Minutes of Exercise per Session:    Stress:     Feeling of Stress :    Social Connections:     Frequency of Communication with Friends and Family:     Frequency of Social Gatherings with Friends and Family:     Attends Orthodoxy Services:     Active Member of Clubs or Organizations:     Attends Club or Organization Meetings:     Marital Status:    Intimate Partner Violence:     Fear of Current or Ex-Partner:     Emotionally Abused:     Physically Abused:     Sexually Abused:        Medications:   Medications:    vancomycin  3,000 mg IntraVENous Once      Infusions:   PRN Meds:        Electronically signed by YOSELIN Gonzalez CNP on 10/12/2021 at 10:32 PM

## 2021-10-13 NOTE — CONSULTS
Inpatient consult to Orthopedic Surgery  Consult performed by: Liz Leiva DO  Consult ordered by: YOSELIN Nielson - CNP  Reason for consult: Right knee infection  Assessment/Recommendations:     Right knee prepatellar cellulitis, possible abscess    I discussed with him today his x-ray findings. I explained to him that given his progression of the cellulitis despite oral antibiotics we will continue IV antibiotics and likely plan on proceeding to surgery tomorrow for I&D of the right knee. I explained risks, benefits, possible complications of the procedure and answered all questions for the patient. I explained postoperative rehabilitation protocol and expectations with the patient today. The patient understands and consents to the procedure. Continue weight-bearing as tolerated. Continue range of motion exercises as instructed. Ice and elevate as needed. Tylenol or Motrin for pain. He will be kept n.p.o. after midnight tonight for planned surgical treatment tomorrow. Arabella Hubbard is a 55-year-old male who is undergone multiple surgeries to the right knee for patellar tendon rupture as well as multiple I&D's. He was progressing well recently have placed him on oral antibiotics and despite that he had progression of the redness and pain around his right knee. He was subsequently mated to the hospitalist for medical management and I was consulted for further evaluation of his right knee. He states that the redness has expanded but it has decreased since he has been on IV antibiotics overnight. He is having more tenderness along the anterior aspect of the right knee but has no pain with range of motion of the right knee. Patient denies any new injury to the involved extremity/ joint, denies numbness or tingling in the involved extremity and denies fever or chills. Review of Systems   Constitutional: Negative for activity change, chills and fever.    Respiratory: Negative for chest tightness. Cardiovascular: Negative for chest pain. Musculoskeletal: Positive for joint swelling. Negative for arthralgias, back pain, gait problem and myalgias. Skin: Positive for color change. Negative for pallor, rash and wound. Neurological: Negative for weakness and numbness. Past Medical History:   Diagnosis Date    Hypertension      No current facility-administered medications on file prior to encounter. Current Outpatient Medications on File Prior to Encounter   Medication Sig Dispense Refill    cephALEXin (KEFLEX) 250 MG capsule Take 2 capsules by mouth 4 times daily for 14 days 112 capsule 0    fluocinonide (LIDEX) 0.05 % external solution apply to rash ON scalp twice a day if needed for psoriasis FLARES      sertraline (ZOLOFT) 50 MG tablet Take 1 tablet by mouth daily 90 tablet 1    lisinopril-hydroCHLOROthiazide (PRINZIDE;ZESTORETIC) 10-12.5 MG per tablet Take 1 tablet by mouth daily 30 tablet 5     No Known Allergies  Past Surgical History:   Procedure Laterality Date    INGUINAL HERNIA REPAIR Left 1986    KNEE SURGERY Right 8/25/2021    RIGHT KNEE INCISION AND DRAINAGE performed by [place] DO at [address] [facility] Right 7/6/2021    RIGHT KNEE DEBRIDEMENT INCISION AND DRAINAGE performed by [place] DO at [address] [facility] Right 6/22/2021    RIGHT PATELLA TENDON REPAIR performed by [place] DO at [address] [facility] OR     Social History     Tobacco Use    Smoking status: Never Smoker    Smokeless tobacco: Former User     Types: Chew   Vaping Use    Vaping Use: Never used   Substance Use Topics    Alcohol use: Yes     Comment: Occasionally    Drug use: Never     Family History   Problem Relation Age of Onset    Other Mother         Hodghair's    Cancer Father         Testicular       Right Knee Exam     Tenderness   The patient is experiencing tenderness in the patella and patellar tendon.     Range of Motion   The patient has normal right knee ROM. Other   Erythema: present  Sensation: normal  Pulse: present  Swelling: none  Effusion: no effusion present    Comments:  Right knee-well-healed surgical scar, there are 2 areas of erythema and induration along the proximal and distal aspect of the incision, palpable fluid collection along the distal incision, both areas are moderately tender to palpation particularly in the inferior fluid collection. No pain with range of motion of the right knee. +2 DP  Compartment soft. Left Knee Exam     Tenderness   The patient is experiencing no tenderness. Range of Motion   The patient has normal left knee ROM. Other   Erythema: absent  Sensation: normal  Pulse: present  Swelling: none  Effusion: no effusion present      Right Hip Exam   Right hip exam is normal.     Tenderness   The patient is experiencing no tenderness. Range of Motion   The patient has normal right hip ROM. Muscle Strength   The patient has normal right hip strength. Other   Erythema: absent  Sensation: normal  Pulse: present      Left Hip Exam   Left hip exam is normal.    Tenderness   The patient is experiencing no tenderness. Range of Motion   The patient has normal left hip ROM. Muscle Strength   The patient has normal left hip strength. Other   Erythema: absent  Sensation: normal  Pulse: present            BP (!) 146/77   Pulse 82   Temp 97.6 °F (36.4 °C) (Oral)   Resp 16   Ht 5' 9\" (1.753 m)   Wt 270 lb (122.5 kg)   SpO2 96%   BMI 39.87 kg/m²     Lab Results   Component Value Date    WBC 7.9 10/12/2021    HGB 13.6 10/12/2021    HCT 43.2 10/12/2021    MCV 90.0 10/12/2021     10/12/2021       XR KNEE RIGHT (1-2 VIEWS)    Result Date: 10/12/2021  EXAMINATION: TWO XRAY VIEWS OF THE RIGHT KNEE 10/12/2021 8:30 pm COMPARISON: 07/03/2021.  HISTORY: ORDERING SYSTEM PROVIDED HISTORY: hx patellar tendon surgery in june, has had several staph infections, now with knee swelling, redness, TECHNOLOGIST PROVIDED HISTORY: Reason for exam:->hx patellar tendon surgery in june, has had several staph infections, now with knee swelling, redness, Reason for Exam: hx patellar tendon surgery in june, has had several staph infections, now with knee swelling, redness, Acuity: Acute Type of Exam: Initial FINDINGS: The bone mineralization is within normal limits. The joint spaces appear unremarkable. No acute fractures or dislocations are seen. There is prepatellar soft tissue swelling. 1. Prepatellar soft tissue swelling but no acute osseous abnormality.

## 2021-10-13 NOTE — PROGRESS NOTES
[facility]  consulted by YOSELIN Cunningham CNP for monitoring and adjustment. Indication for treatment: Surgical site infection  Goal AUC: < 15  Goal trough: 10 - 15 mcg/mL    Pertinent Laboratory Values:   Temp Readings from Last 3 Encounters:   10/13/21 97.5 °F (36.4 °C) (Oral)   10/11/21 97.5 °F (36.4 °C)   10/11/21 98.2 °F (36.8 °C)     Recent Labs     10/12/21  2000   WBC 7.9     Recent Labs     10/12/21  2000   BUN 13   CREATININE 0.9     Estimated Creatinine Clearance: 148 mL/min (based on SCr of 0.9 mg/dL). No intake or output data in the 24 hours ending 10/13/21 0654    Pertinent Cultures:  Date    Source    Results  10/12   Blood    Collected           Assessment:  WBC WNL at 7.9; Afebrile  SCr = 0.9, BUN = 13, No I/O data  Day(s) of therapy: 1  Vancomycin concentration:   10/14 - To be collected    Plan:  Vancomycin 3,000 mg IV given in ED; Follow with Vancomycin 1,750 mg IV Q 12 Hours  Predicted AUC: 433; Predicted Trough: 10.9  Pharmacy will continue to monitor patient and adjust therapy as indicated    VANCOMYCIN CONCENTRATION SCHEDULED FOR 10/14/2021 @ 9 AM    Thank you for the consult.   Douglas Jeninngs [facility]   10/13/2021 6:54 AM

## 2021-10-13 NOTE — ED NOTES
Bed: H-07  Expected date:   Expected time:   Means of arrival:   Comments:  [id number] [place], RN  10/13/21 0025

## 2021-10-13 NOTE — ED NOTES
Admitting NP at bedside     Franki Marcelo, [facility]  10/12/21 1789

## 2021-10-13 NOTE — PROGRESS NOTES
Dr. Rylan Palmer consulted at this time on perfect serve for infectious disease for pt.  Waiting on response at this time

## 2021-10-13 NOTE — PLAN OF CARE
Problem: Discharge Planning:  Goal: Discharged to appropriate level of care  Description: Discharged to appropriate level of care  Outcome: Ongoing     Problem: Infection - Surgical Site:  Goal: Will show no infection signs and symptoms  Description: Will show no infection signs and symptoms  Outcome: Ongoing

## 2021-10-14 ENCOUNTER — ANESTHESIA EVENT (OUTPATIENT)
Dept: OPERATING ROOM | Age: 36
DRG: 857 | End: 2021-10-14
Payer: COMMERCIAL

## 2021-10-14 ENCOUNTER — ANESTHESIA (OUTPATIENT)
Dept: OPERATING ROOM | Age: 36
DRG: 857 | End: 2021-10-14
Payer: COMMERCIAL

## 2021-10-14 VITALS
DIASTOLIC BLOOD PRESSURE: 83 MMHG | SYSTOLIC BLOOD PRESSURE: 126 MMHG | TEMPERATURE: 97.7 F | OXYGEN SATURATION: 100 % | RESPIRATION RATE: 4 BRPM

## 2021-10-14 PROBLEM — M00.9 CHRONIC INFECTION OF RIGHT KNEE (HCC): Status: ACTIVE | Noted: 2021-10-14

## 2021-10-14 PROCEDURE — 2580000003 HC RX 258: Performed by: FAMILY MEDICINE

## 2021-10-14 PROCEDURE — 3600000012 HC SURGERY LEVEL 2 ADDTL 15MIN: Performed by: ORTHOPAEDIC SURGERY

## 2021-10-14 PROCEDURE — 99223 1ST HOSP IP/OBS HIGH 75: CPT | Performed by: INTERNAL MEDICINE

## 2021-10-14 PROCEDURE — 2500000003 HC RX 250 WO HCPCS: Performed by: NURSE ANESTHETIST, CERTIFIED REGISTERED

## 2021-10-14 PROCEDURE — 2709999900 HC NON-CHARGEABLE SUPPLY: Performed by: ORTHOPAEDIC SURGERY

## 2021-10-14 PROCEDURE — 6360000002 HC RX W HCPCS: Performed by: FAMILY MEDICINE

## 2021-10-14 PROCEDURE — 87205 SMEAR GRAM STAIN: CPT

## 2021-10-14 PROCEDURE — 6370000000 HC RX 637 (ALT 250 FOR IP): Performed by: FAMILY MEDICINE

## 2021-10-14 PROCEDURE — 2580000003 HC RX 258: Performed by: ORTHOPAEDIC SURGERY

## 2021-10-14 PROCEDURE — 0SCC0ZZ EXTIRPATION OF MATTER FROM RIGHT KNEE JOINT, OPEN APPROACH: ICD-10-PCS | Performed by: ORTHOPAEDIC SURGERY

## 2021-10-14 PROCEDURE — 87075 CULTR BACTERIA EXCEPT BLOOD: CPT

## 2021-10-14 PROCEDURE — 87186 SC STD MICRODIL/AGAR DIL: CPT

## 2021-10-14 PROCEDURE — 2720000010 HC SURG SUPPLY STERILE: Performed by: ORTHOPAEDIC SURGERY

## 2021-10-14 PROCEDURE — 6360000002 HC RX W HCPCS: Performed by: ORTHOPAEDIC SURGERY

## 2021-10-14 PROCEDURE — 96366 THER/PROPH/DIAG IV INF ADDON: CPT

## 2021-10-14 PROCEDURE — 87070 CULTURE OTHR SPECIMN AEROBIC: CPT

## 2021-10-14 PROCEDURE — 0J9N0ZZ DRAINAGE OF RIGHT LOWER LEG SUBCUTANEOUS TISSUE AND FASCIA, OPEN APPROACH: ICD-10-PCS | Performed by: ORTHOPAEDIC SURGERY

## 2021-10-14 PROCEDURE — 7100000001 HC PACU RECOVERY - ADDTL 15 MIN: Performed by: ORTHOPAEDIC SURGERY

## 2021-10-14 PROCEDURE — 7100000000 HC PACU RECOVERY - FIRST 15 MIN: Performed by: ORTHOPAEDIC SURGERY

## 2021-10-14 PROCEDURE — 6370000000 HC RX 637 (ALT 250 FOR IP): Performed by: INTERNAL MEDICINE

## 2021-10-14 PROCEDURE — 3700000000 HC ANESTHESIA ATTENDED CARE: Performed by: ORTHOPAEDIC SURGERY

## 2021-10-14 PROCEDURE — 1200000000 HC SEMI PRIVATE

## 2021-10-14 PROCEDURE — 3600000002 HC SURGERY LEVEL 2 BASE: Performed by: ORTHOPAEDIC SURGERY

## 2021-10-14 PROCEDURE — 94761 N-INVAS EAR/PLS OXIMETRY MLT: CPT

## 2021-10-14 PROCEDURE — 3700000001 HC ADD 15 MINUTES (ANESTHESIA): Performed by: ORTHOPAEDIC SURGERY

## 2021-10-14 PROCEDURE — 6360000002 HC RX W HCPCS: Performed by: NURSE ANESTHETIST, CERTIFIED REGISTERED

## 2021-10-14 PROCEDURE — 27301 DRAIN THIGH/KNEE LESION: CPT | Performed by: ORTHOPAEDIC SURGERY

## 2021-10-14 PROCEDURE — 87077 CULTURE AEROBIC IDENTIFY: CPT

## 2021-10-14 PROCEDURE — 6360000002 HC RX W HCPCS

## 2021-10-14 RX ORDER — CIPROFLOXACIN 500 MG/1
750 TABLET, FILM COATED ORAL EVERY 12 HOURS SCHEDULED
Status: DISCONTINUED | OUTPATIENT
Start: 2021-10-14 | End: 2021-10-15 | Stop reason: HOSPADM

## 2021-10-14 RX ORDER — KETOROLAC TROMETHAMINE 30 MG/ML
INJECTION, SOLUTION INTRAMUSCULAR; INTRAVENOUS PRN
Status: DISCONTINUED | OUTPATIENT
Start: 2021-10-14 | End: 2021-10-14 | Stop reason: SDUPTHER

## 2021-10-14 RX ORDER — MIDAZOLAM HYDROCHLORIDE 1 MG/ML
INJECTION INTRAMUSCULAR; INTRAVENOUS PRN
Status: DISCONTINUED | OUTPATIENT
Start: 2021-10-14 | End: 2021-10-14 | Stop reason: SDUPTHER

## 2021-10-14 RX ORDER — OXYCODONE HYDROCHLORIDE 5 MG/1
5 TABLET ORAL EVERY 4 HOURS PRN
Status: DISCONTINUED | OUTPATIENT
Start: 2021-10-14 | End: 2021-10-15 | Stop reason: HOSPADM

## 2021-10-14 RX ORDER — SODIUM CHLORIDE, SODIUM LACTATE, POTASSIUM CHLORIDE, CALCIUM CHLORIDE 600; 310; 30; 20 MG/100ML; MG/100ML; MG/100ML; MG/100ML
INJECTION, SOLUTION INTRAVENOUS CONTINUOUS
Status: DISCONTINUED | OUTPATIENT
Start: 2021-10-14 | End: 2021-10-14

## 2021-10-14 RX ORDER — ONDANSETRON 2 MG/ML
INJECTION INTRAMUSCULAR; INTRAVENOUS PRN
Status: DISCONTINUED | OUTPATIENT
Start: 2021-10-14 | End: 2021-10-14 | Stop reason: SDUPTHER

## 2021-10-14 RX ORDER — KETAMINE HCL 50MG/ML(1)
SYRINGE (ML) INTRAVENOUS PRN
Status: DISCONTINUED | OUTPATIENT
Start: 2021-10-14 | End: 2021-10-14 | Stop reason: SDUPTHER

## 2021-10-14 RX ORDER — LIDOCAINE HYDROCHLORIDE 20 MG/ML
INJECTION, SOLUTION INTRAVENOUS PRN
Status: DISCONTINUED | OUTPATIENT
Start: 2021-10-14 | End: 2021-10-14 | Stop reason: SDUPTHER

## 2021-10-14 RX ORDER — LINEZOLID 600 MG/1
600 TABLET, FILM COATED ORAL EVERY 12 HOURS SCHEDULED
Status: DISCONTINUED | OUTPATIENT
Start: 2021-10-14 | End: 2021-10-15 | Stop reason: HOSPADM

## 2021-10-14 RX ORDER — PROPOFOL 10 MG/ML
INJECTION, EMULSION INTRAVENOUS PRN
Status: DISCONTINUED | OUTPATIENT
Start: 2021-10-14 | End: 2021-10-14 | Stop reason: SDUPTHER

## 2021-10-14 RX ORDER — FENTANYL CITRATE 50 UG/ML
INJECTION, SOLUTION INTRAMUSCULAR; INTRAVENOUS PRN
Status: DISCONTINUED | OUTPATIENT
Start: 2021-10-14 | End: 2021-10-14 | Stop reason: SDUPTHER

## 2021-10-14 RX ADMIN — CIPROFLOXACIN HYDROCHLORIDE 750 MG: 500 TABLET, FILM COATED ORAL at 21:16

## 2021-10-14 RX ADMIN — VANCOMYCIN 1750 MG: 1.25 INJECTION, SOLUTION INTRAVENOUS at 13:03

## 2021-10-14 RX ADMIN — Medication 25 MG: at 12:55

## 2021-10-14 RX ADMIN — SODIUM CHLORIDE, PRESERVATIVE FREE 10 ML: 5 INJECTION INTRAVENOUS at 21:17

## 2021-10-14 RX ADMIN — LIDOCAINE HYDROCHLORIDE 50 MG: 20 INJECTION, SOLUTION INTRAVENOUS at 12:55

## 2021-10-14 RX ADMIN — FENTANYL CITRATE 100 MCG: 50 INJECTION, SOLUTION INTRAMUSCULAR; INTRAVENOUS at 12:55

## 2021-10-14 RX ADMIN — LINEZOLID 600 MG: 600 TABLET, FILM COATED ORAL at 21:16

## 2021-10-14 RX ADMIN — SODIUM CHLORIDE, PRESERVATIVE FREE 10 ML: 5 INJECTION INTRAVENOUS at 09:07

## 2021-10-14 RX ADMIN — SODIUM CHLORIDE, POTASSIUM CHLORIDE, SODIUM LACTATE AND CALCIUM CHLORIDE: 600; 310; 30; 20 INJECTION, SOLUTION INTRAVENOUS at 14:22

## 2021-10-14 RX ADMIN — KETOROLAC TROMETHAMINE 30 MG: 30 INJECTION, SOLUTION INTRAMUSCULAR; INTRAVENOUS at 13:38

## 2021-10-14 RX ADMIN — PROPOFOL 150 MG: 10 INJECTION, EMULSION INTRAVENOUS at 12:55

## 2021-10-14 RX ADMIN — MIDAZOLAM 2 MG: 1 INJECTION INTRAMUSCULAR; INTRAVENOUS at 12:47

## 2021-10-14 RX ADMIN — ONDANSETRON 4 MG: 2 INJECTION INTRAMUSCULAR; INTRAVENOUS at 12:55

## 2021-10-14 RX ADMIN — ACETAMINOPHEN 650 MG: 325 TABLET ORAL at 21:17

## 2021-10-14 RX ADMIN — PIPERACILLIN SODIUM AND TAZOBACTAM SODIUM 3375 MG: 3; .375 INJECTION, POWDER, LYOPHILIZED, FOR SOLUTION INTRAVENOUS at 08:58

## 2021-10-14 ASSESSMENT — PULMONARY FUNCTION TESTS
PIF_VALUE: 0
PIF_VALUE: 19
PIF_VALUE: 7
PIF_VALUE: 19
PIF_VALUE: 15
PIF_VALUE: 19
PIF_VALUE: 20
PIF_VALUE: 19
PIF_VALUE: 0
PIF_VALUE: 0
PIF_VALUE: 14
PIF_VALUE: 7
PIF_VALUE: 20
PIF_VALUE: 20
PIF_VALUE: 36
PIF_VALUE: 0
PIF_VALUE: 0
PIF_VALUE: 19
PIF_VALUE: 6
PIF_VALUE: 1
PIF_VALUE: 14
PIF_VALUE: 19
PIF_VALUE: 20
PIF_VALUE: 14
PIF_VALUE: 19
PIF_VALUE: 29
PIF_VALUE: 1
PIF_VALUE: 14
PIF_VALUE: 19
PIF_VALUE: 19
PIF_VALUE: 6
PIF_VALUE: 19
PIF_VALUE: 14
PIF_VALUE: 14
PIF_VALUE: 19
PIF_VALUE: 6
PIF_VALUE: 14
PIF_VALUE: 19
PIF_VALUE: 24
PIF_VALUE: 19
PIF_VALUE: 2
PIF_VALUE: 16
PIF_VALUE: 12
PIF_VALUE: 7
PIF_VALUE: 7
PIF_VALUE: 19
PIF_VALUE: 19
PIF_VALUE: 15
PIF_VALUE: 19
PIF_VALUE: 14
PIF_VALUE: 19
PIF_VALUE: 19
PIF_VALUE: 5
PIF_VALUE: 5
PIF_VALUE: 14
PIF_VALUE: 19
PIF_VALUE: 19
PIF_VALUE: 23
PIF_VALUE: 0
PIF_VALUE: 26
PIF_VALUE: 20
PIF_VALUE: 6
PIF_VALUE: 19

## 2021-10-14 ASSESSMENT — PAIN SCALES - GENERAL
PAINLEVEL_OUTOF10: 0
PAINLEVEL_OUTOF10: 0
PAINLEVEL_OUTOF10: 2
PAINLEVEL_OUTOF10: 0

## 2021-10-14 NOTE — PROGRESS NOTES
[facility]  consulted by YOSELIN Gaming CNP for monitoring and adjustment. Indication for treatment: Surgical site infection  Goal AUC: < 15  Goal trough: 10 - 15 mcg/mL    Pertinent Laboratory Values:   Temp Readings from Last 3 Encounters:   10/14/21 97.3 °F (36.3 °C) (Temporal)   10/14/21 97.7 °F (36.5 °C)   10/11/21 97.5 °F (36.4 °C)     Recent Labs     10/12/21  2000 10/13/21  1000   WBC 7.9 7.0     Recent Labs     10/12/21  2000 10/13/21  1000   BUN 13 11   CREATININE 0.9 0.8*     Estimated Creatinine Clearance: 167 mL/min (A) (based on SCr of 0.8 mg/dL (L)). Intake/Output Summary (Last 24 hours) at 10/14/2021 1431  Last data filed at 10/14/2021 1403  Gross per 24 hour   Intake    Output 10 ml   Net -10 ml       Pertinent Cultures:  Date    Source    Results  10/12   Blood    No growth           Assessment:  · WBC remains normal, pt afebrile  · SCr remains normal, no UOP data  · Patient to surgery for I&D today  · Day(s) of therapy: 2  · Vancomycin concentration:   · 10/14 - no level collected    Plan:  · No vanco level done today, pt to surgery, rescheduled level for later tonight  · Renal trends have been normal  · Continue vancomycin 1750mg ivpb q12h  · Predicted AUC: 433; Predicted Trough: 10.9  · Pharmacy will continue to monitor patient and adjust therapy as indicated    VANCOMYCIN CONCENTRATION SCHEDULED FOR 10/15/2021 @00:00    Thank you for the consult. Diego Kirby, [facility]   10/14/2021 2:31 PM

## 2021-10-14 NOTE — BRIEF OP NOTE
Brief Postoperative Note      Patient: Rosanna Skiff  YOB: 1985  MRN: [id number]    Date of Procedure: 10/14/2021    Pre-Op Diagnosis: -    Post-Op Diagnosis: Same       Procedure(s):  RIGHT KNEE INCISION AND DRAINAGE    Surgeon(s):  Lucia Carson DO    Assistant:  Physician Assistant: Khanh Pineda PA-C    Anesthesia: General    Estimated Blood Loss (mL): Minimal    Complications: None    Specimens:   ID Type Source Tests Collected by Time Destination   1 : Right knee culture Specimen Joint, Knee CULTURE, SURGICAL Lucia Carson DO 10/14/2021 1323        Implants:  * No implants in log *      Drains: * No LDAs found *    Findings: R knee abscess    Electronically signed by Maria Elena Bear DO on 10/14/2021 at 1:38 PM

## 2021-10-14 NOTE — PROGRESS NOTES
1408 patient received from the OR monitor placed and alarms on. Report received from [employer]. 1420 awake and denies any pain or discomfort at this time full sensation noted right leg able to wiggle his toes.  Capillary refill wnl   1430 denies any needs at this time  1445 report given to Lor Peña 144 prior to transfer back to room   1450 transferred back to room via bed with belongings per transporter

## 2021-10-14 NOTE — CONSULTS
Infectious Disease Consult Note  10/14/2021   Patient Name: Ophelia Mccollum : 1985   Impression   Right knee prepatella cellulitis and stitch abscess: Previous infection with MSSA. Likely present once more. Cannot rule out MRSA or other microbes.  Obesity   Hypertension   Depression   Multi-morbidity: per PMHx  Plan:   Therapeutic: Discontinue vancomycin and Zosyn. Start linezolid and ciprofloxacin. The patient requires close follow-up in the office as he will probably need extended duration of antibiotics i.e. 6-12 weeks.  Diagnostic: Weekly CBC.  F/u test: Surgical culture   See 1 week of discharge. Thank you for allowing me to consult in the care of this patient.  ------------------------  REASON FOR CONSULT: Infective syndrome   Requested by: Dr. Artie Rojas is a 28 y.o.  male with medical history of obesity, hypertension and depression. He is known to the infectious disease service; he initially had a right patella tendon rupture on 2021 while playing baseball. He underwent repair of the right patella tendon with a 5 x 5 cm graft on 2021. Subsequently, it was complicated by MSSA infection. He completed a 4-week course of cefazolin on 2021. He developed a right knee abscess and underwent an incision and drainage on 2021. It was told him that he had a retained stitch that was infected. He was treated with Keflex for 10 days. He subsequently saw me in the office received a cumulative course of doxycycline for a month. He completed the doxycycline on 2021. He was admitted 10/12/2021 for further evaluation and management of right knee swelling that developed about a week ago. He had seen Dr. Ghazala Mclean in the office. He was prescribed Keflex once more. However swelling on his knee got worse. He Was then referred to the hospital for admission and underwent right knee incision and drainage on 10/14/2021.   Non-small found to have a stitch abscess. He received vancomycin and Zosyn. He denies nausea, vomiting or diarrhea. He denies any new antecedent trauma to his right knee. ? Infectious diseases service was consulted to evaluate the pt, and recommend further investigative and therapeutic measures. Review and summary of old records:  ROS: Other systems reviewed Including eyes, ENT, respiratory, cardiovascular, GI, , dermatologic, neurologic, psych, hem/lymphatic, musculoskeletal and endocrine were negative other than what is mentioned above. Patient Active Problem List    Diagnosis Date Noted    Postoperative infection     Chronic infection of right knee ([address] 10/14/2021    Infection of right knee ([address] 10/12/2021    Morbid obesity with BMI of 40.0-44.9, adult ([address] 10/11/2021    MSSA (methicillin susceptible Staphylococcus aureus) infection 07/28/2021    Receiving intravenous antibiotic treatment as outpatient 07/28/2021    Cellulitis of right knee 07/04/2021    Rupture of right patellar tendon     History of attention deficit disorder 09/28/2020    Essential hypertension 07/01/2019    Situational anxiety 07/01/2019     Past Medical History:   Diagnosis Date    Hypertension       Past Surgical History:   Procedure Laterality Date    INGUINAL HERNIA REPAIR Left 1986    KNEE SURGERY Right 8/25/2021    RIGHT KNEE INCISION AND DRAINAGE performed by Lisa Villagomez DO at [address] [facility] [address] Right 7/6/2021    RIGHT KNEE DEBRIDEMENT INCISION AND DRAINAGE performed by Lisa Villagomez DO at [address] [facility] Right 6/22/2021    RIGHT PATELLA TENDON REPAIR performed by [facility] DO at [facility] OR      Family History   Problem Relation Age of Onset    Other Mother         Adriano's    Cancer Father         Testicular      Infectious disease related family history - not contibutory.    SOCIAL HISTORY  Social History     Tobacco Use    Smoking status: Never Smoker    Smokeless tobacco: Former User Types: Chew   Substance Use Topics    Alcohol use: Yes     Comment: Occasionally       Born:  [address] [birth year] Occupation:   No recent travel of significance.  No recent unusual exposures.  NO pets    ? ALLERGIES  No Known Allergies   MEDICATIONS  Reviewed and are per the chart/EMR. IMMUNIZATION HISTORY  Immunization History   Administered Date(s) Administered    COVID-19, Moderna, PF, 100mcg/0.5mL 10/04/2021     ? Antibiotics:   Vancomycin  Zosyn  ?  -------------------------------------------------------------------------------------------------------------------    Vital Signs:  Vitals:    10/14/21 0732   BP:    Pulse: 68   Resp:    Temp:    SpO2:          Exam:    VS: noted; wt 122.5 kg  Gen: alert and oriented X3, no distress  Skin: no stigmata of endocarditis  Wounds: Right knee dressing C/D/I  HEMT: AT/NC Oropharynx pink, moist, and without lesions or exudates; dentition in good state of repair  Eyes: PERRLA, EOMI, conjunctiva pink, sclera anicteric. Neck: Supple. Trachea midline. No LAD. Chest: no distress and CTA. Good air movement. Heart: RRR and no MRG. Abd: soft, non-distended, no tenderness, no hepatomegaly. Normoactive bowel sounds. Ext: no clubbing, cyanosis, or edema  Catheter Site: without erythema or tenderness  LDA:   Neuro: Mental status intact. CN 2-12 intact and no focal sensory or motor deficits    ? Diagnostic Studies: reviewed  ? ? I have examined this patient and available medical records on this date and have made the above observations, conclusions and recommendations.   Electronically signed by: Electronically signed by Hannah Liao MD on 10/14/2021 at 12:00 PM

## 2021-10-14 NOTE — PROGRESS NOTES
Hospitalist Progress Note      Name:  Ophelia Mccollum /Age/Sex: 1985  (28 y.o. male)   MRN & CSN:  [id number] & [id number] Admission Date/Time: 10/12/2021  6:54 PM   Location:  [facility] /WIJ65-44 PCP: Theron Guzman MD         Hospital Day: 3    Assessment and Plan:   Ophelia Mccollum is a 28 y.o.  male  who presents with right knee infection. Right knee infection  -Status post right patellar tendon repair in 2021  -2 previous infections at postop site  -Started on Keflex 10/11/2021  -Sent to ED from Dr. Alysa Carrillo office for IV antibiotics  - XR R knee 10/12 showed prepatellar soft tissue swelling with no acute osseous abnormality  -Dr. Kiet Owen for I&D today   - continue broad spectrum antibiotics, infectious disease consulted     Essential hypertension  -hold HCTZ/lisinopril perioperatively, resume as able     This patient was seen and examined autonomously  A hospitalist attending physician was available for questions/consultation as needed. Diet Diet NPO   DVT Prophylaxis [] Lovenox, []  Heparin, [] SCDs, [] Ambulation   GI Prophylaxis [] PPI,  [] H2 Blocker,  [] Carafate,  [x] Diet/Tube Feeds   Code Status Full Code   Disposition Patient requires continued admission due to right knee infection          History of Present Illness:     Chief Complaint:Crow Marcos is a 28 y.o.  male  who presents with right knee infection. Patient seen and examined at bedside. No acute events overnight. He denies any significant pain in his right knee. Denies fever, chills. We discussed plan of care today, patient is aware surgery will happen in the afternoon. He is currently n.p.o. Ten point ROS reviewed negative, unless as noted above    Objective:        Intake/Output Summary (Last 24 hours) at 10/14/2021 0720  Last data filed at 10/13/2021 1010  Gross per 24 hour   Intake 400 ml   Output    Net 400 ml      Vitals:   Vitals:    10/14/21 0408   BP: 134/82   Pulse: 75 Resp: 20   Temp: 98.1 °F (36.7 °C)   SpO2: 98%     Physical Exam:     GEN Awake male, sitting upright in bed in no apparent distress. Appears given age. EYES Pupils are equally round. No scleral erythema, discharge, or conjunctivitis. HENT Mucous membranes are moist.  NECK Supple, no apparent thyromegaly or masses. RESP Clear to auscultation, no wheezes, rales or rhonchi. Respirations even and unlabored on RA. CARDIO/VASC   S1/S2 auscultated. Regular rate without appreciable murmurs, rubs, or gallops. Peripheral pulses equal bilaterally and palpable. No peripheral edema. GI Abdomen is soft without significant tenderness, masses, or guarding. Bowel sounds are normoactive.  No costovertebral angle tenderness. Locke catheter is not present. MSK No gross joint deformities. Right knee incision with mild swelling, mild surrounding erythema. SKIN Normal coloration, warm, dry. NEURO Cranial nerves appear grossly intact, normal speech, no lateralizing weakness. PSYCH Awake, alert, oriented x 4. Affect appropriate.     Medications:   Medications:    vancomycin  1,750 mg IntraVENous Q12H    lisinopril-hydroCHLOROthiazide  1 tablet Oral Daily    sertraline  50 mg Oral Daily    sodium chloride flush  5-40 mL IntraVENous 2 times per day    enoxaparin  40 mg SubCUTAneous Daily    piperacillin-tazobactam  3,375 mg IntraVENous Q8H      Infusions:    sodium chloride       PRN Meds: sodium chloride flush, 5-40 mL, PRN  sodium chloride, 25 mL, PRN  ondansetron, 4 mg, Q8H PRN   Or  ondansetron, 4 mg, Q6H PRN  polyethylene glycol, 17 g, Daily PRN  acetaminophen, 650 mg, Q6H PRN   Or  acetaminophen, 650 mg, Q6H PRN          Electronically signed by Christian Adam PA-C on 10/14/2021 at 7:20 AM

## 2021-10-14 NOTE — ANESTHESIA PRE PROCEDURE
Department of Anesthesiology  Preprocedure Note       Name:  Shala Borja   Age:  28 y.o.  :  1985                                          MRN:  [id number]         Date:  10/14/2021      Surgeon: Lyle Whatley):  Kavitha Castro DO    Procedure: Procedure(s):  RIGHT KNEE INCISION AND DRAINAGE    Medications prior to admission:   Prior to Admission medications    Medication Sig Start Date End Date Taking? Authorizing Provider   cephALEXin (KEFLEX) 250 MG capsule Take 2 capsules by mouth 4 times daily for 14 days 10/11/21 10/25/21  Kavitha Castro DO   fluocinonide (LIDEX) 0.05 % external solution apply to rash ON scalp twice a day if needed for psoriasis FLARES 21   Historical Provider, MD   sertraline (ZOLOFT) 50 MG tablet Take 1 tablet by mouth daily 3/29/21 10/11/21  Shantanu Degroot MD   lisinopril-hydroCHLOROthiazide [facility]) 10-12.5 MG per tablet Take 1 tablet by mouth daily 3/29/21 10/11/21  Shantanu Degroot MD       Current medications:    No current facility-administered medications for this visit. No current outpatient medications on file.      Facility-Administered Medications Ordered in Other Visits   Medication Dose Route Frequency Provider Last Rate Last Admin    vancomycin (VANCOCIN) 1750 mg in 350 mL IVPB  1,750 mg IntraVENous Q12H YOSELIN Jean CNP   Stopped at 10/13/21 7399    [Held by provider] lisinopril-hydroCHLOROthiazide (PRINZIDE;ZESTORETIC) 10-12.5 MG per tablet 1 tablet  1 tablet Oral Daily YOSELIN Jean CNP   1 tablet at 10/13/21 0957    sertraline (ZOLOFT) tablet 50 mg  50 mg Oral Daily YOSELIN Jean CNP   50 mg at 10/13/21 8243    sodium chloride flush 0.9 % injection 5-40 mL  5-40 mL IntraVENous 2 times per day YOSELIN Jean CNP   10 mL at 10/13/21 2130    sodium chloride flush 0.9 % injection 5-40 mL  5-40 mL IntraVENous PRN YOSELIN Jean CNP        0.9 % sodium chloride infusion  25 mL IntraVENous PRN Ival Lob, APRN - CNP        [Held by provider] enoxaparin (LOVENOX) injection 40 mg  40 mg SubCUTAneous Daily Ival Lob, APRN - CNP   40 mg at 10/13/21 0309    ondansetron (ZOFRAN-ODT) disintegrating tablet 4 mg  4 mg Oral Q8H PRN Ival Lob, APRN - CNP        Or    ondansetron (ZOFRAN) injection 4 mg  4 mg IntraVENous Q6H PRN Ival Lob, APRN - CNP        polyethylene glycol (GLYCOLAX) packet 17 g  17 g Oral Daily PRN Ival Lob, APRN - CNP        acetaminophen (TYLENOL) tablet 650 mg  650 mg Oral Q6H PRN Ival Lob, APRN - CNP        Or    acetaminophen (TYLENOL) suppository 650 mg  650 mg Rectal Q6H PRN Ival Lob, APRN - CNP        piperacillin-tazobactam (ZOSYN) 3,375 mg in dextrose 5 % 50 mL IVPB extended infusion (mini-bag)  3,375 mg IntraVENous Q8H Ival Lob, APRN - CNP   Stopped at 10/14/21 0358       Allergies:  No Known Allergies    Problem List:    Patient Active Problem List   Diagnosis Code    Essential hypertension I10    Situational anxiety F41.8    History of attention deficit disorder Z86.59    Rupture of right patellar tendon S86.811A    Cellulitis of right knee L03.115    Postoperative infection T81.40XA    MSSA (methicillin susceptible Staphylococcus aureus) infection A49.01    Receiving intravenous antibiotic treatment as outpatient Z79.2    Morbid obesity with BMI of 40.0-44.9, adult ([facility] [address] E66.01, Z68.41    Infection of right knee ([facility] [address] M00.9       Past Medical History:        Diagnosis Date    Hypertension        Past Surgical History:        Procedure Laterality Date    INGUINAL HERNIA REPAIR Left 1986    KNEE SURGERY Right 8/25/2021    RIGHT KNEE INCISION AND DRAINAGE performed by Manish Castaneda DO at [facility] Right 7/6/2021    RIGHT KNEE DEBRIDEMENT INCISION AND DRAINAGE performed by Manish Castaneda DO at [facility] Right 6/22/2021    RIGHT PATELLA TENDON REPAIR Applicable):   Lab Results   Component Value Date    COVID19 NOT DETECTED 10/13/2021    COVID19 NOT DETECTED 08/23/2021           Anesthesia Evaluation  Patient summary reviewed no history of anesthetic complications:   Airway: Mallampati: II  TM distance: >3 FB   Neck ROM: full  Comment: Large head and neck  Mouth opening: > = 3 FB Dental: normal exam         Pulmonary:Negative Pulmonary ROS breath sounds clear to auscultation                             Cardiovascular:  Exercise tolerance: good (>4 METS),   (+) hypertension: mild,         Rhythm: regular  Rate: normal                    Neuro/Psych:   (+) psychiatric history (ADHD):             ROS comment: adhd GI/Hepatic/Renal:   (+) morbid obesity (BMI 40)          Endo/Other: Negative Endo/Other ROS             Pt had no PAT visit       Abdominal:   (+) obese,           Vascular: negative vascular ROS. Other Findings:               Anesthesia Plan      general     ASA 2     (Chart review only 10/14/21  Needs rapid covid )  Induction: intravenous. MIPS: Postoperative opioids intended.                       YOSELIN Eid - CRNA   10/14/2021 [Spouse] : spouse [FreeTextEntry1] : pt  here  for cpe and management of HTN HLD  SEIZURES SEVEN  DM  [de-identified] : FEELING WELL  NO  SEIZURES GLUCOSE  CHECKED  SPORADICALLY USING C-PAP

## 2021-10-14 NOTE — ANESTHESIA POSTPROCEDURE EVALUATION
Department of Anesthesiology  Postprocedure Note    Patient: Adenike De León  MRN: [id number]  YOB: 1985  Date of evaluation: 10/14/2021  Time:  2:14 PM     Procedure Summary     Date: 10/14/21 Room / Location: [address]    Anesthesia Start: 1665 Anesthesia Stop:     Procedure: RIGHT KNEE INCISION AND DRAINAGE (Right Knee) Diagnosis: (-)    Surgeons: Lawrence Coffman DO Responsible Provider: Charlene Patton MD    Anesthesia Type: general ASA Status: 2          Anesthesia Type: No value filed. Freeman Phase I:      Freeman Phase II:      Last vitals: Reviewed and per EMR flowsheets.        Anesthesia Post Evaluation    Patient location during evaluation: PACU  Patient participation: complete - patient participated  Level of consciousness: awake and alert  Pain score: 0  Airway patency: patent  Nausea & Vomiting: no nausea and no vomiting  Complications: no  Cardiovascular status: blood pressure returned to baseline  Respiratory status: acceptable, spontaneous ventilation and face mask

## 2021-10-14 NOTE — PLAN OF CARE
Problem: Discharge Planning:  Goal: Discharged to appropriate level of care  Description: Discharged to appropriate level of care  10/13/2021 2032 by Faith Miller RN  Outcome: Ongoing  10/13/2021 0730 by Guicho Robertson RN  Outcome: Ongoing     Problem: Infection - Surgical Site:  Goal: Will show no infection signs and symptoms  Description: Will show no infection signs and symptoms  10/13/2021 2032 by Faith Miller RN  Outcome: Ongoing  10/13/2021 0730 by Guicho Robertson RN  Outcome: Ongoing

## 2021-10-15 VITALS
WEIGHT: 270 LBS | OXYGEN SATURATION: 94 % | HEIGHT: 69 IN | SYSTOLIC BLOOD PRESSURE: 134 MMHG | RESPIRATION RATE: 16 BRPM | BODY MASS INDEX: 39.99 KG/M2 | TEMPERATURE: 98.9 F | HEART RATE: 71 BPM | DIASTOLIC BLOOD PRESSURE: 78 MMHG

## 2021-10-15 LAB
ANION GAP SERPL CALCULATED.3IONS-SCNC: 9 MMOL/L (ref 4–16)
BASOPHILS ABSOLUTE: 0 K/CU MM
BASOPHILS RELATIVE PERCENT: 0.4 % (ref 0–1)
BUN BLDV-MCNC: 13 MG/DL (ref 6–23)
CALCIUM SERPL-MCNC: 8.7 MG/DL (ref 8.3–10.6)
CHLORIDE BLD-SCNC: 106 MMOL/L (ref 99–110)
CO2: 25 MMOL/L (ref 21–32)
CREAT SERPL-MCNC: 0.6 MG/DL (ref 0.9–1.3)
CREAT SERPL-MCNC: 0.8 MG/DL (ref 0.9–1.3)
DIFFERENTIAL TYPE: ABNORMAL
EOSINOPHILS ABSOLUTE: 0.1 K/CU MM
EOSINOPHILS RELATIVE PERCENT: 1.3 % (ref 0–3)
GFR AFRICAN AMERICAN: >60 ML/MIN/1.73M2
GFR AFRICAN AMERICAN: >60 ML/MIN/1.73M2
GFR NON-AFRICAN AMERICAN: >60 ML/MIN/1.73M2
GFR NON-AFRICAN AMERICAN: >60 ML/MIN/1.73M2
GLUCOSE BLD-MCNC: 91 MG/DL (ref 70–99)
HCT VFR BLD CALC: 40.8 % (ref 42–52)
HEMOGLOBIN: 13.1 GM/DL (ref 13.5–18)
IMMATURE NEUTROPHIL %: 0.4 % (ref 0–0.43)
LYMPHOCYTES ABSOLUTE: 1.6 K/CU MM
LYMPHOCYTES RELATIVE PERCENT: 19.2 % (ref 24–44)
MCH RBC QN AUTO: 28.5 PG (ref 27–31)
MCHC RBC AUTO-ENTMCNC: 32.1 % (ref 32–36)
MCV RBC AUTO: 88.7 FL (ref 78–100)
MONOCYTES ABSOLUTE: 0.7 K/CU MM
MONOCYTES RELATIVE PERCENT: 8.7 % (ref 0–4)
NUCLEATED RBC %: 0 %
PDW BLD-RTO: 13 % (ref 11.7–14.9)
PLATELET # BLD: 283 K/CU MM (ref 140–440)
PMV BLD AUTO: 9.3 FL (ref 7.5–11.1)
POTASSIUM SERPL-SCNC: 4.4 MMOL/L (ref 3.5–5.1)
RBC # BLD: 4.6 M/CU MM (ref 4.6–6.2)
SEGMENTED NEUTROPHILS ABSOLUTE COUNT: 5.8 K/CU MM
SEGMENTED NEUTROPHILS RELATIVE PERCENT: 70 % (ref 36–66)
SODIUM BLD-SCNC: 140 MMOL/L (ref 135–145)
TOTAL IMMATURE NEUTOROPHIL: 0.03 K/CU MM
TOTAL NUCLEATED RBC: 0 K/CU MM
WBC # BLD: 8.3 K/CU MM (ref 4–10.5)

## 2021-10-15 PROCEDURE — 6360000002 HC RX W HCPCS: Performed by: FAMILY MEDICINE

## 2021-10-15 PROCEDURE — 80202 ASSAY OF VANCOMYCIN: CPT

## 2021-10-15 PROCEDURE — 85025 COMPLETE CBC W/AUTO DIFF WBC: CPT

## 2021-10-15 PROCEDURE — 2580000003 HC RX 258: Performed by: FAMILY MEDICINE

## 2021-10-15 PROCEDURE — 6370000000 HC RX 637 (ALT 250 FOR IP): Performed by: INTERNAL MEDICINE

## 2021-10-15 PROCEDURE — 82565 ASSAY OF CREATININE: CPT

## 2021-10-15 PROCEDURE — 6370000000 HC RX 637 (ALT 250 FOR IP): Performed by: FAMILY MEDICINE

## 2021-10-15 PROCEDURE — 80048 BASIC METABOLIC PNL TOTAL CA: CPT

## 2021-10-15 RX ORDER — CIPROFLOXACIN 750 MG/1
750 TABLET, FILM COATED ORAL EVERY 12 HOURS SCHEDULED
Qty: 12 TABLET | Refills: 0 | Status: SHIPPED | OUTPATIENT
Start: 2021-10-15 | End: 2021-10-21

## 2021-10-15 RX ORDER — LINEZOLID 600 MG/1
600 TABLET, FILM COATED ORAL EVERY 12 HOURS SCHEDULED
Qty: 12 TABLET | Refills: 0 | Status: SHIPPED | OUTPATIENT
Start: 2021-10-15 | End: 2021-10-21

## 2021-10-15 RX ORDER — CIPROFLOXACIN 750 MG/1
750 TABLET, FILM COATED ORAL EVERY 12 HOURS SCHEDULED
Qty: 13 TABLET | Refills: 0 | Status: CANCELLED | OUTPATIENT
Start: 2021-10-15 | End: 2021-10-22

## 2021-10-15 RX ADMIN — SODIUM CHLORIDE, PRESERVATIVE FREE 10 ML: 5 INJECTION INTRAVENOUS at 10:21

## 2021-10-15 RX ADMIN — CIPROFLOXACIN HYDROCHLORIDE 750 MG: 500 TABLET, FILM COATED ORAL at 10:21

## 2021-10-15 RX ADMIN — ACETAMINOPHEN 650 MG: 325 TABLET ORAL at 06:49

## 2021-10-15 RX ADMIN — LISINOPRIL AND HYDROCHLOROTHIAZIDE 1 TABLET: 12.5; 1 TABLET ORAL at 10:21

## 2021-10-15 RX ADMIN — LINEZOLID 600 MG: 600 TABLET, FILM COATED ORAL at 10:21

## 2021-10-15 RX ADMIN — ACETAMINOPHEN 650 MG: 325 TABLET ORAL at 12:39

## 2021-10-15 RX ADMIN — ENOXAPARIN SODIUM 40 MG: 40 INJECTION SUBCUTANEOUS at 10:21

## 2021-10-15 ASSESSMENT — PAIN SCALES - GENERAL
PAINLEVEL_OUTOF10: 2
PAINLEVEL_OUTOF10: 0
PAINLEVEL_OUTOF10: 2
PAINLEVEL_OUTOF10: 2

## 2021-10-15 NOTE — DISCHARGE SUMMARY
Discharge Summary    Name:  Dory Wu /Age/Sex: 1985  (28 y.o. male)   MRN & CSN:  [id number] & [id number] Admission Date/Time: 10/12/2021  6:54 PM   Attending:  No att. providers found Discharging Physician: Chaz Alas, [place] [facility] Course:   Dory Wu is a 28 y.o.  male  who presents with right knee infection. Problems addressed during his hospitalization:     Right knee infection  -Status post right patellar tendon repair in 2021  -2 previous infections at postop site  -Started on Keflex 10/11/2021  -Sent to ED from Dr. Shana Humphreys office for IV antibiotics  - XR R knee 10/12 showed prepatellar soft tissue swelling with no acute osseous abnormality  -Status post I&D per Dr. Kaminski Headings 10/14/2021, recommended outpatient follow-up in 2 weeks for suture removal and reevaluation  -Infectious disease evaluated, recommended discharge home on linezolid and ciprofloxacin p.o. for 14 days with follow-up in 1 week     Essential hypertension  -Zoom home HCTZ/lisinopril on discharge    Mood disorder  -Per pharmacy, recommended to hold Zoloft while taking linezolid  -Patient expressed understanding     This patient was seen and examined autonomously  A hospitalist attending physician was available for questions/consultation as needed. The patient expressed appropriate understanding of and agreement with the discharge recommendations, medications, and plan.      Consults this admission:  IP CONSULT TO ORTHOPEDIC SURGERY  IP CONSULT TO HOSPITALIST  IP CONSULT TO ORTHOPEDIC SURGERY  PHARMACY TO DOSE VANCOMYCIN  IP CONSULT TO INFECTIOUS DISEASES    Discharge Instruction:   Follow up appointments: PCP, orthopedic surgery, infectious disease  Primary care physician:  within 2 weeks    Diet:  regular diet   Activity: activity as tolerated  Disposition: Discharged to:   [x]Home, []C, []SNF, []Acute Rehab, []Hospice   Condition on discharge: Stable    Discharge Medications:      Timothy Garcia Home Medication Instructions A:[id number]    Printed on:10/15/21 0902   Medication Information                      cephALEXin (KEFLEX) 250 MG capsule  Take 2 capsules by mouth 4 times daily for 14 days             fluocinonide (LIDEX) 0.05 % external solution  apply to rash ON scalp twice a day if needed for psoriasis FLARES             lisinopril-hydroCHLOROthiazide (PRINZIDE;ZESTORETIC) 10-12.5 MG per tablet  Take 1 tablet by mouth daily             sertraline (ZOLOFT) 50 MG tablet  Take 1 tablet by mouth daily                 Objective Findings at Discharge:   BP (!) 147/85   Pulse 70   Temp 98 °F (36.7 °C) (Oral)   Resp 18   Ht 5' 9\" (1.753 m)   Wt 270 lb (122.5 kg)   SpO2 97%   BMI 39.87 kg/m²            PHYSICAL EXAM   GEN Awake male, sitting upright in bed in no apparent distress. Appears given age. EYES Pupils are equally round. No scleral erythema, discharge, or conjunctivitis. HENT Mucous membranes are moist.   NECK Supple, no apparent thyromegaly or masses. RESP Clear to auscultation, no wheezes, rales or rhonchi. Symmetric chest movement while on room air. CARDIO/VASC S1/S2 auscultated. Regular rate without appreciable murmurs, rubs, or gallops. Peripheral pulses equal bilaterally and palpable. No peripheral edema. GI Abdomen is soft without significant tenderness, masses, or guarding. Bowel sounds are normoactive.  No costovertebral angle tenderness. Locke catheter is not present. HEME/LYMPH No petechiae or ecchymoses. MSK No gross joint deformities. Right knee incision well approximated with sutures in place, packing in place. No purulent drainage noted, small surrounding erythema around incision. SKIN Normal coloration, warm, dry. NEURO Cranial nerves appear grossly intact, normal speech, no lateralizing weakness. PSYCH Awake, alert, oriented x 4. Affect appropriate.     BMP/CBC  Recent Labs     10/12/21  2000 10/13/21  1000 10/15/21  0627    139 140   K 3.8 4.2 4.4    104 106   CO2 24 27 25   BUN 13 11 13   CREATININE 0.9 0.8* 0.8*   WBC 7.9 7.0 8.3   HCT 43.2 39.7* 40.8*    281 283       IMAGING:      Discharge Time of 35 minutes    Electronically signed by Marino Hidalgo PA-C on 10/15/2021 at 9:02 AM

## 2021-10-15 NOTE — OP NOTE
Operative Note        DATE OF PROCEDURE:    10/14/2021     PREOPERATIVE DIAGNOSIS:  Right  prepatellar abscesses     POSTOPERATIVE DIAGNOSIS:  Right  prepatellar abscesses with retained foreign body.     OPERATION PERFORMED:    1. Incision and drainage right prepatellar abscesses. 2.  Removal foreign body right knee, X2      SURGEON:  Corey White DO     FIRST ASSISTANT: JOAQUÍN Sandra    ANESTHESIA:  General.     ESTIMATED BLOOD LOSS:   10 mL.     TOTAL TOURNIQUET TIME:  20 minutes    FLUIDS:   400 mL of crystalloids.     SPECIMEN: Superficial culture right knee skin and soft tissue specimen     INDICATION FOR PROCEDURE:  The patient is a 26-year-old male who previously underwent repair of a right patellar tendon. He underwent I&D of the right knee and was progressing well however last week he developed new onset of 2 new small abscesses one of the proximal portion of the incision and one at the distal portion of the incision. An attempt was tried of oral antibiotics and his symptoms worsen. He was subsequently mated to the hospital for IV antibiotics with no change in his symptoms. At this point given his worsening symptoms I recommend additional surgical treatment. I explained the risk, benefits, possible complication of the procedure to the patient and after answering all of his questions he consented undergo the above procedure.     OPERATIVE PROCEDURE:  The patient was seen and evaluated in the  preoperative holding area where the right lower extremity was signed in  his presence.  At this point, care of the patient was turned over to the  anesthesia team who transported him back to the operative suite.    General anesthesia was applied and once adequate anesthesia was  obtained, the right lower extremity was prepped and draped in the usual  sterile fashion.  Preoperative antibiotics were administered.  At this  point, a time-out was performed and all in attendance were in agreement.     I exsanguinated the right lower extremity with use of gravity and tourniquet was then deflated to 200 mmHg. I then performed an elliptical incision around the distal abscess excising the necrotic skin. I did encounter a small pocket of purulence and cultures were taken from this location. I then used a curette to debride necrotic soft tissue from around the abscess. I also found the knot and suture present from the lateral aspect of the patellar tendon from his original patellar tendon repair. With then turned my attention to the proximal abscess. I made another elliptical incision around this abscess and again encountered a small amount of purulent fluid. I then excised the necrotic appearing skin. I then used a curette to debride necrotic soft tissue around the lesion and found that this abscess did track deep to the superior pole of the patella. I found that this abscess did communicate with the lateral patellar tendon suture. I then cut the knot at the distal abscess and then grasped hold of the suture and was able to remove this out of the patella without any difficulty. I then flexed and extended the knee and found the abscess did not appear to communicate with the articular surface. I also found that this abscess did not undermine the subcutaneous tissue and did not appear to track over to the medial patellar tendon repair suture. Once adequate debridement was performed I then irrigated both abscesses with pulse lavage for a total of 4 L of sterile normal saline followed by 1 L of sterile normal saline with gentamicin. I then applied iodoform packing to both of the abscesses. I then closed subcutaneous tissue using 2-0 Vicryl suture followed by skin closure with 4-0 Prolene in horizontal mattress fashion. Tourniquet was then deflated for total of 20 minutes medical hemostasis was maintained. I then applied a sterile soft dressing to the right lower extremity.   Patient was then awakened from anesthesia and transported to PACU in stable condition. He appeared to have tolerated the procedure well.       PROGNOSIS:  At this point, he will be readmitted back to the hospital for continuing IV antibiotics as well as postoperative pain control and medical monitoring. Infectious disease has been consulted for recommendations on long-term antibiotics.   Following his discharge,  I will see him back in the office in 2 weeks and continue to monitor his progress in the outpatient setting for resolution of his symptoms.           Celia 97, DO

## 2021-10-17 LAB
CULTURE: NORMAL
CULTURE: NORMAL
Lab: NORMAL
Lab: NORMAL
SPECIMEN: NORMAL
SPECIMEN: NORMAL

## 2021-10-19 ENCOUNTER — HOSPITAL ENCOUNTER (OUTPATIENT)
Dept: PHYSICAL THERAPY | Age: 36
Setting detail: THERAPIES SERIES
Discharge: HOME | End: 2021-10-19
Payer: COMMERCIAL

## 2021-10-19 ENCOUNTER — TELEPHONE (OUTPATIENT)
Dept: ORTHOPEDIC SURGERY | Age: 36
End: 2021-10-19

## 2021-10-19 LAB
CULTURE: ABNORMAL
Lab: ABNORMAL
Lab: ABNORMAL
SPECIMEN: ABNORMAL
SPECIMEN: ABNORMAL

## 2021-10-19 NOTE — LETTER
[facility] [place] [address] [place] [address]  Phone: [phone]  Fax: [phone]    Adria Persaud DO        October 19, 2021     Patient: Monica Velasco   YOB: 1985   Date of Visit: 10/19/2021       To Whom It May Concern: It is my medical opinion that Jimbo Turcios may return to physical therapy. .    If you have any questions or concerns, please don't hesitate to call.     Sincerely,        Adria Persaud DO

## 2021-10-25 ENCOUNTER — HOSPITAL ENCOUNTER (OUTPATIENT)
Dept: PHYSICAL THERAPY | Age: 36
Setting detail: THERAPIES SERIES
Discharge: HOME | End: 2021-10-25
Payer: COMMERCIAL

## 2021-10-25 PROCEDURE — 97110 THERAPEUTIC EXERCISES: CPT

## 2021-10-25 PROCEDURE — 97530 THERAPEUTIC ACTIVITIES: CPT

## 2021-10-25 PROCEDURE — 97112 NEUROMUSCULAR REEDUCATION: CPT

## 2021-10-25 NOTE — FLOWSHEET NOTE
Outpatient Physical Therapy  [place]           [x] Phone: [phone]   Fax: [phone]  [place]           [] Phone: [phone]   Fax: [phone]        Physical Therapy Daily Treatment Note  Date:  10/25/2021    Patient Name:  Ophelia Mccollum    :  1985  MRN: [id number]  Restrictions/Precautions:   follow protocol  Diagnosis:   Diagnosis: Postop check. R patella tendon repair. R knee I&D  Date of Injury/Surgery: 21 repair. 21 debridment. Treatment Diagnosis: Treatment Diagnosis: RLE weakness, impaired gait    Insurance/Certification information: PT Insurance Information: UMR - 60 PCY   Referring Physician:  Referring Practitioner: Jackson Terry DO  Next Doctor Visit:    Plan of care signed (Y/N):  Y  Outcome Measure: LEFS:   Visit# / total visits:    Pain level: 0/10     Goals:     Patient goals : improve mobility of right leg and get back to work  Short term goals  Time Frame for Short term goals: 5 visits  Short term goal 1: Pt will be IND with HEP in order to maximize recovery outside of clinic MET   Long term goals  Time Frame for Long term goals : 10 visits  Long term goal 1: Pt will improve R knee AROM from 0-115 to aide in stairs MET   Long term goal 2: Pt will improve gross RLE strength to 4+/5 to aide in ADLs Progressing   Long term goal 3: Pt will improve LEFS to 65 or higher to show [address] and subjective improvement Not MET   Long term goal 4: Pt will ambulate into clinic with no brace, no deviations and equal stance time Almost MET     Summary of Evaluation: Assessment: Pt is a 60-year-old male who presents to clinic following a R patellar tendon repair on 21, as well as debridement, incision and drainage of infection on 21. Upon assessment, pt presents with impaired gait, impaired balance, impaired R knee strength and ROM and overall reduced independent with ADL/ iADL and work-related tasks.  Pt would benefit from skilled therapy interventions to address listed impairments, progress toward goal completion and improve ADL/IADL status. PT also warranted to reduce risk for further injury or decline. Subjective:  Pt stated that he has no pain and no swelling today. Pt stated that he ran out of his antibiotic last Thursday and doesn't see Dr. Barrie Douglas Thursday. Any changes in Ambulatory Summary Sheet? None      Objective: COVID screening questions were asked and patient attested that there had been no contact or symptoms    Arrives w/ gauze covering knee reducing full flex/ ext during gait    Exercises: (No more than 4 columns)   Exercise/Equipment 10/7/21 #11 10/11/21 #12 10/19/21 #13 10/25/2021 #14            WARM UP       Nu-step        Elliptical x5'  x 5' x5' 5'   TABLE       SAQ       LAQ 2x10, 1# 2x10, 1#     SLR 2x10, 1# 2x10, 1#  0# 5x    Single leg bridge x10 10x2  10x   S/L hip abduction   10x2  0# 10x2             STANDING       STS       TKE with band        Anterior step up     6\" w/ airex on top 10x2   Hamstring curl machine     DL 50# 10 x2   R LE 25 # 10x2   Shuttle leg press     4C 10x2 DL  2C 10x2 R only   SL squat to table x10 RLE x10 RLE from elevated table     Machines downstairs  Leg press  Leg curl  Leg ext Press: 2x10 77#  Curl: 44# x10  Ext: 22# x10 Press: 2x10 77#  Curl: 44# 2x10  Ext: 22# 2x10  --   Lateral heel tap    4\" 10x2 w/ UE support   Lateral step up    6\" w/ airex on top 10x2   FT retro walk        FT fwd walk          PROPRIOCEPTION       SLS    12\" Cone taps while standing on airex 10x2   Wobble board     1' ea dir                        MODALITIES       [place] [employer]                  Other Therapeutic Activities/Education:  HEP and importance of completion    Home Exercise Program: Issued, practiced and pt demo ability to perform 8/17/2021      Manual Treatments:        Modalities:   Patient received vasocompression x10 min on low pressure. Patient had negative skin reaction afterwards. Communication with other providers:        Assessment: Pt tolerated treatment well. Pt had more difficulty with lateral heel taps today and balance in general today. Pt rated pain at 0/10 after treatment. Pt wll continue to benefit from more strengthening and balance.    Plan for Next Session: gait, quad strength, protocol in chart    Time In / Time Out:  0830/0909     Timed Code/Total Treatment Minutes: 39'/ 44' 1 TE (10') 1 TA (20') 1 Neuro (9')      Next Progress Note due:  10th visit      Plan of Care Interventions:  [x] Therapeutic Exercise  [x] Modalities:  [x] Therapeutic Activity     [] Ultrasound  [x] Estim  [x] Gait Training      [] Cervical Traction [] Lumbar Traction  [x] Neuromuscular Re-education    [x] Cold/hotpack [] Iontophoresis   [x] Instruction in HEP      [x] Vasopneumatic   [] Dry Needling    [x] Manual Therapy               [] Aquatic Therapy              Electronically signed by:  Lala Osman PTA  10/25/2021,8:28 AM     10/25/2021,4:15 PM

## 2021-10-28 ENCOUNTER — OFFICE VISIT (OUTPATIENT)
Dept: INFECTIOUS DISEASES | Age: 36
End: 2021-10-28
Payer: COMMERCIAL

## 2021-10-28 VITALS
RESPIRATION RATE: 18 BRPM | SYSTOLIC BLOOD PRESSURE: 104 MMHG | DIASTOLIC BLOOD PRESSURE: 89 MMHG | TEMPERATURE: 96.3 F | BODY MASS INDEX: 41.41 KG/M2 | WEIGHT: 280.4 LBS | HEART RATE: 37 BPM

## 2021-10-28 DIAGNOSIS — A49.01 MSSA (METHICILLIN SUSCEPTIBLE STAPHYLOCOCCUS AUREUS) INFECTION: Primary | ICD-10-CM

## 2021-10-28 DIAGNOSIS — T81.42XD INFECTION OF DEEP INCISIONAL SURGICAL SITE AFTER PROCEDURE, SUBSEQUENT ENCOUNTER: ICD-10-CM

## 2021-10-28 PROCEDURE — 99214 OFFICE O/P EST MOD 30 MIN: CPT | Performed by: INTERNAL MEDICINE

## 2021-10-28 RX ORDER — MINOCYCLINE HYDROCHLORIDE 100 MG/1
100 CAPSULE ORAL 2 TIMES DAILY
Qty: 56 CAPSULE | Refills: 0 | Status: SHIPPED | OUTPATIENT
Start: 2021-10-28 | End: 2021-12-02

## 2021-10-28 NOTE — PROGRESS NOTES
10/28/2021         Referring Physician: No ref. provider found  Primary Care Physician: Suzette Messer MD    Impression/Plan:   Diagnosis Orders   1. MSSA (methicillin susceptible Staphylococcus aureus) infection  minocycline (MINOCIN;DYNACIN) 100 MG capsule   2. Infection of deep incisional surgical site after procedure, subsequent encounter         Discussion:  MSSA (methicillin susceptible Staphylococcus aureus) infection  Completed 2 week course of linezolid and ciprofloxacin last week. Start minocycline for one month. See in 3 weeks      Return in about 3 weeks (around 11/18/2021). History: Chad Reyes is a 28 y.o.  male presenting today for follow-up. Medical history of morbid obesity, hypertension, depression. Was seen in the hospitalWith right knee pain and edema. He had a right patella tendon rupture on 6/22/2021 while he was playing baseball. He underwent a repair of the right patella tendon using them as planned 5 x 5 cm graft on 6/22/2021. His symptom started on 6/29/2021. He underwent incision and drainage of right knee with excision of dermal spine graft. Culture was positive for MSSA. He was discharged to complete a 4-week course of cefazolin on 8/2/2021. Denies nausea, vomiting, or diarrhea  9/1/2021: He completed a 4-week course of antibiotics on 8-21. Had persistent right knee abscess and underwent I&D on 8/25/2021. Culture was positive for Staph aureus; patient was put placed on Keflex 500 mg orally 4 times daily for 10 days to end on 9/4/2021. He reports that he was told that he had a retained stitch. Denies dysphagia, n/v/d/f  9/15/2021: Stitches were removed on 9/13/2021. Denies pain, fever or chills. 10/27/2021 seen in the hospital on 10/14/2021 for right knee prepatellar cellulitis and stitch abscess. Underwent right knee incision and drainage on 10/14/2021.   He was discharged from the hospital with linezolid and ciprofloxacin for 14 days, (culture results were not available at that time). Cultures have once more shown MSSA. Susceptible to moxifloxacin. improving. right knee is improving. Reports that sutures will be removed on 11/1/2021    Review of Systems   All other systems reviewed and are negative. No Known Allergies    Patient Active Problem List   Diagnosis    Essential hypertension    Situational anxiety    History of attention deficit disorder    Rupture of right patellar tendon    Cellulitis of right knee    Postoperative infection    MSSA (methicillin susceptible Staphylococcus aureus) infection    Receiving intravenous antibiotic treatment as outpatient    Morbid obesity with BMI of 40.0-44.9, adult ([facility] [address]    Infection of right knee (HCC)    Chronic infection of right knee (HCC)       Current Outpatient Medications   Medication Sig Dispense Refill    minocycline (MINOCIN;DYNACIN) 100 MG capsule Take 1 capsule by mouth 2 times daily for 28 days 56 capsule 0    sertraline (ZOLOFT) 50 MG tablet Take 1 tablet by mouth daily 90 tablet 1    lisinopril-hydroCHLOROthiazide (PRINZIDE;ZESTORETIC) 10-12.5 MG per tablet Take 1 tablet by mouth daily 30 tablet 5     No current facility-administered medications for this visit.        Past Medical History:   Diagnosis Date    Hypertension        Past Surgical History:   Procedure Laterality Date    INGUINAL HERNIA REPAIR Left 1986    KNEE SURGERY Right 8/25/2021    RIGHT KNEE INCISION AND DRAINAGE performed by Celester Lennox, DO at [address] [facility] Right 10/14/2021    RIGHT KNEE INCISION AND DRAINAGE performed by Celester Lennox, DO at [address] [facility] Right 7/6/2021    RIGHT KNEE DEBRIDEMENT INCISION AND DRAINAGE performed by Celester Lennox, DO at [address] [facility] Right 6/22/2021    RIGHT PATELLA TENDON REPAIR performed by Celester Lennox, DO at [address] [facility] History     Socioeconomic History    Marital status:      Spouse name: Not on file (122.5 kg)   10/11/21 277 lb (125.6 kg)        Physical Exam:   Gen: alert and NAD  HEENT: sclera clear, pupils equal and reactive, extra ocular muscles intact, oropharynx clear, mucus membranes moist, tympanic membranes clear bilaterally, no cervical lymphadenopathy noted and neck supple  Neck: supple, no significant adenopathy  Chest: clear to auscultation, no wheezes, rales or rhonchi, symmetric air entry  Heart: regular rate and rhythm, no murmurs  ABD: abdomen is soft without significant tenderness, masses, organomegaly or guarding. EXT:peripheral pulses normal, no pedal edema, no clubbing or cyanosis. Left arm PICC line. NEURO: alert, oriented, normal speech, no focal findings or movement disorder noted  Skin: well hydrated, no lesions, surgical site examined  Wounds: right knee surgical scar, not tender   Labs:   WBC   Date Value Ref Range Status   10/15/2021 8.3 4.0 - 10.5 K/CU MM Final   10/13/2021 7.0 4.0 - 10.5 K/CU MM Final   10/12/2021 7.9 4.0 - 10.5 K/CU MM Final     CREATININE   Date Value Ref Range Status   10/15/2021 0.6 (L) 0.9 - 1.3 MG/DL Final   10/15/2021 0.8 (L) 0.9 - 1.3 MG/DL Final   10/13/2021 0.8 (L) 0.9 - 1.3 MG/DL Final       Cultures:  Culture   Date Value Ref Range Status   10/14/2021 Final Report No anaerobes isolated  Final   10/14/2021 (A)  Final    STAPHYLOCOCCUS AUREUS Rare growth No further workup Refer to (culture [id number] and [id number] 10/14/21) for sensitivity results   10/14/2021 Final Report No anaerobes isolated  Final   10/14/2021 STAPHYLOCOCCUS AUREUS Rare growth (A)  Final       Imaging Studies: This dictation was created with voice recognition software. While attempts have been made to review the dictation as it is transcribed, on occasion the spoken word can be misinterpreted by the technology leading to omissions or inappropriate words, phrases or sentences.     Electronically signed by: Electronically signed by Bia Eric MD on 10/28/2021 at 9:52 PM, 10/28/2021 9:52 PM

## 2021-10-29 NOTE — ASSESSMENT & PLAN NOTE
Completed 2 week course of linezolid and ciprofloxacin last week. Start minocycline for one month.  See in 3 weeks

## 2021-11-01 ENCOUNTER — HOSPITAL ENCOUNTER (OUTPATIENT)
Dept: PHYSICAL THERAPY | Age: 36
Setting detail: THERAPIES SERIES
Discharge: HOME | End: 2021-11-01
Payer: COMMERCIAL

## 2021-11-01 ENCOUNTER — OFFICE VISIT (OUTPATIENT)
Dept: ORTHOPEDIC SURGERY | Age: 36
End: 2021-11-01

## 2021-11-01 VITALS
BODY MASS INDEX: 41.47 KG/M2 | RESPIRATION RATE: 16 BRPM | HEART RATE: 73 BPM | OXYGEN SATURATION: 96 % | WEIGHT: 280 LBS | HEIGHT: 69 IN

## 2021-11-01 DIAGNOSIS — Z09 POSTOP CHECK: Primary | ICD-10-CM

## 2021-11-01 PROCEDURE — 99024 POSTOP FOLLOW-UP VISIT: CPT | Performed by: PHYSICIAN ASSISTANT

## 2021-11-01 PROCEDURE — 97110 THERAPEUTIC EXERCISES: CPT

## 2021-11-01 PROCEDURE — 97530 THERAPEUTIC ACTIVITIES: CPT

## 2021-11-01 NOTE — PATIENT INSTRUCTIONS
Sutures removed  Weightbearing and activities as tolerated  May take Ibuprofen or Motrin as needed  Rest, ice, and elevate as needed  Work on ROM and strengthening exercises as discussed  Follow up in 4 weeks

## 2021-11-01 NOTE — PROGRESS NOTES
Outpatient Physical Therapy           [place]           [x] Phone: [phone]   Fax: [phone]  [place]           [] Phone: [phone]   Fax: [phone]      To:   Mitchell Chavira DO    From: BILL Taylor     Patient: Luis Carlos Diaz                  : 1985  Diagnosis:    Postop check. R patella tendon repair. R knee I&D  Date: 2021  Treatment Diagnosis:      RLE weakness, impaired gait      [x]  Progress Note                []  Discharge Note    Evaluation Date:  21  Total Visits to date: 13   Cancels/No-shows to date:  0    Subjective:  Pt stated that he has no pain today after working over the weekend and states that he just had his stitches taken out this morning before the visit. Pt reports the occasional feeling of his knee giving out, but does say it is feeling like it is getting better. Pt reports some difficulty with going down stairs due to feelings of instability, but denies experiencing pain. LEFS: 71/80       Plan of Care/Treatment to date:  [x] Therapeutic Exercise    [x] Modalities:  [x] Therapeutic Activity     [x] Ultrasound  [x] Electrical Stimulation  [x] Gait Training      [] Cervical Traction   [] Lumbar Traction  [x] Neuromuscular Re-education  [x] Cold/hotpack [] Iontophoresis  [x] Instruction in HEP      Other:  [x] Manual Therapy       [x]  Vasopneumatic  [] Aquatic Therapy       []   Dry Needle Therapy                      Objective/Significant Findings At Last Visit/Comments:    MMT:   R Hip Flexion: 5/5  R Hip ABduction: 5/5  R Hip ADduction: 5/5  R Knee Flexion: 5/5  R Knee Extension: 5/5    AROM:  Knee ext: 0 deg  Knee flex: 121deg       Gait: No abnormalities/deviations. Demonstrates equal stance time. Assessment:   Pt has shown continued progression in strength, mobility, and overall function. Pts goals were reassessed today and was able to meet all given goals indicating progress since goals were last reassessed.  Pt still demonstrates some weakness with eccentric quad control, with complaints of instability while descending stairs. Pt also had slight set back due to surgery for infection that is now under control. Pt will benefit from continued therapy to progress toward new goals, improve quad strength/control, and to return to PLOF. Goal Status:  [x] Achieved [] Partially Achieved  [x] New goal     Changes to goals:    Patient goals : improve mobility of right leg and get back to work  Short term goals  Time Frame for Short term goals: 5 visits  Short term goal 1: Pt will be IND with HEP in order to maximize recovery outside of clinic MET 9/22  Long term goals  Time Frame for Long term goals : 10 visits  Long term goal 1: Pt will improve R knee AROM from 0-115 to aide in stairs MET 11/1  Long term goal 2: Pt will improve gross RLE strength to 4+/5 to aide in ADLs Met 11/1  Long term goal 3: Pt will improve LEFS to 65 or higher to show [address] and subjective improvement Met 11/1  Long term goal 4: Pt will ambulate into clinic with no brace, no deviations and equal stance time Met 11/1    Long term goal 5: Pt will descend stairs in a reciprocal pattern without use of handrail with no feelings of  Instability  New goal on 11/1       Frequency/Duration:  # Days per week: [] 1 day # Weeks: [] 1 week [] 4 weeks [] 8 weeks     [x] 2 days   [] 2 weeks [] 5 weeks [] 10 weeks     [] 3 days   [x] 3 weeks [] 6 weeks [] 12 weeks       Rehab Potential: [] Excellent [x] Good [] Fair  [] Poor       Patient Status: [] Continue per initial plan of Care     [] Patient now discharged     [x] Additional visits requested, Please re-certify for additional visits:      Requested frequency/duration:  2 /week for 3 weeks    If we are requesting more visits, we fully anticipate the patient's condition is expected to improve within the treatment timeframe we are requesting.     Electronically signed by:  BILL Horta, 11/1/2021, 8:51 AM    If you have any questions

## 2021-11-01 NOTE — PROGRESS NOTES
Date of surgery:   10/14/2021  Surgeon: Dr. García Asp    History:  Mr. Puja Jeffery is here in follow up regarding his repeat I&D of right knee. He states that he is doing well and feels that the knee is clearing up. He finished up one antibiotic and is now taking an different antibiotic prescribed by Dr. Tammi Sy. He denies any fever. Physical:  Vitals:    11/01/21 0821   Pulse: 73   Resp: 16   SpO2: 96%   Weight: 280 lb (127 kg)   Height: 5' 9\" (1.753 m)     Right knee incision is intact. Prolene sutures (8) in place. There is no packing material left in the knee. Range of motion 0-120 degrees      Impression: Status post I&D of right knee    Plan:   All 8 sutures were removed fully.   Patient Instructions   Sutures removed  Weightbearing and activities as tolerated  May take Ibuprofen or Motrin as needed  Rest, ice, and elevate as needed  Work on ROM and strengthening exercises as discussed  Follow up in 4 weeks

## 2021-11-01 NOTE — FLOWSHEET NOTE
Outpatient Physical Therapy  [place]           [x] Phone: [phone]   Fax: [phone]  [place]           [] Phone: [phone]   Fax: [phone]        Physical Therapy Daily Treatment Note  Date:  2021    Patient Name:  Rosenda Sommers    :  1985  MRN: [id number]  Restrictions/Precautions:   follow protocol  Diagnosis:   Diagnosis: Postop check. R patella tendon repair. R knee I&D  Date of Injury/Surgery: 21 repair. 21 debridment. Treatment Diagnosis: Treatment Diagnosis: RLE weakness, impaired gait    Insurance/Certification information: PT Insurance Information: UMR - 60 PCY   Referring Physician:  Referring Practitioner: DO Mateusz Ortiz Doctor Visit:    Plan of care signed (Y/N):  Y  Outcome Measure: LEFS: 80  Visit# / total visits:  15/18  Pain level: 0/10     Goals:     Patient goals : improve mobility of right leg and get back to work  Short term goals  Time Frame for Short term goals: 5 visits  Short term goal 1: Pt will be IND with HEP in order to maximize recovery outside of clinic MET   Long term goals  Time Frame for Long term goals : 10 visits  Long term goal 1: Pt will improve R knee AROM from 0-115 to aide in stairs MET   Long term goal 2: Pt will improve gross RLE strength to 4+/5 to aide in ADLs Met   Long term goal 3: Pt will improve LEFS to 65 or higher to show [address] and subjective improvement Met   Long term goal 4: Pt will ambulate into clinic with no brace, no deviations and equal stance time Met   Long term goal 5: Pt will descend stairs in a reciprocal pattern without use of handrail with no feelings of  Instability  New goal on        Summary of Evaluation: Assessment: Pt is a 24-year-old male who presents to clinic following a R patellar tendon repair on 21, as well as debridement, incision and drainage of infection on 21.  Upon assessment, pt presents with impaired gait, impaired balance, impaired R knee strength and ROM and overall reduced independent with ADL/ iADL and work-related tasks. Pt would benefit from skilled therapy interventions to address listed impairments, progress toward goal completion and improve ADL/IADL status. PT also warranted to reduce risk for further injury or decline. Subjective:  Pt stated that he has no pain today after working over the weekend and states that he just had his stitches taken out this morning before the visit. Pt says they put him on another month of antibiotics. Pt reports the occasional feeling of his knee giving out, but does say it is getting better. Pt reports some difficulty with going down stairs due to feelings of instability. LEFS: 71/80  Any changes in Ambulatory Summary Sheet? None      Objective: COVID screening questions were asked and patient attested that there had been no contact or symptoms   MMT:   R Hip Flexion: 5/5  R Hip ABduction: 5/5  R Hip ADduction: 5/5  R Knee Flexion: 5/5  R Knee Extension: 5/5    AROM:  Knee ext: 0 deg  Knee flex: 121deg  Has ACE wrap around knee   Gait: No abnormalities/deviations. Demonstrates equal stance time.   Exercises: (No more than 4 columns)   Exercise/Equipment 10/11/21 #12 10/19/21 #13 10/25/2021 #14 11/1/2021 #14            WARM UP       Nu-step        Elliptical  x 5' x5' 5' 5'   TABLE       SAQ       LAQ 2x10, 1#      SLR 2x10, 1#  0# 5x     Single leg bridge 10x2  10x 2x10   S/L hip abduction  10x2  0# 10x2              STANDING       STS       TKE with band        Anterior step up    6\" w/ airex on top 10x2    Hamstring curl machine    DL 50# 10 x2   R LE 25 # 10x2 DL 60# 2x10     Shuttle leg press    4C 10x2 DL  2C 10x2 R only 4C BL x10  5C BL x10  2C 2x10   SL squat to table x10 RLE from elevated table   2x10 RLE from elevated table   Machines downstairs  Leg press  Leg curl  Leg ext Press: 2x10 77#  Curl: 44# 2x10  Ext: 22# 2x10  --    Lateral heel tap   4\" 10x2 w/ UE support 4\" 2x10 No UE support Lateral step up   6\" w/ airex on top 10x2 6\" x10  8\"x10   FT retro walk        FT fwd walk          lunges    2x10   Step downs    DO NEXT TIME   PROPRIOCEPTION       SLS   12\" Cone taps while standing on airex 10x2    Wobble board    1' ea dir 1' AP   1' M/L                        MODALITIES       [facility]                  Other Therapeutic Activities/Education:  HEP and importance of completion    Home Exercise Program: Issued, practiced and pt demo ability to perform 8/17/2021      Manual Treatments:  None      Modalities: None     Communication with other providers:  Progress note sent    Assessment: Pt tolerated treatment very well today, with no increases in pain from any of the given exercises. Pt was able to increase the resistance and quantity in some of the exercises (see above), without any complaints of pain or difficulty. Pt needed cueing on proper form with lunges to not lift his heel of the front foot when going into the lunge position. Pt still has some deficits in eccentric quad control. Pt has shown continued progression in strength and ROM and will benefit from continued therapy to address remaining strength deficits, decreased mobility, and progress toward returning to PLOF.   End pain: 0/10   Plan for Next Session: gait, quad strength, protocol in chart    Time In / Time Out:  0900/0945     Timed Code/Total Treatment Minutes: 45'/ 45': 35' TE x 2 ; 10' TA x 1; 2' Neuro x 0    Next Progress Note due:  10th visit      Plan of Care Interventions:  [x] Therapeutic Exercise  [x] Modalities:  [x] Therapeutic Activity     [] Ultrasound  [x] Estim  [x] Gait Training      [] Cervical Traction [] Lumbar Traction  [x] Neuromuscular Re-education    [x] Cold/hotpack [] Iontophoresis   [x] Instruction in HEP      [x] Vasopneumatic   [] Dry Needling    [x] Manual Therapy               [] Aquatic Therapy              Electronically signed by:  BILL Sommer  11/1/2021,8:46 AM

## 2021-11-09 ENCOUNTER — HOSPITAL ENCOUNTER (OUTPATIENT)
Dept: PHYSICAL THERAPY | Age: 36
Setting detail: THERAPIES SERIES
Discharge: HOME | End: 2021-11-09
Payer: COMMERCIAL

## 2021-11-09 ENCOUNTER — APPOINTMENT (OUTPATIENT)
Dept: GENERAL RADIOLOGY | Age: 36
End: 2021-11-09
Payer: COMMERCIAL

## 2021-11-09 ENCOUNTER — HOSPITAL ENCOUNTER (EMERGENCY)
Age: 36
Discharge: HOME | End: 2021-11-09
Payer: COMMERCIAL

## 2021-11-09 VITALS
DIASTOLIC BLOOD PRESSURE: 93 MMHG | HEART RATE: 87 BPM | SYSTOLIC BLOOD PRESSURE: 142 MMHG | OXYGEN SATURATION: 100 % | WEIGHT: 280 LBS | TEMPERATURE: 98.3 F | HEIGHT: 69 IN | BODY MASS INDEX: 41.47 KG/M2 | RESPIRATION RATE: 18 BRPM

## 2021-11-09 DIAGNOSIS — Z51.89 VISIT FOR WOUND CHECK: Primary | ICD-10-CM

## 2021-11-09 PROCEDURE — 73560 X-RAY EXAM OF KNEE 1 OR 2: CPT

## 2021-11-09 PROCEDURE — 97110 THERAPEUTIC EXERCISES: CPT

## 2021-11-09 PROCEDURE — 97112 NEUROMUSCULAR REEDUCATION: CPT

## 2021-11-09 PROCEDURE — 97530 THERAPEUTIC ACTIVITIES: CPT

## 2021-11-09 PROCEDURE — 99285 EMERGENCY DEPT VISIT HI MDM: CPT

## 2021-11-09 PROCEDURE — 6370000000 HC RX 637 (ALT 250 FOR IP): Performed by: PHYSICIAN ASSISTANT

## 2021-11-09 RX ORDER — DIAPER,BRIEF,INFANT-TODD,DISP
EACH MISCELLANEOUS ONCE
Status: COMPLETED | OUTPATIENT
Start: 2021-11-09 | End: 2021-11-09

## 2021-11-09 RX ADMIN — BACITRACIN ZINC 1 G: 500 OINTMENT TOPICAL at 21:41

## 2021-11-09 ASSESSMENT — PAIN DESCRIPTION - PAIN TYPE: TYPE: ACUTE PAIN

## 2021-11-09 ASSESSMENT — PAIN SCALES - GENERAL: PAINLEVEL_OUTOF10: 5

## 2021-11-09 NOTE — FLOWSHEET NOTE
Outpatient Physical Therapy  [place]           [x] Phone: [phone]   Fax: [phone]  [facility] S[facility]           [] Phone: [phone]   Fax: [phone]        Physical Therapy Daily Treatment Note  Date:  2021    Patient Name:  Elba Villa    :  1985  MRN: [id number]  Restrictions/Precautions:   follow protocol  Diagnosis:   Diagnosis: Postop check. R patella tendon repair. R knee I&D  Date of Injury/Surgery: 21 repair. 21 debridment. Treatment Diagnosis: Treatment Diagnosis: RLE weakness, impaired gait    Insurance/Certification information: PT Insurance Information: U[employer] - 60 PCY   Referring Physician:  Referring Practitioner: DO Mateusz Jack Doctor Visit:  2021   Plan of care signed (Y/N):  Y  Outcome Measure: LEFS: 56/80  Visit# / total visits:  18  Pain level: 0/10     Goals:     Patient goals : improve mobility of right leg and get back to work  Short term goals  Time Frame for Short term goals: 5 visits  Short term goal 1: Pt will be IND with HEP in order to maximize recovery outside of clinic MET   Long term goals  Time Frame for Long term goals : 10 visits  Long term goal 1: Pt will improve R knee AROM from 0-115 to aide in stairs MET   Long term goal 2: Pt will improve gross RLE strength to 4+/5 to aide in ADLs Met   Long term goal 3: Pt will improve LEFS to 65 or higher to show [address] and subjective improvement Met   Long term goal 4: Pt will ambulate into clinic with no brace, no deviations and equal stance time Met   Long term goal 5: Pt will descend stairs in a reciprocal pattern without use of handrail with no feelings of  Instability  New goal on        Summary of Evaluation: Assessment: Pt is a 77-year-old male who presents to clinic following a R patellar tendon repair on 21, as well as debridement, incision and drainage of infection on 21.  Upon assessment, pt presents with impaired gait, impaired balance, impaired R knee strength and ROM and overall reduced independent with ADL/ iADL and work-related tasks. Pt would benefit from skilled therapy interventions to address listed impairments, progress toward goal completion and improve ADL/IADL status. PT also warranted to reduce risk for further injury or decline. Subjective:  Pt stated that he is not having any pain today. Pt stated that he has had episodes of his knee giving out, but he did not fall. Pt stated that he is still on antibiotics. Pt stated that he only wears the ACE wrap in therapy to protect it. Pt stated that he sees Dr. Guillaume Buckley in December. LEFS: 71/80  Any changes in Ambulatory Summary Sheet?   None      Objective: COVID screening questions were asked and patient attested that there had been no contact or symptoms  AROM:  Knee ext: 0 deg  Knee flex: 121deg  Has ACE wrap around knee     Pt continues to struggle     Exercises: (No more than 4 columns)   Exercise/Equipment 10/19/21 #13 10/25/2021 #14 11/1/2021 #15 11/9/2021 #16            WARM UP       Nu-step        Elliptical x5' 5' 5' 5'   TABLE       SAQ       LAQ       SLR  0# 5x      Single leg bridge  10x 2x10 10x2   S/L hip abduction   0# 10x2               STANDING       STS       TKE with band        Anterior step up   6\" w/ airex on top 10x2  8\" 10x2   Hamstring curl machine   DL 50# 10 x2   R LE 25 # 10x2 DL 60# 2x10   DL 60# 2x10  R only 30# 10x2   Shuttle leg press   4C 10x2 DL  2C 10x2 R only 4C BL x10  5C BL x10  2C 2x10 5C 10x2 DL   2C  10x2 R only   SL squat to table   2x10 RLE from elevated table 24\" 10x2 R LE    Machines downstairs  Leg press  Leg curl  Leg ext  --     Lateral heel tap  4\" 10x2 w/ UE support 4\" 2x10 No UE support 4\" 10x2 w/  No UE support   Lateral step up  6\" w/ airex on top 10x2 6\" x10  8\"x10 8\" 10x2    FT retro walk        FT fwd walk          lunges   2x10    Step downs   DO NEXT TIME 6\" 10x2 ant step down    PROPRIOCEPTION       SLS  12\" [place] taps while standing on airex 10x2     Wobble board   1' ea dir 1' AP   1' M/L 1' AP   1' M/L                        MODALITIES       [place] E-stim                  Other Therapeutic Activities/Education:  HEP and importance of completion    Home Exercise Program: Issued, practiced and pt demo ability to perform 8/17/2021      Manual Treatments:  None      Modalities: None     Communication with other providers:  Progress note sent    Assessment: Pt tolerated treatment well. Pt was able to advance activity with no issues. Pt will continue to benefit from more strengthening. Pt rated pain at  0/10 after treatment.    Plan for Next Session: gait, quad strength, protocol in chart    Time In / Time Out: 1030/1115     Timed Code/Total Treatment Minutes: 45'/ 45'1 TE (20') 1 TA (10') 1 neuro (15')  Next Progress Note due:  10th visit      Plan of Care Interventions:  [x] Therapeutic Exercise  [x] Modalities:  [x] Therapeutic Activity     [] Ultrasound  [x] Estim  [x] Gait Training      [] Cervical Traction [] Lumbar Traction  [x] Neuromuscular Re-education    [x] Cold/hotpack [] Iontophoresis   [x] Instruction in HEP      [x] Vasopneumatic   [] Dry Needling    [x] Manual Therapy               [] Aquatic Therapy              Electronically signed by:  Rony Camacho PTA  11/9/2021,10:23 AM      11/9/2021,12:44 PM

## 2021-11-10 ENCOUNTER — TELEPHONE (OUTPATIENT)
Dept: ORTHOPEDIC SURGERY | Age: 36
End: 2021-11-10

## 2021-11-10 ENCOUNTER — OFFICE VISIT (OUTPATIENT)
Dept: ORTHOPEDIC SURGERY | Age: 36
End: 2021-11-10

## 2021-11-10 VITALS
HEART RATE: 84 BPM | HEIGHT: 69 IN | BODY MASS INDEX: 39.93 KG/M2 | DIASTOLIC BLOOD PRESSURE: 84 MMHG | SYSTOLIC BLOOD PRESSURE: 132 MMHG | WEIGHT: 269.6 LBS | RESPIRATION RATE: 16 BRPM | TEMPERATURE: 99 F | OXYGEN SATURATION: 99 %

## 2021-11-10 DIAGNOSIS — Z09 POSTOP CHECK: Primary | ICD-10-CM

## 2021-11-10 PROCEDURE — 99024 POSTOP FOLLOW-UP VISIT: CPT | Performed by: PHYSICIAN ASSISTANT

## 2021-11-10 NOTE — PROGRESS NOTES
Just as a precaution I will have him come back in about 10 days and will reevaluate the knee. At this point I instructed him to have normal hygiene and wash the incision with soap and water and leave the incision open to air. He may use bacitracin ointment intermittently. Call the office for any changes within the knee such as increasing swelling, pain, active drainage.

## 2021-11-10 NOTE — LETTER
[facility]  [address] [facility]  Phone: [phone]  Fax: [phone]    Concepcion Wetzel        November 10, 2021     Patient: Ophelia Mccollum   YOB: 1985   Date of Visit: 11/10/2021       To Whom it May Concern:    Sherri Singleton has an appointment in the office today with 11/10/2021 at 3:30pm for an appointment for his right knee. If you have any questions or concerns, please don't hesitate to call.     Sincerely,         Cecile Xiao PA-C

## 2021-11-10 NOTE — ED PROVIDER NOTES
injuries, including chest wall and back.   Integument: See HPI    All other review of systems are negative  See HPI and nursing notes for additional information     PAST MEDICAL & SURGICAL HISTORY    Past Medical History:   Diagnosis Date    Hypertension      Past Surgical History:   Procedure Laterality Date    INGUINAL HERNIA REPAIR Left 1986    KNEE SURGERY Right 8/25/2021    RIGHT KNEE INCISION AND DRAINAGE performed by Allison Castrejon DO at [address] [facility] Right 10/14/2021    RIGHT KNEE INCISION AND DRAINAGE performed by Allison Castrejon DO at [address] [facility] Right 7/6/2021    RIGHT KNEE DEBRIDEMENT INCISION AND DRAINAGE performed by Allison Castrejon DO at [address] [facility] Right 6/22/2021    RIGHT PATELLA TENDON [facility]PAIR [facility] [address] [facility]    Current Outpatient Rx   Medication Sig Dispense Refill    minocycline (MINOCIN;DYNACIN) 100 MG capsule Take 1 capsule by mouth 2 times daily for 28 days 56 capsule 0    sertraline (ZOLOFT) 50 MG tablet Take 1 tablet by mouth daily 90 tablet 1    lisinopril-hydroCHLOROthiazide (PRINZIDE;ZESTORETIC) 10-12.5 MG per tablet Take 1 tablet by mouth daily 30 tablet 5       ALLERGIES    No Known Allergies    SOCIAL & FAMILY HISTORY    Social History     Socioeconomic History    Marital status:      Spouse name: None    Number of children: None    Years of education: None    Highest education level: None   Occupational History    None   Tobacco Use    Smoking status: Never Smoker    Smokeless tobacco: Former User     Types: Chew   Vaping Use    Vaping Use: Never used   Substance and Sexual Activity    Alcohol use: Yes     Comment: Occasionally    Drug use: Never    Sexual activity: Yes     Partners: Female   Other Topics Concern    None   Social History Narrative    None     Social Determinants of Health     Financial Resource Strain: Low Risk     Difficulty of Paying Living Expenses: Not hard at all   Food Insecurity: No Food Insecurity    Worried About [address] [place] [address] in the Last Year: Never true    Ran Out of Food in the Last Year: Never true   Transportation Needs: No Transportation Needs    Lack of Transportation (Medical): No    Lack of Transportation (Non-Medical): No   Physical Activity:     Days of Exercise per Week: Not on file    Minutes of Exercise per Session: Not on file   Stress:     Feeling of Stress : Not on file   Social Connections:     Frequency of Communication with Friends and Family: Not on file    Frequency of Social Gatherings with Friends and Family: Not on file    Attends Adventism Services: Not on file    Active Member of [address] [place] [address] or Organizations: Not on file    Attends Club or Organization Meetings: Not on file    Marital Status: Not on file   Intimate Partner Violence:     Fear of Current or Ex-Partner: Not on file    Emotionally Abused: Not on file    Physically Abused: Not on file    Sexually Abused: Not on file   Housing Stability:     Unable to Pay for Housing in the Last Year: Not on file    Number of [place] in the Last Year: Not on file    Unstable Housing in the Last Year: Not on file     Family History   Problem Relation Age of Onset    Other Mother         Hodgekin's    Cancer Father         Testicular           PHYSICAL EXAM    VITAL SIGNS: BP (!) 142/93   Pulse 87   Temp 98.3 °F (36.8 °C) (Oral)   Resp 18   Ht 5' 9\" (1.753 m)   Wt 280 lb (127 kg)   SpO2 100%   BMI 41.35 kg/m²   Constitutional:  Well developed, Appears comfortable    Musculoskeletal:    Right knee exam:   -Inspection:  No obvious defects or deformities. Vertically oriented surgical wound of anterior right knee presents appears mostly well-healed except for slight scabbing of the wound proximally with 0.3 cm superficial opening of wound without surrounding erythema, induration. There is no drainage from the affected area.    - Palpation: No redness. No palpable increased warmth to palpation at this time of affected area of knee. No palpable effusion. No tenderness to palpation of knee. -ROM:  Extension - Has full extension (Extensor mechanism intact)    Flexion - Mildly limited    No swelling, discoloration, tenderness to palpation of lower leg, or range of motion deficit of the ipsilateral ankle. Compartments are soft in affected extremity. Strength 5/5 in affected extremity. Affected extremity is distally neurovascularly intact. Vascular: Distal pulses (DP, PT) intact on affected side. Capillary refill intact. Integument:  Well hydrated, no rash. See knee exam for right knee integument. Neurologic:  Awake and alert, normal flow of speech. Distal sensation, motor intact. Psychiatric: Cooperative, pleasant affect        RADIOLOGY/PROCEDURES    XR KNEE RIGHT (1-2 VIEWS)   Final Result   1. Persistent anterior right knee soft tissue swelling most prominent   anterior to the patellar tendon potentially due to cellulitis given the   clinical findings, although subcutaneous edema and contusion could appear   similar. No underlying findings of necrotizing fasciitis or osteomyelitis. 2. Indistinct margins of the right patellar tendon likely due to obscuring by   overlying inflammation or edema with underlying strain injury not excluded. 3. Minimal bicompartmental osteoarthritic changes of the right knee as above   associated with trace suprapatellar effusion. ED COURSE & MEDICAL DECISION MAKING       Vital signs and nursing notes reviewed during ED course. I have independently evaluated this patient. Supervising physician present in the Emergency Department, available for consultation, throughout entirety of patient care. Patient presents as above which prompted work-up in the ED today with right knee x-ray.   Right knee x-ray reveals persistent anterior right knee soft tissue swelling without evidence of necrotizing fasciitis or osteomyelitis. Patient denies any new or worsening edema which lowers my clinical suspicion for cellulitis given that this is a persistent finding. Surgical wound is overall quite well-appearing. There is no induration, purulent drainage, active drainage present at this time. I consulted patient's orthopedist, Dr. Xuan Shipman, and call was returned by on-call orthopedist, Dr. Gin Chopra, and we discussed the patient's case. As surgical wound is well-appearing and vital signs are reassuring he recommends close follow-up in the office for recheck with Dr. Xuan Shipman. Wound treated with bacitracin in the ED and wound dressing applied. Recommend triple antibiotic ointment over the affected area and patient very closely watching for signs and symptoms of wound infection as well as having close outpatient follow-up with his orthopedist.  He voices understanding and is comfortable with this plan. Affected extremity is distally neurovascularly intact. I have low clinical suspicion for septic joint at this time. Patient is nontoxic appearing. Vital signs stable. Patient is stable for outpatient management at this time. All pertinent Lab data and radiographic results reviewed with patient at bedside. The patient and/or the family were informed of the results of any tests/labs/imaging, the treatment plan, and time was allotted to answer questions. Diagnosis, disposition, and plan discussed in detail with patient and/or family who understands and agrees. Patient agrees to follow up with his orthopedist for recheck as soon as possible. Patient agrees to return to emergency department if symptoms worsen or any new symptoms develop including but not limited to numbness, tingling, weakness, pain out of proportion, pallor of limb, coolness of limb, slow cap refill (brisk cap refill was demonstrated to patient today), warmth of joints, redness of joints, fever, chills.           Clinical  IMPRESSION 1. Visit for wound check          Disposition referral (if applicable):  Erica Orr DO  [address]  [phone]    Schedule an appointment as soon as possible for a visit   Recheck as soon as possible    [facility] Emergency Department  [address] [facility] [address]  [phone]  Go to   Return to ED if symptoms worsen or new symptoms      Disposition medications (if applicable):  Discharge Medication List as of 11/9/2021  9:30 PM            Comment: Please note this report has been produced using speech recognition software and may contain errors related to that system including errors in grammar, punctuation, and spelling, as well as words and phrases that may be inappropriate. If there are any questions or concerns please feel free to contact the dictating provider for clarification.          Marylin Garcia PA-C  11/09/21 9052

## 2021-11-18 ENCOUNTER — OFFICE VISIT (OUTPATIENT)
Dept: INFECTIOUS DISEASES | Age: 36
End: 2021-11-18
Payer: COMMERCIAL

## 2021-11-18 VITALS
HEART RATE: 76 BPM | SYSTOLIC BLOOD PRESSURE: 139 MMHG | RESPIRATION RATE: 18 BRPM | WEIGHT: 279.8 LBS | TEMPERATURE: 97.4 F | DIASTOLIC BLOOD PRESSURE: 67 MMHG | BODY MASS INDEX: 41.32 KG/M2

## 2021-11-18 DIAGNOSIS — M00.9 INFECTION OF RIGHT KNEE (HCC): Primary | ICD-10-CM

## 2021-11-18 DIAGNOSIS — A49.01 MSSA (METHICILLIN SUSCEPTIBLE STAPHYLOCOCCUS AUREUS) INFECTION: ICD-10-CM

## 2021-11-18 PROCEDURE — 99213 OFFICE O/P EST LOW 20 MIN: CPT | Performed by: INTERNAL MEDICINE

## 2021-11-18 RX ORDER — DOXYCYCLINE HYCLATE 100 MG
100 TABLET ORAL 2 TIMES DAILY
Qty: 60 TABLET | Refills: 2 | Status: SHIPPED | OUTPATIENT
Start: 2021-11-18 | End: 2022-02-17 | Stop reason: SDUPTHER

## 2021-11-19 NOTE — ASSESSMENT & PLAN NOTE
Doing well and continues to receive minocycline. Retention of FibroWire suture (polyethylene and polyester) acts of nidus of infection (if colonized with MSSA). Will need chronic antibiotic suppression with doxycycline. Will discuss with Dr. Sudhakar Espinoza about how long retained suture should remain.

## 2021-11-22 ENCOUNTER — OFFICE VISIT (OUTPATIENT)
Dept: ORTHOPEDIC SURGERY | Age: 36
End: 2021-11-22

## 2021-11-22 ENCOUNTER — HOSPITAL ENCOUNTER (OUTPATIENT)
Dept: PHYSICAL THERAPY | Age: 36
Setting detail: THERAPIES SERIES
Discharge: HOME | End: 2021-11-22
Payer: COMMERCIAL

## 2021-11-22 VITALS — HEIGHT: 69 IN | WEIGHT: 279 LBS | RESPIRATION RATE: 15 BRPM | BODY MASS INDEX: 41.32 KG/M2

## 2021-11-22 DIAGNOSIS — Z09 S/P ORTHOPEDIC SURGERY, FOLLOW-UP EXAM: Primary | ICD-10-CM

## 2021-11-22 PROCEDURE — 97530 THERAPEUTIC ACTIVITIES: CPT

## 2021-11-22 PROCEDURE — 97110 THERAPEUTIC EXERCISES: CPT

## 2021-11-22 PROCEDURE — 99024 POSTOP FOLLOW-UP VISIT: CPT | Performed by: ORTHOPAEDIC SURGERY

## 2021-11-22 ASSESSMENT — ENCOUNTER SYMPTOMS
COLOR CHANGE: 0
BACK PAIN: 0
CHEST TIGHTNESS: 0

## 2021-11-22 NOTE — PATIENT INSTRUCTIONS
Continue weight-bearing as tolerated. Continue range of motion exercises as instructed. Ice and elevate as needed. Tylenol or Motrin for pain.   Follow up in 3 months  Call our office if there is any concerns

## 2021-11-22 NOTE — PROGRESS NOTES
Subjective:      Patient ID: Marya Martinez is a 39 y.o. male. Patient presents to the office today for Fu of the right knee wound check. Pt states overall he is doing well today and denies having any pain. Pt states he was having some redness and drainage of the right knee. Pt is currently on an antibiotic      He comes in today for a recheck of his right knee, he is now about 3 weeks out from his most recent I&D of the right knee. He states that overall he is not having any pain or issues with his right knee and his swelling is continuing to improve. He does continue his oral antibiotics per infectious disease who recommends that he continue with antibiotics for possibly all the way up to a year. Patient denies any new injury to the involved extremity/ joint, denies numbness or tingling in the involved extremity and denies fever or chills. Wound Check        Review of Systems   Constitutional: Negative for activity change, chills and fever. Respiratory: Negative for chest tightness. Cardiovascular: Negative for chest pain. Musculoskeletal: Negative for arthralgias, back pain, gait problem, joint swelling and myalgias. Skin: Negative for color change, pallor, rash and wound. Neurological: Negative for weakness and numbness. Past Medical History:   Diagnosis Date    Hypertension        Objective:   Physical Exam  Constitutional:       Appearance: He is well-developed. HENT:      Head: Normocephalic. Eyes:      Pupils: Pupils are equal, round, and reactive to light. Pulmonary:      Effort: Pulmonary effort is normal.   Musculoskeletal:         General: No swelling, tenderness, deformity or signs of injury. Normal range of motion. Cervical back: Normal range of motion. Right hip: Normal.      Left hip: Normal.      Right knee: Erythema present. No swelling, deformity, effusion, lacerations or bony tenderness. Normal range of motion. No tenderness.  No medial joint line, lateral joint line, MCL or LCL tenderness. No LCL laxity or MCL laxity. Normal alignment and normal patellar mobility. Left knee: Normal. No swelling, deformity, effusion, erythema, ecchymosis, lacerations or bony tenderness. Normal range of motion. No tenderness. No medial joint line, lateral joint line, MCL, LCL or patellar tendon tenderness. No LCL laxity or MCL laxity. Normal alignment and normal patellar mobility. Skin:     General: Skin is warm and dry. Capillary Refill: Capillary refill takes less than 2 seconds. Coloration: Skin is not pale. Findings: No erythema or rash. Neurological:      Mental Status: He is alert and oriented to person, place, and time. Right knee-Incision clean, dry, intact, with no erythema, no drainage, and no signs of infection. 0-140      Assessment:       Right knee I&D, 3 weeks  Right knee patellar tendon repair, 5 months      Plan:       I discussed with him today that he is continuing to progress extremely well. Continue oral antibiotics per infectious disease. I did discuss the possible antibiotic holiday in either 3 months, 6 months or 12 months per infectious disease. I explained to him that if his infection does recur the next step would be to consider a radical debridement with arthrotomy, removal of the final suture and possible wound closure or even possibly a wound VAC if needed. At this point given his improvement I recommend that we continue with chronic suppressive antibiotics. Continue weight-bearing as tolerated. Continue range of motion exercises as instructed. Ice and elevate as needed. Tylenol or Motrin for pain. Follow up in 3 months for recheck.             Celia Ballesteros, DO

## 2021-11-22 NOTE — PROGRESS NOTES
Patient presents to the office today for Fu of the right knee wound check. Pt states overall he is doing well today and denies having any pain. Pt states he was having some redness and drainage of the right knee.  Pt is currently on an antibiotic

## 2021-11-22 NOTE — FLOWSHEET NOTE
R knee strength and ROM and overall reduced independent with ADL/ iADL and work-related tasks. Pt would benefit from skilled therapy interventions to address listed impairments, progress toward goal completion and improve ADL/IADL status. PT also warranted to reduce risk for further injury or decline. Subjective:  Pt arrives with no complaints of pain and denies any pain since last visit. Pt thought he had a flare up of staph around the time of last visit, so he went to ER and it was just irritated. Any changes in Ambulatory Summary Sheet? None      Objective: COVID screening questions were asked and patient attested that there had been no contact or symptoms        Has ACE wrap around knee     FT sidestep to R more difficult than L-- trouble with Ecc deceleration  ecc quad control with step up and over can be improved. Jogging: decr stance time on R LE. No pain, but pt says it feels weird.   Form improved with each set  Exercises: (No more than 4 columns)   Exercise/Equipment 11/1/2021 #15 11/9/2021 #16 11/22/21 #17           WARM UP      Nu-step       Elliptical 5' 5' 5'   TABLE      SAQ      LAQ      SLR      Single leg bridge 2x10 10x2 2x10   S/L hip abduction                STANDING      STS      TKE with band       Anterior step up   8\" 10x2    Hamstring curl machine  DL 60# 2x10   DL 60# 2x10  R only 30# 10x2 DL 60# 2x10  R LE 30# 2x10   Shuttle leg press  4C BL x10  5C BL x10  2C 2x10 5C 10x2 DL   2C  10x2 R only 3C SL R 2x10   SL squat to table 2x10 RLE from elevated table 24\" 10x2 R LE     Machines downstairs  Leg press  Leg curl  Leg ext      Lateral heel tap 4\" 2x10 No UE support 4\" 10x2 w/  No UE support 4\" 2x10 no UE assist   Lateral step up 6\" x10  8\"x10 8\" 10x2     FT retro walk       FT fwd walk         FT sidesteps   2# x5 R/L   lunges 2x10     Step downs DO NEXT TIME 6\" 10x2 ant step down  Ant step up and over 8\" 2x10   Jogging   3x down and back length of turf   PROPRIOCEPTION SLS      Wobble board  1' AP   1' M/L 1' AP   1' M/L 1' AP/ML                     MODALITIES      [place] E-stim                Other Therapeutic Activities/Education:  HEP and importance of completion    Home Exercise Program: Issued, practiced and pt demo ability to perform 8/17/2021      Manual Treatments:  None      Modalities: None     Communication with other providers:  Progress note sent    Assessment: Pt tolerated treatment today well with no adverse reactions or complications. Pt showed ability to tolerate increased resistance with some of given exercises. Jogging on turf was attempted today and patient showed poor form with decreased stance time on R compared to L. Form was improved as he got comfortable with jogging as he has not performed this in a while. Pt still can improve with Ecc quad control, showing some weakness with step downs. . Pt will continue to benefit from more strengthening.   End pain: 0/10  Plan for Next Session: gait, quad strength, protocol in chart    Time In / Time Out: 0900/0942     Timed Code/Total Treatment Minutes: 42'/ 43' : 22' TE x2; 14' TA x1; 3' NMR x0  Next Progress Note due:  10th visit      Plan of Care Interventions:  [x] Therapeutic Exercise  [x] Modalities:  [x] Therapeutic Activity     [] Ultrasound  [x] Estim  [x] Gait Training      [] Cervical Traction [] Lumbar Traction  [x] Neuromuscular Re-education    [x] Cold/hotpack [] Iontophoresis   [x] Instruction in HEP      [x] Vasopneumatic   [] Dry Needling    [x] Manual Therapy               [] Aquatic Therapy              Electronically signed by:  Randy Ceballos, SPT  11/22/2021,9:00 AM      11/22/2021,9:00 AM

## 2021-12-02 DIAGNOSIS — I10 ESSENTIAL HYPERTENSION: ICD-10-CM

## 2021-12-02 DIAGNOSIS — A49.01 MSSA (METHICILLIN SUSCEPTIBLE STAPHYLOCOCCUS AUREUS) INFECTION: ICD-10-CM

## 2021-12-02 DIAGNOSIS — F41.8 SITUATIONAL ANXIETY: ICD-10-CM

## 2021-12-02 RX ORDER — MINOCYCLINE HYDROCHLORIDE 100 MG/1
CAPSULE ORAL
Qty: 56 CAPSULE | Refills: 0 | Status: SHIPPED | OUTPATIENT
Start: 2021-12-02 | End: 2022-03-25

## 2021-12-02 RX ORDER — LISINOPRIL AND HYDROCHLOROTHIAZIDE 12.5; 1 MG/1; MG/1
1 TABLET ORAL DAILY
Qty: 90 TABLET | Refills: 1 | Status: SHIPPED | OUTPATIENT
Start: 2021-12-02 | End: 2022-06-14 | Stop reason: SDUPTHER

## 2021-12-02 NOTE — TELEPHONE ENCOUNTER
[facility] 10-11-21  NA 4-11-22  [facility]  PATIENT IS OUT OF MEDICATION   PATIENT STATED THAT HE DROPPED HIS BOTTLES OFF AT THE FRONT LAST WEEK

## 2021-12-06 ENCOUNTER — HOSPITAL ENCOUNTER (OUTPATIENT)
Dept: PHYSICAL THERAPY | Age: 36
Setting detail: THERAPIES SERIES
Discharge: HOME | End: 2021-12-06
Payer: COMMERCIAL

## 2021-12-06 PROCEDURE — 97530 THERAPEUTIC ACTIVITIES: CPT

## 2021-12-06 PROCEDURE — 97110 THERAPEUTIC EXERCISES: CPT

## 2021-12-13 ENCOUNTER — HOSPITAL ENCOUNTER (OUTPATIENT)
Dept: PHYSICAL THERAPY | Age: 36
Setting detail: THERAPIES SERIES
Discharge: HOME | End: 2021-12-13
Payer: COMMERCIAL

## 2021-12-13 PROCEDURE — 97530 THERAPEUTIC ACTIVITIES: CPT

## 2021-12-13 PROCEDURE — 97110 THERAPEUTIC EXERCISES: CPT

## 2021-12-13 NOTE — FLOWSHEET NOTE
Outpatient Physical Therapy  [place]           [x] Phone: [phone]   Fax: [phone]  [facility] N[facility]and           [] Phone: [phone]   Fax: [phone]        Physical Therapy Daily Treatment Note  Date:  2021    Patient Name:  Shital Galvan    :  1985  MRN: [id number]  Restrictions/Precautions:   follow protocol  Diagnosis:   Diagnosis: Postop check. R patella tendon repair. R knee I&D  Date of Injury/Surgery: 21 repair. 21 debridment. Treatment Diagnosis: Treatment Diagnosis: RLE weakness, impaired gait    Insurance/Certification information: PT Insurance Information: UMR - 60 PCY   Referring Physician:  Referring Practitioner: DO Mateusz Shaw Doctor Visit:  2021   Plan of care signed (Y/N):  Y  Outcome Measure: LEFS: 56/80  Visit# / total visits:    Pain level: 0/10     Goals:     Patient goals : improve mobility of right leg and get back to work  Short term goals  Time Frame for Short term goals: 5 visits  Short term goal 1: Pt will be IND with HEP in order to maximize recovery outside of clinic MET   Long term goals  Time Frame for Long term goals : 10 visits  Long term goal 1: Pt will improve R knee AROM from 0-115 to aide in stairs MET   Long term goal 2: Pt will improve gross RLE strength to 4+/5 to aide in ADLs Met   Long term goal 3: Pt will improve LEFS to 65 or higher to show [address] and subjective improvement Met   Long term goal 4: Pt will ambulate into clinic with no brace, no deviations and equal stance time Met   Long term goal 5: Pt will descend stairs in a reciprocal pattern without use of handrail with no feelings of  Instability  New goal on        Summary of Evaluation: Assessment: Pt is a 79-year-old male who presents to clinic following a R patellar tendon repair on 21, as well as debridement, incision and drainage of infection on 21.  Upon assessment, pt presents with impaired gait, impaired balance, impaired R knee strength and ROM and overall reduced independent with ADL/ iADL and work-related tasks. Pt would benefit from skilled therapy interventions to address listed impairments, progress toward goal completion and improve ADL/IADL status. PT also warranted to reduce risk for further injury or decline. Subjective:  Pt arrives to therapy today with no complaints of pain. States he did notice a little swelling after his last visit but did not last long. Any changes in Ambulatory Summary Sheet? None      Objective: COVID screening questions were asked and patient attested that there had been no contact or symptoms        Has ACE wrap around knee  DL Hops on shuttle press: good landing mechanics  Improved ecc quad control on step down   Jogging: form improved, still decr stance time on R LE.     Exercises: (No more than 4 columns)   Exercise/Equipment 11/22/21 #17 12/6/21 #18 12/13/21 #19           WARM UP      Nu-step       Elliptical 5' 5' 5'   TABLE      SAQ      LAQ      SLR      Single leg bridge 2x10 x10 ea leg x10 ea leg   S/L hip abduction                STANDING      STS      TKE with band       Anterior step up       Hamstring curl machine  DL 60# 2x10  R LE 30# 2x10 DL 70# 2x10 DL 70# 2x10   Shuttle leg press  3C SL R 2x10 3C SL R 2x10  1C SL R jumps x10 3C SL R 2x10  1C DL jumps x15   SL squat to table      Machines downstairs  Leg press  Leg curl  Leg ext      Lateral heel tap 4\" 2x10 no UE assist 4\" 2x10 heel taps 4\" 2x10 heel taps   Lateral step up      FT retro walk   40# x3 40# x3   FT fwd walk     40# x3 40# x3   FT sidesteps 2# x5 R/L 27# x5 R/L 27# x5 R/L   lunges      Step downs Ant step up and over 8\" 2x10 Ant step up and over 8\" 2x10 Ant step up and over 8\" 2x10   Jogging 3x down and back length of turf 4x down and back length of turf 4x down and back length of turf   PROPRIOCEPTION      SLS      Wobble board  1' AP/ML                       MODALITIES      [place] E-stim Other Therapeutic Activities/Education:  HEP and importance of completion    Home Exercise Program: Issued, practiced and pt demo ability to perform 8/17/2021      Manual Treatments:  None      Modalities: None     Communication with other providers:  Progress note sent    Assessment: Pt demonstrated overall good tolerance to today's session with no adverse reactions. Pt will continue to benefit from physical therapy to address remaining strength impairments and allow pt to return to PLOF and progress toward goal completion. PT also warranted to reduce risk of future injury/decline.   End pain: 0/10  Plan for Next Session: gait, quad strength, protocol in chart    Time In / Time Out: 7074-5745     Timed Code/Total Treatment Minutes:  40/40, (2) TE, (1) TA  Next Progress Note due:  10th visit      Plan of Care Interventions:  [x] Therapeutic Exercise  [x] Modalities:  [x] Therapeutic Activity     [] Ultrasound  [x] Estim  [x] Gait Training      [] Cervical Traction [] Lumbar Traction  [x] Neuromuscular Re-education    [x] Cold/hotpack [] Iontophoresis   [x] Instruction in HEP      [x] Vasopneumatic   [] Dry Needling    [x] Manual Therapy               [] Aquatic Therapy              Electronically signed by:  Mary Gustafson, MAKENNA  12/13/2021,4:01 PM

## 2021-12-20 ENCOUNTER — HOSPITAL ENCOUNTER (OUTPATIENT)
Dept: PHYSICAL THERAPY | Age: 36
Setting detail: THERAPIES SERIES
Discharge: HOME | End: 2021-12-20
Payer: COMMERCIAL

## 2021-12-20 PROCEDURE — 97110 THERAPEUTIC EXERCISES: CPT

## 2021-12-20 NOTE — FLOWSHEET NOTE
Outpatient Physical Therapy  [place]           [x] Phone: [phone]   Fax: [phone]  [facility]isa Donohue           [] Phone: [phone]   Fax: [phone]        Physical Therapy Daily Treatment Note  Date:  2021    Patient Name:  Ophelia Mccollum    :  1985  MRN: [id number]  Restrictions/Precautions:   follow protocol  Diagnosis:   Diagnosis: Postop check. R patella tendon repair. R knee I&D  Date of Injury/Surgery: 21 repair. 21 debridment. Treatment Diagnosis: Treatment Diagnosis: RLE weakness, impaired gait    Insurance/Certification information: PT Insurance Information: UMR - 61 PCY   Referring Physician:  Referring Practitioner: DO Matesuz Harris Doctor Visit:    Plan of care signed (Y/N):  Y  Outcome Measure: LEFS: 80/80  Visit# / total visits:    Pain level: 0/10     Goals:     Patient goals : improve mobility of right leg and get back to work MET   Short term goals  Time Frame for Short term goals: 5 visits  Short term goal 1: Pt will be IND with HEP in order to maximize recovery outside of clinic ReMET   Long term goals  Time Frame for Long term goals : 10 visits  Long term goal 1: Pt will improve R knee AROM from 0-115 to aide in stairs ReMET   Long term goal 2: Pt will improve gross RLE strength to 4+/5 to aide in ADLs ReMET   Long term goal 3: Pt will improve LEFS to 65 or higher to show [address] and subjective improvement ReMET   Long term goal 4: Pt will ambulate into clinic with no brace, no deviations and equal stance time ReMET   Long term goal 5: Pt will descend stairs in a reciprocal pattern without use of handrail with no feelings of  Instability ReMET       Summary of Evaluation: Assessment: Pt is a 70-year-old male who presents to clinic following a R patellar tendon repair on 21, as well as debridement, incision and drainage of infection on 21.  Upon assessment, pt presents with impaired gait, impaired balance, impaired R knee strength and ROM and overall reduced independent with ADL/ IADL and work-related tasks. Pt would benefit from skilled therapy interventions to address listed impairments, progress toward goal completion and improve ADL/IADL status. PT also warranted to reduce risk for further injury or decline. Subjective:  Pt is doing very well this date. He is no longer having any issues or limitations outside of the clinic, rarely at this point does he have any feeling of instability at the R knee/quad. He is working and doing some exercise as well with no problems. LEFS: 80/80      Any changes in Ambulatory Summary Sheet?   None      Objective: COVID screening questions were asked and patient attested that there had been no contact or symptoms    R knee AROM: 0-127 deg  Gross RLE MMT: 5/5  Stairs: reciprocal pattern no HR ascend and descend with no instability or pain    Exercises: (No more than 4 columns)   Exercise/Equipment 12/6/21 #18 12/13/21 #19 12/20/21 #20           WARM UP      Nu-step       Elliptical 5' 5' 5'   TABLE      SAQ      LAQ      SLR      Single leg bridge x10 ea leg x10 ea leg    S/L hip abduction                STANDING      STS      TKE with band       Anterior step up       Hamstring curl machine  DL 70# 2x10 DL 70# 2x10    Shuttle leg press  3C SL R 2x10  1C SL R jumps x10 3C SL R 2x10  1C DL jumps x15    SL squat to table      Machines downstairs  Leg press  Leg curl  Leg ext      Lateral heel tap 4\" 2x10 heel taps 4\" 2x10 heel taps 4\" x10 heel taps   Lateral step up      FT retro walk  40# x3 40# x3 40# x3   FT fwd walk    40# x3 40# x3 40# x3   FT sidesteps 27# x5 R/L 27# x5 R/L 40# x3   lunges      Step downs Ant step up and over 8\" 2x10 Ant step up and over 8\" 2x10 x15 step up 8\"   Jogging 4x down and back length of turf 4x down and back length of turf x3 down and back length of turf   PROPRIOCEPTION      SLS      Wobble board                         MODALITIES      [place] E-stim                Other Therapeutic Activities/Education:  HEP and importance of completion after dc    Home Exercise Program: Issued, practiced and pt demo ability to perform 8/17/2021      Manual Treatments:  None      Modalities: None     Communication with other providers:  DC note sent    Assessment: Pt demonstrated overall good tolerance to today's session with no adverse reactions. Pt has shown great progress since therapy start. He is no longer having any functional limitations at home or at work. He has met all goals at this time. PT educated pt on continuation of HEP after discharge for max benefits and reduced risk for future decline. Pt dc this date.   End pain: 0/10    Plan for Next Session: gait, quad strength, protocol in chart    Time In / Time Out: 1600 - 1631     Timed Code/Total Treatment Minutes:  31': 32' TE x2    Next Progress Note due:  Every 10th visit      Plan of Care Interventions:  [x] Therapeutic Exercise  [x] Modalities:  [x] Therapeutic Activity     [] Ultrasound  [x] Estim  [x] Gait Training      [] Cervical Traction [] Lumbar Traction  [x] Neuromuscular Re-education    [x] Cold/hotpack [] Iontophoresis   [x] Instruction in HEP      [x] Vasopneumatic   [] Dry Needling    [x] Manual Therapy               [] Aquatic Therapy              Electronically signed by:  Lauren Baldwin, PT, DPT  12/20/2021,11:19 AM

## 2021-12-20 NOTE — DISCHARGE SUMMARY
Outpatient Physical Therapy           [place]           [x] Phone: [phone]   Fax: [phone]  [place]           [] Phone: [phone]   Fax: [phone]      To:   Dave Baltazar DO    From: Jose Luis Morataya, PT, DPT     Patient: Lashay Mccoy                  : 1985  Diagnosis:    Postop check. R patella tendon repair. R knee I&D  Date: 2021  Treatment Diagnosis:      RLE weakness, impaired gait      []  Progress Note                [x]  Discharge Note     Evaluation Date:  21  Total Visits to date: 21 Cancels/No-shows to date:  0    Subjective: Pt is doing very well this date. He is no longer having any issues or limitations outside of the clinic, rarely at this point does he have any feeling of instability at the R knee/quad. He is working and doing some exercise as well with no problems. LEFS: 80/80      Plan of Care/Treatment to date:  [x] Therapeutic Exercise    [x] Modalities:  [x] Therapeutic Activity     [x] Ultrasound  [x] Electrical Stimulation  [x] Gait Training      [] Cervical Traction   [] Lumbar Traction  [x] Neuromuscular Re-education  [x] Cold/hotpack [] Iontophoresis  [x] Instruction in HEP      Other:  [x] Manual Therapy       [x]  Vasopneumatic  [] Aquatic Therapy       []   Dry Needle Therapy                      Objective/Significant Findings At Last Visit/Comments:    R knee AROM: 0-127 deg  Gross RLE MMT: 5/5  Stairs: reciprocal pattern no HR ascend and descend with no instability or pain    Assessment:   Pt has shown great progress since therapy start. He is no longer having any functional limitations at home or at work. He has met all goals at this time. PT educated pt on continuation of HEP after discharge for max benefits and reduced risk for future decline. Pt dc this date.       Goal Status:  [x] Achieved [] Partially Achieved  [x] New goal     Changes to goals:      Patient goals : improve mobility of right leg and get back to work MET 12/20  Short term goals  Time Frame for Short term goals: 5 visits  Short term goal 1: Pt will be IND with HEP in order to maximize recovery outside of clinic ReMET 12/20  Long term goals  Time Frame for Long term goals : 10 visits  Long term goal 1: Pt will improve R knee AROM from 0-115 to aide in stairs ReMET 12/20  Long term goal 2: Pt will improve gross RLE strength to 4+/5 to aide in ADLs ReMET 12/20  Long term goal 3: Pt will improve LEFS to 65 or higher to show [address] and subjective improvement ReMET 12/20  Long term goal 4: Pt will ambulate into clinic with no brace, no deviations and equal stance time ReMET 12/20  Long term goal 5: Pt will descend stairs in a reciprocal pattern without use of handrail with no feelings of  Instability ReMET 12/20    Patient Status: [] Continue per initial plan of Care     [x] Patient now discharged     [] Additional visits requested, Please re-certify for additional visits:      Requested frequency/duration:  2 /week for 3 weeks    If we are requesting more visits, we fully anticipate the patient's condition is expected to improve within the treatment timeframe we are requesting. Electronically signed by:  Penelope Sigala, PT, DPT, 12/20/2021, 11:20 AM    If you have any questions or concerns, please don't hesitate to call.   Thank you for your referral.    Physician Signature:______________________ Date:______ Time: ________  By signing above, therapists plan is approved by physician

## 2022-02-17 ENCOUNTER — OFFICE VISIT (OUTPATIENT)
Dept: INFECTIOUS DISEASES | Age: 37
End: 2022-02-17
Payer: COMMERCIAL

## 2022-02-17 VITALS
BODY MASS INDEX: 40.67 KG/M2 | SYSTOLIC BLOOD PRESSURE: 135 MMHG | HEART RATE: 74 BPM | DIASTOLIC BLOOD PRESSURE: 75 MMHG | RESPIRATION RATE: 18 BRPM | TEMPERATURE: 97.1 F | WEIGHT: 275.4 LBS

## 2022-02-17 DIAGNOSIS — M00.9 INFECTION OF RIGHT KNEE (HCC): Primary | ICD-10-CM

## 2022-02-17 DIAGNOSIS — A49.01 MSSA (METHICILLIN SUSCEPTIBLE STAPHYLOCOCCUS AUREUS) INFECTION: ICD-10-CM

## 2022-02-17 PROCEDURE — 99213 OFFICE O/P EST LOW 20 MIN: CPT | Performed by: INTERNAL MEDICINE

## 2022-02-17 RX ORDER — DOXYCYCLINE HYCLATE 100 MG
100 TABLET ORAL 2 TIMES DAILY
Qty: 60 TABLET | Refills: 2 | Status: SHIPPED | OUTPATIENT
Start: 2022-02-17 | End: 2022-03-25

## 2022-02-17 NOTE — PROGRESS NOTES
2/17/2022         Referring Physician: No ref. provider found  Primary Care Physician: Laila Smith MD    Impression/Plan:   Diagnosis Orders   1. Infection of right knee ([address]     2. MSSA (methicillin susceptible Staphylococcus aureus) infection  doxycycline hyclate (VIBRA-TABS) 100 MG tablet       Discussion:  Infection of right knee ([address]  Will do a trial off doxycycline. There is a risk of reactivation of infection, and there may also be a chance that the infection has been cured. Doxycycline was prescribed in the event that he begins to feel pain in his knee. Asked to see us as needed. Return if symptoms worsen or fail to improve. History: Niesha Whitney is a 39 y.o.  male presenting today for follow-up. Medical history of morbid obesity, hypertension, depression. Was seen in the hospitalWith right knee pain and edema. He had a right patella tendon rupture on 6/22/2021 while he was playing baseball. He underwent a repair of the right patella tendon using them as planned 5 x 5 cm graft on 6/22/2021. His symptom started on 6/29/2021. He underwent incision and drainage of right knee with excision of dermal spine graft. Culture was positive for MSSA. He was discharged to complete a 4-week course of cefazolin on 8/2/2021. Denies nausea, vomiting, or diarrhea  9/1/2021: He completed a 4-week course of antibiotics on 8-21. Had persistent right knee abscess and underwent I&D on 8/25/2021. Culture was positive for Staph aureus; patient was put placed on Keflex 500 mg orally 4 times daily for 10 days to end on 9/4/2021. He reports that he was told that he had a retained stitch. Denies dysphagia, n/v/d/f  9/15/2021: Stitches were removed on 9/13/2021. Denies pain, fever or chills. 10/27/2021 seen in the hospital on 10/14/2021 for right knee prepatellar cellulitis and stitch abscess. Underwent right knee incision and drainage on 10/14/2021.   He was discharged from the hospital with linezolid and ciprofloxacin for 14 days, (culture results were not available at that time). Cultures have once more shown MSSA. Susceptible to moxifloxacin. improving. right knee is improving. Reports that sutures will be removed on 11/1/2021 11/18/2021: at last visit, minocycline was prescribed for 4 weeks. He had concerns about leakage from his right knee surgical site and visited the ED on 11/9/2021. Bacitracin ointment was prescribed. 2/17/2022: At last visit he was prescribed doxycycline for 3 months. Scheduled to see Dr. Patricia Ferguson on 2/21/2022. Review of Systems   All other systems reviewed and are negative. No Known Allergies    Patient Active Problem List   Diagnosis    Essential hypertension    Situational anxiety    History of attention deficit disorder    Rupture of right patellar tendon    Cellulitis of right knee    Postoperative infection    MSSA (methicillin susceptible Staphylococcus aureus) infection    Receiving intravenous antibiotic treatment as outpatient    Morbid obesity with BMI of 40.0-44.9, adult ([facility] [address]    Infection of right knee (HCC)    Chronic infection of right knee (HCC)       Current Outpatient Medications   Medication Sig Dispense Refill    doxycycline hyclate (VIBRA-TABS) 100 MG tablet Take 1 tablet by mouth 2 times daily Start medication if you experience pain, or redness in your right knee joint 60 tablet 2    minocycline (MINOCIN;DYNACIN) 100 MG capsule take 1 capsule by mouth twice a day 56 capsule 0    sertraline (ZOLOFT) 50 MG tablet Take 1 tablet by mouth daily 90 tablet 1    lisinopril-hydroCHLOROthiazide (PRINZIDE;ZESTORETIC) 10-12.5 MG per tablet Take 1 tablet by mouth daily 90 tablet 1     No current facility-administered medications for this visit.        Past Medical History:   Diagnosis Date    Hypertension        Past Surgical History:   Procedure Laterality Date    INGUINAL HERNIA REPAIR Left 1986    KNEE SURGERY Right 8/25/2021 RIGHT KNEE INCISION AND DRAINAGE performed by Estelita Calle DO at [facility] Right 10/14/2021    RIGHT KNEE INCISION AND DRAINAGE performed by Estelita Calle DO at [facility] Right 7/6/2021    RIGHT KNEE DEBRIDEMENT INCISION AND DRAINAGE performed by Estelita Calle DO at [address] Right 6/22/2021    RIGHT PATELLA TENDON REPAIR performed by Estelita Calle DO at [facility] S History     Socioeconomic History    Marital status:      Spouse name: Not on file    Number of children: Not on file    Years of education: Not on file    Highest education level: Not on file   Occupational History    Not on file   Tobacco Use    Smoking status: Never Smoker    Smokeless tobacco: Former User     Types: Chew   Vaping Use    Vaping Use: Never used   Substance and Sexual Activity    Alcohol use: Yes     Comment: Occasionally    Drug use: Never    Sexual activity: Yes     Partners: Female   Other Topics Concern    Not on file   Social History Narrative    Not on file     Social Determinants of Health     Financial Resource Strain: Low Risk     Difficulty of Paying Living Expenses: Not hard at all   Food Insecurity: No Food Insecurity    Worried About [facility] in the Last Year: Never true    Noah of Food in the Last Year: Never true   Transportation Needs: No Transportation Needs    Lack of Transportation (Medical): No    Lack of Transportation (Non-Medical):  No   Physical Activity:     Days of Exercise per Week: Not on file    Minutes of Exercise per Session: Not on file   Stress:     Feeling of Stress : Not on file   Social Connections:     Frequency of Communication with Friends and Family: Not on file    Frequency of Social Gatherings with Friends and Family: Not on file    Attends Orthodoxy Services: Not on file    Active Member of Clubs or Organizations: Not on file    Attends Club or Organization Meetings: Not on file    Marital Status: Not on file   Intimate Partner Violence:     Fear of Current or Ex-Partner: Not on file    Emotionally Abused: Not on file    Physically Abused: Not on file    Sexually Abused: Not on file   Housing Stability:     Unable to Pay for Housing in the Last Year: Not on file    Number of [place] in the Last Year: Not on file    Unstable Housing in the Last Year: Not on file       Family History   Problem Relation Age of Onset    Other Mother         Adriano's    Cancer Father         Testicular       Vital Signs:  Vitals:    02/17/22 1001   BP: 135/75   Site: Left Upper Arm   Position: Sitting   Cuff Size: Medium Adult   Pulse: 74   Resp: 18   Temp: 97.1 °F (36.2 °C)   TempSrc: Infrared   Weight: 275 lb 6.4 oz (124.9 kg)        Wt Readings from Last 3 Encounters:   02/17/22 275 lb 6.4 oz (124.9 kg)   11/22/21 279 lb (126.6 kg)   11/18/21 279 lb 12.8 oz (126.9 kg)        Physical Exam:   Gen: alert and NAD  HEENT: sclera clear, pupils equal and reactive, extra ocular muscles intact, oropharynx clear, mucus membranes moist, tympanic membranes clear bilaterally, no cervical lymphadenopathy noted and neck supple  Neck: supple, no significant adenopathy  Chest: clear to auscultation, no wheezes, rales or rhonchi, symmetric air entry  Heart: regular rate and rhythm, no murmurs  ABD: abdomen is soft without significant tenderness, masses, organomegaly or guarding. EXT:peripheral pulses normal, no pedal edema, no clubbing or cyanosis. Left arm PICC line.    NEURO: alert, oriented, normal speech, no focal findings or movement disorder noted  Skin: well hydrated, no lesions, surgical site examined  Wounds: right knee surgical scar, not tender   Labs:   WBC   Date Value Ref Range Status   10/15/2021 8.3 4.0 - 10.5 K/CU MM Final   10/13/2021 7.0 4.0 - 10.5 K/CU MM Final   10/12/2021 7.9 4.0 - 10.5 K/CU MM Final     CREATININE   Date Value Ref Range Status   10/15/2021 0.6 (L) 0.9 - 1.3 MG/DL Final   10/15/2021 0.8 (L) 0.9 - 1.3 MG/DL Final   10/13/2021 0.8 (L) 0.9 - 1.3 MG/DL Final       Cultures:  Culture   Date Value Ref Range Status   10/14/2021 Final Report No anaerobes isolated  Final   10/14/2021 (A)  Final    STAPHYLOCOCCUS AUREUS Rare growth No further workup Refer to (culture [id number] and [id number] 10/14/21) for sensitivity results   10/14/2021 Final Report No anaerobes isolated  Final   10/14/2021 STAPHYLOCOCCUS AUREUS Rare growth (A)  Final       Imaging Studies: This dictation was created with voice recognition software. While attempts have been made to review the dictation as it is transcribed, on occasion the spoken word can be misinterpreted by the technology leading to omissions or inappropriate words, phrases or sentences.     Electronically signed by: Electronically signed by Brandon Vasquez MD on 2/17/2022 at 8:54 PM, 2/17/2022 8:54 PM

## 2022-02-17 NOTE — ASSESSMENT & PLAN NOTE
Will do a trial off doxycycline. There is a risk of reactivation of infection, and there may also be a chance that the infection has been cured. Doxycycline was prescribed in the event that he begins to feel pain in his knee. Asked to see us as needed.

## 2022-02-21 ENCOUNTER — OFFICE VISIT (OUTPATIENT)
Dept: ORTHOPEDIC SURGERY | Age: 37
End: 2022-02-21
Payer: COMMERCIAL

## 2022-02-21 VITALS
HEIGHT: 69 IN | OXYGEN SATURATION: 98 % | HEART RATE: 76 BPM | WEIGHT: 275 LBS | BODY MASS INDEX: 40.73 KG/M2 | RESPIRATION RATE: 15 BRPM

## 2022-02-21 DIAGNOSIS — Z09 S/P ORTHOPEDIC SURGERY, FOLLOW-UP EXAM: Primary | ICD-10-CM

## 2022-02-21 PROCEDURE — 99212 OFFICE O/P EST SF 10 MIN: CPT | Performed by: ORTHOPAEDIC SURGERY

## 2022-02-21 RX ORDER — FLUOCINONIDE TOPICAL SOLUTION USP, 0.05% 0.5 MG/ML
SOLUTION TOPICAL
COMMUNITY
Start: 2022-01-26

## 2022-02-21 NOTE — PROGRESS NOTES
Patient presents to the office today for Fu of the right knee I&D DOS 11/14/21. Pt is currently on Doxycycline hyclate antibiotics. Pt denies any pain at this time. Pt states overall he is doing great!

## 2022-02-22 ASSESSMENT — ENCOUNTER SYMPTOMS
CHEST TIGHTNESS: 0
BACK PAIN: 0
COLOR CHANGE: 0

## 2022-02-22 NOTE — PROGRESS NOTES
Subjective:      Patient ID: Lawrence Harp is a 39 y.o. male. Patient presents to the office today for Fu of the right knee I&D DOS 11/14/21. Pt is currently on Doxycycline hyclate antibiotics. Pt denies any pain at this time. Pt states overall he is doing great! He comes in today for a recheck of his right knee, he is now about 3-1/2 months out from his most recent I&D of the right knee. He states that overall he is not having any pain or issues with his right knee and his swelling is continuing to improve. He does continue his oral antibiotics per infectious disease. He is expecting to come off of his antibiotics next month. Patient denies any new injury to the involved extremity/ joint, denies numbness or tingling in the involved extremity and denies fever or chills. Wound Check    Knee Pain   Pertinent negatives include no numbness. Review of Systems   Constitutional: Negative for activity change, chills and fever. Respiratory: Negative for chest tightness. Cardiovascular: Negative for chest pain. Musculoskeletal: Negative for arthralgias, back pain, gait problem, joint swelling and myalgias. Skin: Negative for color change, pallor, rash and wound. Neurological: Negative for weakness and numbness. Past Medical History:   Diagnosis Date    Hypertension        Objective:   Physical Exam  Constitutional:       Appearance: He is well-developed. HENT:      Head: Normocephalic. Eyes:      Pupils: Pupils are equal, round, and reactive to light. Pulmonary:      Effort: Pulmonary effort is normal.   Musculoskeletal:         General: No swelling, tenderness, deformity or signs of injury. Normal range of motion. Cervical back: Normal range of motion. Right hip: Normal.      Left hip: Normal.      Right knee: No swelling, deformity, effusion, erythema, ecchymosis, lacerations or bony tenderness. Normal range of motion. No tenderness.  No medial joint line, lateral joint line, MCL or LCL tenderness. No LCL laxity or MCL laxity. Normal alignment and normal patellar mobility. Left knee: Normal. No swelling, deformity, effusion, erythema, ecchymosis, lacerations or bony tenderness. Normal range of motion. No tenderness. No medial joint line, lateral joint line, MCL, LCL or patellar tendon tenderness. No LCL laxity or MCL laxity. Normal alignment and normal patellar mobility. Skin:     General: Skin is warm and dry. Capillary Refill: Capillary refill takes less than 2 seconds. Coloration: Skin is not pale. Findings: No erythema or rash. Neurological:      Mental Status: He is alert and oriented to person, place, and time. Right knee-Incision clean, dry, intact, with no erythema, no drainage, and no signs of infection. 0-140      Assessment:       Right knee I&D, 3-1/2 months  Right knee patellar tendon repair, 8 months      Plan:       I discussed with him today that he is continuing to progress extremely well. I explained to him that if his infection does recur the next step would be to consider a radical debridement with arthrotomy, removal of the final suture and possible wound closure or even possibly a wound VAC if needed. At this point given his improvement I recommend that we continue with chronic suppressive antibiotics. Continue weight-bearing as tolerated. Continue range of motion exercises as instructed. Ice and elevate as needed. Tylenol or Motrin for pain.   Follow up will be as needed            Celia Ballesteros, DO

## 2022-03-25 ENCOUNTER — OFFICE VISIT (OUTPATIENT)
Dept: FAMILY MEDICINE CLINIC | Age: 37
End: 2022-03-25
Payer: COMMERCIAL

## 2022-03-25 VITALS
OXYGEN SATURATION: 96 % | WEIGHT: 279.8 LBS | BODY MASS INDEX: 41.44 KG/M2 | HEART RATE: 61 BPM | SYSTOLIC BLOOD PRESSURE: 124 MMHG | DIASTOLIC BLOOD PRESSURE: 86 MMHG | HEIGHT: 69 IN

## 2022-03-25 DIAGNOSIS — M00.9 CHRONIC INFECTION OF RIGHT KNEE (HCC): ICD-10-CM

## 2022-03-25 DIAGNOSIS — I10 ESSENTIAL HYPERTENSION: ICD-10-CM

## 2022-03-25 DIAGNOSIS — Z76.89 ENCOUNTER TO ESTABLISH CARE WITH NEW DOCTOR: Primary | ICD-10-CM

## 2022-03-25 DIAGNOSIS — F41.9 ANXIETY: ICD-10-CM

## 2022-03-25 DIAGNOSIS — E66.01 MORBID OBESITY WITH BMI OF 40.0-44.9, ADULT (HCC): ICD-10-CM

## 2022-03-25 LAB
A/G RATIO: 1.9 (ref 1.1–2.2)
ALBUMIN SERPL-MCNC: 4.8 G/DL (ref 3.4–5)
ALP BLD-CCNC: 62 U/L (ref 40–129)
ALT SERPL-CCNC: 32 U/L (ref 10–40)
ANION GAP SERPL CALCULATED.3IONS-SCNC: 14 MMOL/L (ref 3–16)
AST SERPL-CCNC: 20 U/L (ref 15–37)
BASOPHILS ABSOLUTE: 0 K/UL (ref 0–0.2)
BASOPHILS RELATIVE PERCENT: 0.4 %
BILIRUB SERPL-MCNC: <0.2 MG/DL (ref 0–1)
BUN BLDV-MCNC: 17 MG/DL (ref 7–20)
CALCIUM SERPL-MCNC: 9.4 MG/DL (ref 8.3–10.6)
CHLORIDE BLD-SCNC: 103 MMOL/L (ref 99–110)
CHOLESTEROL, FASTING: 216 MG/DL (ref 0–199)
CO2: 22 MMOL/L (ref 21–32)
CREAT SERPL-MCNC: 0.8 MG/DL (ref 0.9–1.3)
EOSINOPHILS ABSOLUTE: 0.1 K/UL (ref 0–0.6)
EOSINOPHILS RELATIVE PERCENT: 1.7 %
GFR AFRICAN AMERICAN: >60
GFR NON-AFRICAN AMERICAN: >60
GLUCOSE BLD-MCNC: 92 MG/DL (ref 70–99)
HCT VFR BLD CALC: 45.2 % (ref 40.5–52.5)
HDLC SERPL-MCNC: 44 MG/DL (ref 40–60)
HEMOGLOBIN: 14.7 G/DL (ref 13.5–17.5)
LDL CHOLESTEROL CALCULATED: 158 MG/DL
LYMPHOCYTES ABSOLUTE: 1.8 K/UL (ref 1–5.1)
LYMPHOCYTES RELATIVE PERCENT: 34.6 %
MCH RBC QN AUTO: 29.1 PG (ref 26–34)
MCHC RBC AUTO-ENTMCNC: 32.6 G/DL (ref 31–36)
MCV RBC AUTO: 89.2 FL (ref 80–100)
MONOCYTES ABSOLUTE: 0.5 K/UL (ref 0–1.3)
MONOCYTES RELATIVE PERCENT: 9.9 %
NEUTROPHILS ABSOLUTE: 2.8 K/UL (ref 1.7–7.7)
NEUTROPHILS RELATIVE PERCENT: 53.4 %
PDW BLD-RTO: 13.6 % (ref 12.4–15.4)
PLATELET # BLD: 243 K/UL (ref 135–450)
PMV BLD AUTO: 7.4 FL (ref 5–10.5)
POTASSIUM SERPL-SCNC: 4.4 MMOL/L (ref 3.5–5.1)
RBC # BLD: 5.06 M/UL (ref 4.2–5.9)
SODIUM BLD-SCNC: 139 MMOL/L (ref 136–145)
TOTAL PROTEIN: 7.3 G/DL (ref 6.4–8.2)
TRIGLYCERIDE, FASTING: 72 MG/DL (ref 0–150)
TSH REFLEX: 2.86 UIU/ML (ref 0.27–4.2)
VLDLC SERPL CALC-MCNC: 14 MG/DL
WBC # BLD: 5.2 K/UL (ref 4–11)

## 2022-03-25 PROCEDURE — 99214 OFFICE O/P EST MOD 30 MIN: CPT | Performed by: PHYSICIAN ASSISTANT

## 2022-03-25 NOTE — PROGRESS NOTES
3/25/2022    Liane Harding    Chief Complaint   Patient presents with   Manish Velasquez New Doctor     new to provider       HPI  History was obtained from pt. Galdino Lyons is a 39 y.o. male with a PMHx of HTN, anxiety, obesity, ADD, patellar tendon rupture and subsequent septic arthritis who presents today to establish care with me. He is a former pt of Dr. Elizabeth Jerez. He does not need any medications refilled today. HTN - pt well controlled on lisinopril/hctz    Anxiety - pt has been on sertraline for 5 years, is doing well on it. Does also have a history of ADD but it hasnt been an issue since high school. Knee - suffered a patellar tendon rupture and had recurrent infections for 1.5 years after that. Just finished a long course of abx and is currently doing well with the knee. No pain, redness, swelling or fevers. Will need to monitor self for symptoms recurrance     Hernia repair as a toddler. fam hx - no heart disease, diabetes or thyroid issues  Father had leukemia, prostate    Care gaps:  Pt is UTD on tetanus  Doesn't want screened for HIV or Hep C   Had 1 moderna shot but had severe side effects that made him not want to complete the series    1. Encounter to establish care with new doctor    2. Essential hypertension    3. Morbid obesity with BMI of 40.0-44.9, adult ([address]    4. Anxiety    5.  Chronic infection of right knee (HCC)           REVIEW OF SYMPTOMS    Review of Systems    PAST MEDICAL HISTORY  Past Medical History:   Diagnosis Date    Hypertension        FAMILY HISTORY  Family History   Problem Relation Age of Onset    Other Mother         Adriano's    Cancer Father         Testicular       SOCIAL HISTORY  Social History     Socioeconomic History    Marital status:      Spouse name: Not on file    Number of children: Not on file    Years of education: Not on file    Highest education level: Not on file   Occupational History    Not on file   Tobacco Use    Smoking status: Never Smoker    Smokeless tobacco: Former User     Types: Chew   Vaping Use    Vaping Use: Never used   Substance and Sexual Activity    Alcohol use: Yes     Comment: Occasionally    Drug use: Never    Sexual activity: Yes     Partners: Female   Other Topics Concern    Not on file   Social History Narrative    Not on file     Social Determinants of Health     Financial Resource Strain: Low Risk     Difficulty of Paying Living Expenses: Not hard at all   Food Insecurity: No Food Insecurity    Worried About [address] [place] [address] in the Last Year: Never true    Noah of Food in the Last Year: Never true   Transportation Needs: No Transportation Needs    Lack of Transportation (Medical): No    Lack of Transportation (Non-Medical):  No   Physical Activity:     Days of Exercise per Week: Not on file    Minutes of Exercise per Session: Not on file   Stress:     Feeling of Stress : Not on file   Social Connections:     Frequency of Communication with Friends and Family: Not on file    Frequency of Social Gatherings with Friends and Family: Not on file    Attends Congregation Services: Not on file    Active Member of [address] [place] [address] or Organizations: Not on file    Attends Club or Organization Meetings: Not on file    Marital Status: Not on file   Intimate Partner Violence:     Fear of Current or Ex-Partner: Not on file    Emotionally Abused: Not on file    Physically Abused: Not on file    Sexually Abused: Not on file   Housing Stability:     Unable to Pay for Housing in the Last Year: Not on file    Number of [place] in the Last Year: Not on file    Unstable Housing in the Last Year: Not on file        SURGICAL HISTORY  Past Surgical History:   Procedure Laterality Date    [facility] Right 8/25/2021    RIGHT KNEE INCISION AND DRAINAGE performed by Soledad Hardy DO at [facility] Right 10/14/2021    RIGHT KNEE INCISION AND DRAINAGE performed by General: Bowel sounds are normal. There is no distension. Palpations: Abdomen is soft. There is no mass. Tenderness: There is no abdominal tenderness. There is no right CVA tenderness, left CVA tenderness, guarding or rebound. Musculoskeletal:         General: No deformity. Normal range of motion. Cervical back: Normal range of motion and neck supple. No rigidity. No muscular tenderness. Right lower leg: No edema. Left lower leg: No edema. Skin:     General: Skin is warm and dry. Capillary Refill: Capillary refill takes less than 2 seconds. Findings: No bruising, erythema or rash. Neurological:      General: No focal deficit present. Mental Status: He is alert and oriented to person, place, and time. Coordination: Coordination normal.      Gait: Gait normal.   Psychiatric:         Mood and Affect: Mood normal.         Behavior: Behavior normal.         ASSESSMENT & PLAN    1. Encounter to establish care with new doctor  Care gaps:  Pt is UTD on tetanus  Doesn't want screened for HIV or Hep C   Had 1 moderna shot but had severe side effects that made him not want to complete the series    2. Essential hypertension  Well controlled, needs monitoring labs  Continue regimen  - TSH with Reflex; Future  - CBC with Auto Differential; Future  - Comprehensive Metabolic Panel; Future  - Lipid, Fasting; Future    3. Morbid obesity with BMI of 40.0-44.9, adult ([facility] [address]  stable    4. Anxiety  Well controlled, continue regimen    5. Chronic infection of right knee (HCC)  Currently asymptomatic, recently concluded a long course of antibiotics      Return in about 6 months (around 9/25/2022).          Electronically signed by Cindy Bloom, [id number] on 3/25/2022      Comment: Please note this report has been produced using speech recognition software and may contain errors related to that system including errors in grammar, punctuation, and spelling, as well as words and phrases that may be inappropriate. If there are any questions or concerns please feel free to contact the dictating provider for clarification. [Negative] : Endocrine

## 2022-06-14 DIAGNOSIS — I10 ESSENTIAL HYPERTENSION: ICD-10-CM

## 2022-06-14 DIAGNOSIS — F41.8 SITUATIONAL ANXIETY: ICD-10-CM

## 2022-06-15 RX ORDER — LISINOPRIL AND HYDROCHLOROTHIAZIDE 12.5; 1 MG/1; MG/1
1 TABLET ORAL DAILY
Qty: 90 TABLET | Refills: 1 | Status: SHIPPED | OUTPATIENT
Start: 2022-06-15 | End: 2022-09-23

## 2022-09-23 ENCOUNTER — OFFICE VISIT (OUTPATIENT)
Dept: FAMILY MEDICINE CLINIC | Age: 37
End: 2022-09-23
Payer: COMMERCIAL

## 2022-09-23 VITALS
HEART RATE: 60 BPM | BODY MASS INDEX: 41.03 KG/M2 | SYSTOLIC BLOOD PRESSURE: 128 MMHG | HEIGHT: 69 IN | WEIGHT: 277 LBS | OXYGEN SATURATION: 97 % | DIASTOLIC BLOOD PRESSURE: 84 MMHG

## 2022-09-23 DIAGNOSIS — Z00.00 ENCOUNTER FOR WELLNESS EXAMINATION: Primary | ICD-10-CM

## 2022-09-23 DIAGNOSIS — Z00.00 ENCOUNTER FOR WELLNESS EXAMINATION: ICD-10-CM

## 2022-09-23 DIAGNOSIS — E78.00 HYPERCHOLESTEROLEMIA: ICD-10-CM

## 2022-09-23 DIAGNOSIS — I10 ESSENTIAL HYPERTENSION: ICD-10-CM

## 2022-09-23 LAB
A/G RATIO: 2 (ref 1.1–2.2)
ALBUMIN SERPL-MCNC: 4.7 G/DL (ref 3.4–5)
ALP BLD-CCNC: 60 U/L (ref 40–129)
ALT SERPL-CCNC: 28 U/L (ref 10–40)
ANION GAP SERPL CALCULATED.3IONS-SCNC: 15 MMOL/L (ref 3–16)
AST SERPL-CCNC: 22 U/L (ref 15–37)
BASOPHILS ABSOLUTE: 0 K/UL (ref 0–0.2)
BASOPHILS RELATIVE PERCENT: 0.5 %
BILIRUB SERPL-MCNC: <0.2 MG/DL (ref 0–1)
BUN BLDV-MCNC: 16 MG/DL (ref 7–20)
CALCIUM SERPL-MCNC: 9.4 MG/DL (ref 8.3–10.6)
CHLORIDE BLD-SCNC: 102 MMOL/L (ref 99–110)
CHOLESTEROL, FASTING: 196 MG/DL (ref 0–199)
CO2: 25 MMOL/L (ref 21–32)
CREAT SERPL-MCNC: 0.9 MG/DL (ref 0.9–1.3)
EOSINOPHILS ABSOLUTE: 0.1 K/UL (ref 0–0.6)
EOSINOPHILS RELATIVE PERCENT: 1.6 %
GFR AFRICAN AMERICAN: >60
GFR NON-AFRICAN AMERICAN: >60
GLUCOSE BLD-MCNC: 88 MG/DL (ref 70–99)
HCT VFR BLD CALC: 43.1 % (ref 40.5–52.5)
HDLC SERPL-MCNC: 42 MG/DL (ref 40–60)
HEMOGLOBIN: 14.5 G/DL (ref 13.5–17.5)
LDL CHOLESTEROL CALCULATED: 140 MG/DL
LYMPHOCYTES ABSOLUTE: 1.9 K/UL (ref 1–5.1)
LYMPHOCYTES RELATIVE PERCENT: 32.9 %
MCH RBC QN AUTO: 30.5 PG (ref 26–34)
MCHC RBC AUTO-ENTMCNC: 33.6 G/DL (ref 31–36)
MCV RBC AUTO: 90.8 FL (ref 80–100)
MONOCYTES ABSOLUTE: 0.5 K/UL (ref 0–1.3)
MONOCYTES RELATIVE PERCENT: 9.2 %
NEUTROPHILS ABSOLUTE: 3.2 K/UL (ref 1.7–7.7)
NEUTROPHILS RELATIVE PERCENT: 55.8 %
PDW BLD-RTO: 13.4 % (ref 12.4–15.4)
PLATELET # BLD: 240 K/UL (ref 135–450)
PMV BLD AUTO: 7.8 FL (ref 5–10.5)
POTASSIUM SERPL-SCNC: 4.3 MMOL/L (ref 3.5–5.1)
RBC # BLD: 4.75 M/UL (ref 4.2–5.9)
SODIUM BLD-SCNC: 142 MMOL/L (ref 136–145)
TOTAL PROTEIN: 7.1 G/DL (ref 6.4–8.2)
TRIGLYCERIDE, FASTING: 69 MG/DL (ref 0–150)
VLDLC SERPL CALC-MCNC: 14 MG/DL
WBC # BLD: 5.7 K/UL (ref 4–11)

## 2022-09-23 PROCEDURE — 99395 PREV VISIT EST AGE 18-39: CPT | Performed by: PHYSICIAN ASSISTANT

## 2022-09-23 NOTE — PROGRESS NOTES
tablet 1    lisinopril-hydroCHLOROthiazide (PRINZIDE;ZESTORETIC) 10-12.5 MG per tablet Take 1 tablet by mouth daily 90 tablet 1    fluocinonide (LIDEX) 0.05 % external solution apply to affected area twice a day if needed       No current facility-administered medications for this visit. ALLERGIES  No Known Allergies    PHYSICAL EXAM    /84 (Site: Right Upper Arm, Position: Sitting, Cuff Size: Medium Adult)   Pulse 60   Ht 5' 9\" (1.753 m)   Wt 277 lb (125.6 kg)   SpO2 97%   BMI 40.91 kg/m²     Physical Exam  Constitutional:       Appearance: Normal appearance. He is normal weight. HENT:      Head: Normocephalic and atraumatic. Right Ear: Tympanic membrane and external ear normal.      Left Ear: Tympanic membrane and external ear normal.      Nose: No rhinorrhea. Mouth/Throat:      Mouth: Mucous membranes are moist.      Pharynx: Oropharynx is clear. No oropharyngeal exudate or posterior oropharyngeal erythema. Eyes:      General: No scleral icterus. Extraocular Movements: Extraocular movements intact. Conjunctiva/sclera: Conjunctivae normal.      Pupils: Pupils are equal, round, and reactive to light. Cardiovascular:      Rate and Rhythm: Normal rate and regular rhythm. Pulses: Normal pulses. Heart sounds: Normal heart sounds. No murmur heard. No friction rub. No gallop. Pulmonary:      Effort: Pulmonary effort is normal.      Breath sounds: Normal breath sounds. No wheezing, rhonchi or rales. Abdominal:      General: Bowel sounds are normal. There is no distension. Palpations: Abdomen is soft. There is no mass. Tenderness: There is no abdominal tenderness. There is no right CVA tenderness, left CVA tenderness, guarding or rebound. Musculoskeletal:         General: No deformity. Normal range of motion. Cervical back: Normal range of motion and neck supple. No rigidity. No muscular tenderness. Right lower leg: No edema.       Left lower leg: No edema. Skin:     General: Skin is warm and dry. Capillary Refill: Capillary refill takes less than 2 seconds. Findings: No bruising, erythema or rash. Neurological:      General: No focal deficit present. Mental Status: He is alert and oriented to person, place, and time. Coordination: Coordination normal.      Gait: Gait normal.   Psychiatric:         Mood and Affect: Mood normal.         Behavior: Behavior normal.       ASSESSMENT & PLAN    1. Encounter for wellness examination    - CBC with Auto Differential; Future  - Comprehensive Metabolic Panel; Future  - Lipid, Fasting; Future    2. Essential hypertension  Well controlled    3. Hypercholesterolemia  Recheck    Counseled pt on increasing exercise, good diet choices and try to lose some weight    Return in about 6 months (around 3/23/2023). Electronically signed by JOAQUÍN Candelaria on 9/23/2022      Comment: Please note this report has been produced using speech recognition software and may contain errors related to that system including errors in grammar, punctuation, and spelling, as well as words and phrases that may be inappropriate. If there are any questions or concerns please feel free to contact the dictating provider for clarification.

## 2022-10-05 NOTE — BRIEF OP NOTE
Health Maintenance and immunizations reviewed/ updated.   A1C lab needs to be scheduled.     Fit Kit given to pt in clinic, pt aware the fit kit needs to be completed and mail it off.    Brief Postoperative Note      Patient: Carter Vivas  YOB: 1985  MRN: [id number]    Date of Procedure: 8/25/2021    Pre-Op Diagnosis: Abscess of bursa of right knee [M71.061]    Post-Op Diagnosis: Same       Procedure(s):  RIGHT KNEE INCISION AND DRAINAGE    Surgeon(s):  Araceli Caldera DO    Assistant:  * No surgical staff found *    Anesthesia: General    Estimated Blood Loss (mL): Minimal    Complications: None    Specimens:   ID Type Source Tests Collected by Time Destination   1 : KNEE INFECTION CULTURE Tissue Tissue CULTURE, SURGICAL Araceli Caldera DO 8/25/2021 0910        Implants:  * No implants in log *      Drains: * No LDAs found *    Findings: R knee abscess    Electronically signed by Celia Ballesteros DO on 8/25/2021 at 9:17 AM

## 2023-01-05 DIAGNOSIS — I10 ESSENTIAL HYPERTENSION: ICD-10-CM

## 2023-01-05 DIAGNOSIS — F41.8 SITUATIONAL ANXIETY: ICD-10-CM

## 2023-01-06 RX ORDER — LISINOPRIL AND HYDROCHLOROTHIAZIDE 12.5; 1 MG/1; MG/1
1 TABLET ORAL DAILY
Qty: 90 TABLET | Refills: 1 | Status: SHIPPED | OUTPATIENT
Start: 2023-01-06 | End: 2023-04-06

## 2023-03-17 ENCOUNTER — OFFICE VISIT (OUTPATIENT)
Dept: FAMILY MEDICINE CLINIC | Age: 38
End: 2023-03-17
Payer: COMMERCIAL

## 2023-03-17 VITALS
SYSTOLIC BLOOD PRESSURE: 124 MMHG | WEIGHT: 286.2 LBS | BODY MASS INDEX: 42.39 KG/M2 | DIASTOLIC BLOOD PRESSURE: 84 MMHG | HEIGHT: 69 IN | HEART RATE: 71 BPM | OXYGEN SATURATION: 98 %

## 2023-03-17 DIAGNOSIS — R63.5 WEIGHT GAIN: ICD-10-CM

## 2023-03-17 DIAGNOSIS — E78.00 HYPERCHOLESTEROLEMIA: ICD-10-CM

## 2023-03-17 DIAGNOSIS — E78.00 HYPERCHOLESTEROLEMIA: Primary | ICD-10-CM

## 2023-03-17 DIAGNOSIS — N46.9 INFERTILITY MALE: ICD-10-CM

## 2023-03-17 DIAGNOSIS — G47.30 SLEEP APNEA, UNSPECIFIED TYPE: ICD-10-CM

## 2023-03-17 LAB
CHOLEST SERPL-MCNC: 195 MG/DL (ref 0–199)
HDLC SERPL-MCNC: 38 MG/DL (ref 40–60)
LDL CHOLESTEROL CALCULATED: 139 MG/DL
TRIGL SERPL-MCNC: 89 MG/DL (ref 0–150)
TSH SERPL DL<=0.005 MIU/L-ACNC: 2.26 UIU/ML (ref 0.27–4.2)
VLDLC SERPL CALC-MCNC: 18 MG/DL

## 2023-03-17 PROCEDURE — 3074F SYST BP LT 130 MM HG: CPT | Performed by: PHYSICIAN ASSISTANT

## 2023-03-17 PROCEDURE — 99214 OFFICE O/P EST MOD 30 MIN: CPT | Performed by: PHYSICIAN ASSISTANT

## 2023-03-17 PROCEDURE — 3079F DIAST BP 80-89 MM HG: CPT | Performed by: PHYSICIAN ASSISTANT

## 2023-03-17 RX ORDER — CLOBETASOL PROPIONATE 0.46 MG/ML
SOLUTION TOPICAL
COMMUNITY
Start: 2023-02-23

## 2023-03-17 RX ORDER — CICLOPIROX 1 G/100ML
SHAMPOO TOPICAL
COMMUNITY
Start: 2023-02-23

## 2023-03-17 RX ORDER — ROFLUMILAST 3 MG/G
CREAM TOPICAL
COMMUNITY
Start: 2023-02-23

## 2023-03-17 NOTE — PROGRESS NOTES
3/17/2023    Mariela Cortes    Chief Complaint   Patient presents with    6 Month Follow-Up       HPI  History was obtained from pt. Vanessa Anne is a 40 y.o. male with a PMHx as listed below who presents today for 6 month follow up. Hypertension well managed. Getting routine blood work done today in office. Infertility - Mentioned potential issues with infertility. Wife has been off birth control for 8 years and they have been actively trying to conceive recently without any luck. Checking testosterone and thyroid levels today in office. SHARYN - Patient had sleep study scheduled but never followed up with it. Family history of SHARYN. When his parents got a new CPAP, he took their old machine and tried using it. He is not a back sleeper and was unable to tolerate using the machine. Pt mentioned that he has been eating better and working out more and still unable to lose weight. Patient is overdue for tetanus booster but is going on vacation tomorrow we will update his vaccination when he gets back. 1. Hypercholesterolemia    2. Infertility male    3. Weight gain    4.  Sleep apnea, unspecified type           REVIEW OF SYMPTOMS    Review of Systems    PAST MEDICAL HISTORY  Past Medical History:   Diagnosis Date    Hypertension        FAMILY HISTORY  Family History   Problem Relation Age of Onset    Other Mother         Hodgekin's    Cancer Father         Testicular       SOCIAL HISTORY  Social History     Socioeconomic History    Marital status:    Tobacco Use    Smoking status: Never    Smokeless tobacco: Former     Types: Chew     Quit date: 2/4/2017   Vaping Use    Vaping Use: Never used   Substance and Sexual Activity    Alcohol use: Yes     Comment: Occasionally    Drug use: Never    Sexual activity: Yes     Partners: Female        SURGICAL HISTORY  Past Surgical History:   Procedure Laterality Date    INGUINAL HERNIA REPAIR Left 1986    KNEE SURGERY Right 8/25/2021    RIGHT KNEE INCISION AND DRAINAGE performed by Dov Bradley DO at [address] Right 10/14/2021    RIGHT KNEE INCISION AND DRAINAGE performed by Dov Bradley DO at [address] Right 7/6/2021    RIGHT KNEE DEBRIDEMENT INCISION AND DRAINAGE performed by Dov Bradley DO at [address] Right 6/22/2021    RIGHT PATELLA TENDON REPAIR performed by Dov Bradley DO at [facility]  Current Outpatient Medications   Medication Sig Dispense Refill    clobetasol (TEMOVATE) 0.05 % external solution apply to scalp twice a day if needed      ZORYVE 0.3 % CREA       Ciclopirox 1 % SHAM LATHER in scalp LET SIT 5-10 MINUTES then RINSE use every other day      sertraline (ZOLOFT) 50 MG tablet Take 1 tablet by mouth daily 90 tablet 1    lisinopril-hydroCHLOROthiazide (PRINZIDE;ZESTORETIC) 10-12.5 MG per tablet Take 1 tablet by mouth daily 90 tablet 1    fluocinonide (LIDEX) 0.05 % external solution apply to affected area twice a day if needed       No current facility-administered medications for this visit. ALLERGIES  No Known Allergies    PHYSICAL EXAM    /84 (Site: Right Upper Arm, Position: Sitting, Cuff Size: Large Adult)   Pulse 71   Ht 5' 9\" (1.753 m)   Wt 286 lb 3.2 oz (129.8 kg)   SpO2 98%   BMI 42.26 kg/m²     Physical Exam  Constitutional:       Appearance: Normal appearance. He is obese. HENT:      Head: Normocephalic and atraumatic. Right Ear: Tympanic membrane and external ear normal.      Left Ear: Tympanic membrane and external ear normal.      Nose: No rhinorrhea. Mouth/Throat:      Mouth: Mucous membranes are moist.      Pharynx: Oropharynx is clear. No oropharyngeal exudate or posterior oropharyngeal erythema. Eyes:      General: No scleral icterus. Extraocular Movements: Extraocular movements intact. Conjunctiva/sclera: Conjunctivae normal.      Pupils: Pupils are equal, round, and reactive to light. Cardiovascular:      Rate and Rhythm: Normal rate and regular rhythm. Pulses: Normal pulses. Heart sounds: Normal heart sounds. No murmur heard. No friction rub. No gallop. Pulmonary:      Effort: Pulmonary effort is normal.      Breath sounds: Normal breath sounds. No wheezing, rhonchi or rales. Abdominal:      General: Bowel sounds are normal. There is no distension. Palpations: Abdomen is soft. There is no mass. Tenderness: There is no abdominal tenderness. There is no right CVA tenderness, left CVA tenderness, guarding or rebound. Musculoskeletal:         General: No deformity. Normal range of motion. Cervical back: Normal range of motion and neck supple. No rigidity. No muscular tenderness. Right lower leg: No edema. Left lower leg: No edema. Skin:     General: Skin is warm and dry. Capillary Refill: Capillary refill takes less than 2 seconds. Findings: No bruising, erythema or rash. Neurological:      General: No focal deficit present. Mental Status: He is alert and oriented to person, place, and time. Coordination: Coordination normal.      Gait: Gait normal.   Psychiatric:         Mood and Affect: Mood normal.         Behavior: Behavior normal.       ASSESSMENT & PLAN    1. Hypercholesterolemia    - Lipid, Fasting; Future    2. Infertility male  Encouraged good nutrition, sleep and physical activity  Recommend his wife get tested for infertility by her obstetrician  - Kelli Jaimes MD, Urology, [place]  - TSH with Reflex; Future  - Testosterone, free, total; Future    3. Weight gain  Lifestyle modifications have been enacted, pt still struggling, may be related to sleep apnea or endocrine issues  - TSH with Reflex; Future  - Testosterone, free, total; Future    No follow-ups on file.          Electronically signed by JOAQUÍN Rasheed on 3/17/2023      Comment: Please note this report has been produced using speech recognition software and may contain errors related to that system including errors in grammar, punctuation, and spelling, as well as words and phrases that may be inappropriate. If there are any questions or concerns please feel free to contact the dictating provider for clarification.

## 2023-03-21 LAB
SHBG SERPL-SCNC: 21 NMOL/L (ref 11–80)
TESTOST FREE SERPL-MCNC: 67.2 PG/ML (ref 47–244)
TESTOST SERPL-MCNC: 272 NG/DL (ref 220–1000)

## 2023-07-06 DIAGNOSIS — I10 ESSENTIAL HYPERTENSION: ICD-10-CM

## 2023-07-06 DIAGNOSIS — F41.8 SITUATIONAL ANXIETY: ICD-10-CM

## 2023-07-06 RX ORDER — LISINOPRIL AND HYDROCHLOROTHIAZIDE 12.5; 1 MG/1; MG/1
TABLET ORAL
Qty: 90 TABLET | Refills: 1 | Status: SHIPPED | OUTPATIENT
Start: 2023-07-06

## 2023-07-26 NOTE — PATIENT INSTRUCTIONS
Sutures removed  Locked in at 60 degrees then may ambulate at full weightbearing with knee locked at full extension, may increase at 90 degree in ROM knee brace  Continue antibiotic as directed  Continue to work on ROM and strengthening exercises per PT protocol  Follow up in one month This was a shared visit with the DIANNA. I reviewed and verified the documentation and independently performed the documented:

## 2023-12-08 ENCOUNTER — OFFICE VISIT (OUTPATIENT)
Dept: FAMILY MEDICINE CLINIC | Age: 38
End: 2023-12-08
Payer: COMMERCIAL

## 2023-12-08 VITALS
WEIGHT: 285 LBS | SYSTOLIC BLOOD PRESSURE: 120 MMHG | HEART RATE: 64 BPM | DIASTOLIC BLOOD PRESSURE: 86 MMHG | BODY MASS INDEX: 42.21 KG/M2 | HEIGHT: 69 IN | OXYGEN SATURATION: 97 %

## 2023-12-08 DIAGNOSIS — Z00.00 ENCOUNTER FOR WELLNESS EXAMINATION: Primary | ICD-10-CM

## 2023-12-08 DIAGNOSIS — E78.00 HYPERCHOLESTEROLEMIA: ICD-10-CM

## 2023-12-08 DIAGNOSIS — I10 ESSENTIAL HYPERTENSION: ICD-10-CM

## 2023-12-08 DIAGNOSIS — E66.01 MORBID OBESITY WITH BMI OF 40.0-44.9, ADULT (HCC): ICD-10-CM

## 2023-12-08 LAB
ALBUMIN SERPL-MCNC: 4.5 G/DL (ref 3.4–5)
ALBUMIN/GLOB SERPL: 1.9 {RATIO} (ref 1.1–2.2)
ALP SERPL-CCNC: 61 U/L (ref 40–129)
ANION GAP SERPL CALCULATED.3IONS-SCNC: 9 MMOL/L (ref 3–16)
AST SERPL-CCNC: 15 U/L (ref 15–37)
BASOPHILS # BLD: 0 K/UL (ref 0–0.2)
BASOPHILS NFR BLD: 0.5 %
BILIRUB SERPL-MCNC: <0.2 MG/DL (ref 0–1)
BUN SERPL-MCNC: 16 MG/DL (ref 7–20)
CALCIUM SERPL-MCNC: 9.3 MG/DL (ref 8.3–10.6)
CHLORIDE SERPL-SCNC: 104 MMOL/L (ref 99–110)
CHOLEST SERPL-MCNC: 170 MG/DL (ref 0–199)
CO2 SERPL-SCNC: 30 MMOL/L (ref 21–32)
CREAT SERPL-MCNC: 0.8 MG/DL (ref 0.9–1.3)
DEPRECATED RDW RBC AUTO: 13.2 % (ref 12.4–15.4)
EOSINOPHIL # BLD: 0.1 K/UL (ref 0–0.6)
EOSINOPHIL NFR BLD: 1.2 %
GFR SERPLBLD CREATININE-BSD FMLA CKD-EPI: >60 ML/MIN/{1.73_M2}
GLUCOSE SERPL-MCNC: 91 MG/DL (ref 70–99)
HCT VFR BLD AUTO: 44.9 % (ref 40.5–52.5)
HDLC SERPL-MCNC: 37 MG/DL (ref 40–60)
HGB BLD-MCNC: 15.2 G/DL (ref 13.5–17.5)
LDL CHOLESTEROL CALCULATED: 119 MG/DL
LYMPHOCYTES # BLD: 2.1 K/UL (ref 1–5.1)
LYMPHOCYTES NFR BLD: 36.5 %
MCH RBC QN AUTO: 30.1 PG (ref 26–34)
MCHC RBC AUTO-ENTMCNC: 33.9 G/DL (ref 31–36)
MCV RBC AUTO: 88.8 FL (ref 80–100)
MONOCYTES # BLD: 0.5 K/UL (ref 0–1.3)
MONOCYTES NFR BLD: 9.2 %
NEUTROPHILS # BLD: 3 K/UL (ref 1.7–7.7)
NEUTROPHILS NFR BLD: 52.6 %
PLATELET # BLD AUTO: 260 K/UL (ref 135–450)
PMV BLD AUTO: 8.3 FL (ref 5–10.5)
POTASSIUM SERPL-SCNC: 4.4 MMOL/L (ref 3.5–5.1)
PROT SERPL-MCNC: 6.9 G/DL (ref 6.4–8.2)
RBC # BLD AUTO: 5.05 M/UL (ref 4.2–5.9)
SODIUM SERPL-SCNC: 143 MMOL/L (ref 136–145)
TRIGL SERPL-MCNC: 72 MG/DL (ref 0–150)
VLDLC SERPL CALC-MCNC: 14 MG/DL
WBC # BLD AUTO: 5.6 K/UL (ref 4–11)

## 2023-12-08 PROCEDURE — 3079F DIAST BP 80-89 MM HG: CPT | Performed by: PHYSICIAN ASSISTANT

## 2023-12-08 PROCEDURE — 99214 OFFICE O/P EST MOD 30 MIN: CPT | Performed by: PHYSICIAN ASSISTANT

## 2023-12-08 PROCEDURE — 3074F SYST BP LT 130 MM HG: CPT | Performed by: PHYSICIAN ASSISTANT

## 2023-12-08 PROCEDURE — 99395 PREV VISIT EST AGE 18-39: CPT | Performed by: PHYSICIAN ASSISTANT

## 2023-12-08 RX ORDER — PHENTERMINE HYDROCHLORIDE 37.5 MG/1
37.5 TABLET ORAL
Qty: 30 TABLET | Refills: 0 | Status: SHIPPED | OUTPATIENT
Start: 2023-12-08 | End: 2024-01-07

## 2023-12-08 RX ORDER — PHENTERMINE HYDROCHLORIDE 37.5 MG/1
37.5 TABLET ORAL
Qty: 30 TABLET | Refills: 0 | Status: SHIPPED | OUTPATIENT
Start: 2023-12-08 | End: 2023-12-08 | Stop reason: SDUPTHER

## 2023-12-08 ASSESSMENT — PATIENT HEALTH QUESTIONNAIRE - PHQ9
2. FEELING DOWN, DEPRESSED OR HOPELESS: 0
SUM OF ALL RESPONSES TO PHQ9 QUESTIONS 1 & 2: 0
SUM OF ALL RESPONSES TO PHQ QUESTIONS 1-9: 0
1. LITTLE INTEREST OR PLEASURE IN DOING THINGS: 0
SUM OF ALL RESPONSES TO PHQ QUESTIONS 1-9: 0

## 2023-12-14 LAB
SHBG SERPL-SCNC: 24 NMOL/L (ref 11–80)
TESTOST FREE SERPL-MCNC: 70.5 PG/ML (ref 47–244)
TESTOST SERPL-MCNC: 301 NG/DL (ref 220–1000)

## 2023-12-30 DIAGNOSIS — F41.8 SITUATIONAL ANXIETY: ICD-10-CM

## 2023-12-30 DIAGNOSIS — I10 ESSENTIAL HYPERTENSION: ICD-10-CM

## 2024-01-02 RX ORDER — LISINOPRIL AND HYDROCHLOROTHIAZIDE 12.5; 1 MG/1; MG/1
TABLET ORAL
Qty: 90 TABLET | Refills: 1 | OUTPATIENT
Start: 2024-01-02

## 2024-01-05 ENCOUNTER — OFFICE VISIT (OUTPATIENT)
Dept: FAMILY MEDICINE CLINIC | Age: 39
End: 2024-01-05
Payer: COMMERCIAL

## 2024-01-05 VITALS
DIASTOLIC BLOOD PRESSURE: 80 MMHG | HEIGHT: 69 IN | WEIGHT: 278 LBS | OXYGEN SATURATION: 95 % | SYSTOLIC BLOOD PRESSURE: 128 MMHG | HEART RATE: 71 BPM | BODY MASS INDEX: 41.18 KG/M2

## 2024-01-05 DIAGNOSIS — E66.01 MORBID OBESITY WITH BMI OF 40.0-44.9, ADULT (HCC): ICD-10-CM

## 2024-01-05 PROCEDURE — 99213 OFFICE O/P EST LOW 20 MIN: CPT | Performed by: PHYSICIAN ASSISTANT

## 2024-01-05 PROCEDURE — 3074F SYST BP LT 130 MM HG: CPT | Performed by: PHYSICIAN ASSISTANT

## 2024-01-05 PROCEDURE — 3079F DIAST BP 80-89 MM HG: CPT | Performed by: PHYSICIAN ASSISTANT

## 2024-01-05 RX ORDER — PHENTERMINE HYDROCHLORIDE 37.5 MG/1
37.5 TABLET ORAL
Qty: 30 TABLET | Refills: 0 | Status: SHIPPED | OUTPATIENT
Start: 2024-01-05 | End: 2024-02-04

## 2024-01-05 SDOH — ECONOMIC STABILITY: HOUSING INSECURITY
IN THE LAST 12 MONTHS, WAS THERE A TIME WHEN YOU DID NOT HAVE A STEADY PLACE TO SLEEP OR SLEPT IN A SHELTER (INCLUDING NOW)?: NO

## 2024-01-05 SDOH — ECONOMIC STABILITY: INCOME INSECURITY: HOW HARD IS IT FOR YOU TO PAY FOR THE VERY BASICS LIKE FOOD, HOUSING, MEDICAL CARE, AND HEATING?: NOT VERY HARD

## 2024-01-05 SDOH — ECONOMIC STABILITY: FOOD INSECURITY: WITHIN THE PAST 12 MONTHS, THE FOOD YOU BOUGHT JUST DIDN'T LAST AND YOU DIDN'T HAVE MONEY TO GET MORE.: NEVER TRUE

## 2024-01-05 SDOH — ECONOMIC STABILITY: FOOD INSECURITY: WITHIN THE PAST 12 MONTHS, YOU WORRIED THAT YOUR FOOD WOULD RUN OUT BEFORE YOU GOT MONEY TO BUY MORE.: NEVER TRUE

## 2024-01-05 ASSESSMENT — PATIENT HEALTH QUESTIONNAIRE - PHQ9
1. LITTLE INTEREST OR PLEASURE IN DOING THINGS: 0
SUM OF ALL RESPONSES TO PHQ QUESTIONS 1-9: 0
SUM OF ALL RESPONSES TO PHQ9 QUESTIONS 1 & 2: 0
SUM OF ALL RESPONSES TO PHQ QUESTIONS 1-9: 0
2. FEELING DOWN, DEPRESSED OR HOPELESS: 0

## 2024-01-05 NOTE — PROGRESS NOTES
1/5/2024    Crow Castellano    Chief Complaint   Patient presents with    1 Month Follow-Up     Adipex f/u       HPI  History was obtained from pt.  Crow is a 38 y.o. male with a PMHx as listed below who presents today for 1 month follow up for adipex. Has lost at least 7 pounds this month.    Has a very low appetite at this point, and has cut back on his snacking  He has been doing intermittent fasting.  Trying to hit the gym again    No negative side effects      1. Morbid obesity with BMI of 40.0-44.9, adult ([facility])             REVIEW OF SYMPTOMS    Review of Systems    PAST MEDICAL HISTORY  Past Medical History:   Diagnosis Date    ADHD (attention deficit hyperactivity disorder) Not sure of the date    Took Medicine when i was in grade school. Not longer do anything for it.    Anxiety 2015    Depression 2015    Hypertension     Sleep apnea        FAMILY HISTORY  Family History   Problem Relation Age of Onset    Other Mother         Adriano's    Cancer Father         Testicular       SOCIAL HISTORY  Social History     Socioeconomic History    Marital status:      Spouse name: None    Number of children: None    Years of education: None    Highest education level: None   Tobacco Use    Smoking status: Never    Smokeless tobacco: Former     Types: Chew     Quit date: 2/4/2017   Vaping Use    Vaping Use: Never used   Substance and Sexual Activity    Alcohol use: Not Currently     Comment: Occasionally    Drug use: Never    Sexual activity: Yes     Partners: Female     Social Determinants of Health     Financial Resource Strain: Low Risk  (1/5/2024)    Overall Financial Resource Strain (CARDIA)     Difficulty of Paying Living Expenses: Not very hard   Food Insecurity: No Food Insecurity (1/5/2024)    Hunger Vital Sign     Worried About Running Out of Food in the Last Year: Never true     Ran Out of Food in the Last Year: Never true   Transportation Needs: Unknown (1/5/2024)    PRAPARE - Transportation     Lack

## 2024-02-05 ENCOUNTER — OFFICE VISIT (OUTPATIENT)
Dept: FAMILY MEDICINE CLINIC | Age: 39
End: 2024-02-05
Payer: COMMERCIAL

## 2024-02-05 VITALS
WEIGHT: 274.9 LBS | HEART RATE: 86 BPM | SYSTOLIC BLOOD PRESSURE: 132 MMHG | OXYGEN SATURATION: 96 % | HEIGHT: 69 IN | DIASTOLIC BLOOD PRESSURE: 88 MMHG | BODY MASS INDEX: 40.72 KG/M2

## 2024-02-05 DIAGNOSIS — E66.01 MORBID OBESITY WITH BMI OF 40.0-44.9, ADULT (HCC): ICD-10-CM

## 2024-02-05 PROCEDURE — 99213 OFFICE O/P EST LOW 20 MIN: CPT | Performed by: STUDENT IN AN ORGANIZED HEALTH CARE EDUCATION/TRAINING PROGRAM

## 2024-02-05 PROCEDURE — 3079F DIAST BP 80-89 MM HG: CPT | Performed by: STUDENT IN AN ORGANIZED HEALTH CARE EDUCATION/TRAINING PROGRAM

## 2024-02-05 PROCEDURE — 3075F SYST BP GE 130 - 139MM HG: CPT | Performed by: STUDENT IN AN ORGANIZED HEALTH CARE EDUCATION/TRAINING PROGRAM

## 2024-02-05 RX ORDER — PHENTERMINE HYDROCHLORIDE 37.5 MG/1
37.5 TABLET ORAL
Qty: 30 TABLET | Refills: 0 | Status: SHIPPED | OUTPATIENT
Start: 2024-02-05 | End: 2024-03-06

## 2024-02-05 NOTE — PROGRESS NOTES
2/11/2024    Crow Castellano    Chief Complaint   Patient presents with    1 Month Follow-Up     Adipex f/u        HPI  History was obtained from patient.  Crow is a 38 y.o. male with a PMHx as listed below who presents today for follow up on weight management. No acute complaints.       1. Morbid obesity with BMI of 40.0-44.9, adult (HCC)             REVIEW OF SYMPTOMS    Review of Systems   Constitutional:  Negative for chills and fatigue.   HENT:  Negative for congestion and sore throat.    Respiratory:  Negative for shortness of breath and wheezing.    Cardiovascular:  Negative for chest pain and palpitations.   Gastrointestinal:  Negative for abdominal pain and nausea.   Genitourinary:  Negative for frequency and urgency.   Neurological:  Negative for light-headedness.       PAST MEDICAL HISTORY  Past Medical History:   Diagnosis Date    ADHD (attention deficit hyperactivity disorder) Not sure of the date    Took Medicine when i was in grade school. Not longer do anything for it.    Anxiety 2015    Depression 2015    Hypertension     Sleep apnea        FAMILY HISTORY  Family History   Problem Relation Age of Onset    Other Mother         Adriano's    Cancer Father         Testicular       SOCIAL HISTORY  Social History     Socioeconomic History    Marital status:    Tobacco Use    Smoking status: Never    Smokeless tobacco: Former     Types: Chew     Quit date: 2/4/2017   Vaping Use    Vaping Use: Never used   Substance and Sexual Activity    Alcohol use: Not Currently     Comment: Occasionally    Drug use: Never    Sexual activity: Yes     Partners: Female     Social Determinants of Health     Financial Resource Strain: Low Risk  (1/5/2024)    Overall Financial Resource Strain (CARDIA)     Difficulty of Paying Living Expenses: Not very hard   Food Insecurity: No Food Insecurity (1/5/2024)    Hunger Vital Sign     Worried About Running Out of Food in the Last Year: Never true     Ran Out of Food in the

## 2024-02-07 DIAGNOSIS — I10 ESSENTIAL HYPERTENSION: ICD-10-CM

## 2024-02-07 DIAGNOSIS — F41.8 SITUATIONAL ANXIETY: ICD-10-CM

## 2024-02-07 RX ORDER — LISINOPRIL AND HYDROCHLOROTHIAZIDE 12.5; 1 MG/1; MG/1
TABLET ORAL
Qty: 90 TABLET | Refills: 1 | Status: SHIPPED | OUTPATIENT
Start: 2024-02-07

## 2024-03-04 ENCOUNTER — OFFICE VISIT (OUTPATIENT)
Dept: FAMILY MEDICINE CLINIC | Age: 39
End: 2024-03-04
Payer: COMMERCIAL

## 2024-03-04 VITALS
DIASTOLIC BLOOD PRESSURE: 80 MMHG | WEIGHT: 270 LBS | OXYGEN SATURATION: 98 % | HEIGHT: 69 IN | HEART RATE: 80 BPM | BODY MASS INDEX: 39.99 KG/M2 | SYSTOLIC BLOOD PRESSURE: 126 MMHG

## 2024-03-04 DIAGNOSIS — R51.9 GENERALIZED HEADACHE: Primary | ICD-10-CM

## 2024-03-04 DIAGNOSIS — K03.81 CRACKED TOOTH: ICD-10-CM

## 2024-03-04 DIAGNOSIS — I10 ESSENTIAL HYPERTENSION: ICD-10-CM

## 2024-03-04 PROCEDURE — 3079F DIAST BP 80-89 MM HG: CPT | Performed by: PHYSICIAN ASSISTANT

## 2024-03-04 PROCEDURE — 3074F SYST BP LT 130 MM HG: CPT | Performed by: PHYSICIAN ASSISTANT

## 2024-03-04 PROCEDURE — 99213 OFFICE O/P EST LOW 20 MIN: CPT | Performed by: PHYSICIAN ASSISTANT

## 2024-03-04 RX ORDER — CLOBETASOL PROPIONATE 0.46 MG/ML
SOLUTION TOPICAL
Qty: 1 EACH | Refills: 3 | Status: CANCELLED | OUTPATIENT
Start: 2024-03-04

## 2024-03-04 NOTE — PROGRESS NOTES
sertraline (ZOLOFT) 50 MG tablet take 1 tablet by mouth once daily 90 tablet 1    lisinopril-hydroCHLOROthiazide (PRINZIDE;ZESTORETIC) 10-12.5 MG per tablet take 1 tablet by mouth once daily 90 tablet 1    phentermine (ADIPEX-P) 37.5 MG tablet Take 1 tablet by mouth every morning (before breakfast) for 30 days. Max Daily Amount: 37.5 mg 30 tablet 0    clobetasol (TEMOVATE) 0.05 % external solution apply to scalp twice a day if needed      ZORYVE 0.3 % CREA       Ciclopirox 1 % SHAM LATHER in scalp LET SIT 5-10 MINUTES then RINSE use every other day       No current facility-administered medications for this visit.       ALLERGIES  No Known Allergies    PHYSICAL EXAM    /80 (Site: Left Upper Arm, Position: Sitting, Cuff Size: Large Adult)   Pulse 80   Ht 1.753 m (5' 9\")   Wt 122.5 kg (270 lb)   SpO2 98%   BMI 39.87 kg/m²     Constitutional:  Well developed, well nourished.  No acute distress.  HENT:  Normocephalic, atraumatic, bilateral external ears normal, bilateral ear canals and Tms normal, oropharynx moist, nose normal  Eyes:  PERRLA, EOMI, conjunctiva normal, no discharge, no scleral icterus  Neck:  No tenderness, supple  Lymphatic:  No lymphadenopathy noted  Cardiovascular:  Normal heart rate, normal rhythm, no murmurs, gallops or rubs  Thorax & Lungs:  Normal breath sounds, no respiratory distress, no wheezing, no rales, no rhonchi  Skin:  Warm, dry, no erythema, no rash  Neurologic:  Alert & oriented X 3, normal motor function, normal sensory function, no focal deficits noted  Psychiatric:  Affect normal, mood normal    ASSESSMENT & PLAN    Crow was seen today for hypertension.    Diagnoses and all orders for this visit:    Generalized headache  Resolved.  Call the office if headaches return    Essential hypertension  Well-controlled.  /80 in office.  Continue lisinopril/hydrochlorothiazide.     Cracked tooth  Follow-up with dentist this month as planned    There are no discontinued

## 2024-03-07 DIAGNOSIS — I10 ESSENTIAL HYPERTENSION: ICD-10-CM

## 2024-03-07 DIAGNOSIS — F41.8 SITUATIONAL ANXIETY: ICD-10-CM

## 2024-03-08 RX ORDER — LISINOPRIL AND HYDROCHLOROTHIAZIDE 12.5; 1 MG/1; MG/1
1 TABLET ORAL DAILY
Qty: 90 TABLET | Refills: 1 | OUTPATIENT
Start: 2024-03-08

## 2024-03-09 DIAGNOSIS — I10 ESSENTIAL HYPERTENSION: ICD-10-CM

## 2024-03-09 DIAGNOSIS — F41.8 SITUATIONAL ANXIETY: ICD-10-CM

## 2024-03-13 RX ORDER — LISINOPRIL AND HYDROCHLOROTHIAZIDE 12.5; 1 MG/1; MG/1
1 TABLET ORAL DAILY
Qty: 90 TABLET | Refills: 1 | OUTPATIENT
Start: 2024-03-13

## 2024-06-14 ENCOUNTER — TELEPHONE (OUTPATIENT)
Dept: FAMILY MEDICINE CLINIC | Age: 39
End: 2024-06-14

## 2024-06-14 ENCOUNTER — OFFICE VISIT (OUTPATIENT)
Dept: FAMILY MEDICINE CLINIC | Age: 39
End: 2024-06-14
Payer: COMMERCIAL

## 2024-06-14 VITALS
DIASTOLIC BLOOD PRESSURE: 88 MMHG | SYSTOLIC BLOOD PRESSURE: 126 MMHG | HEART RATE: 95 BPM | BODY MASS INDEX: 40.43 KG/M2 | HEIGHT: 69 IN | OXYGEN SATURATION: 96 % | WEIGHT: 273 LBS

## 2024-06-14 DIAGNOSIS — R06.83 LOUD SNORING: Primary | ICD-10-CM

## 2024-06-14 DIAGNOSIS — I10 ESSENTIAL HYPERTENSION: ICD-10-CM

## 2024-06-14 DIAGNOSIS — F41.8 SITUATIONAL ANXIETY: ICD-10-CM

## 2024-06-14 DIAGNOSIS — E66.01 MORBID OBESITY WITH BMI OF 40.0-44.9, ADULT (HCC): ICD-10-CM

## 2024-06-14 PROBLEM — L03.115 CELLULITIS OF RIGHT KNEE: Status: RESOLVED | Noted: 2021-07-04 | Resolved: 2024-06-14

## 2024-06-14 PROBLEM — M00.9 INFECTION OF RIGHT KNEE (HCC): Status: RESOLVED | Noted: 2021-10-12 | Resolved: 2024-06-14

## 2024-06-14 PROBLEM — M00.9 CHRONIC INFECTION OF RIGHT KNEE (HCC): Status: RESOLVED | Noted: 2021-10-14 | Resolved: 2024-06-14

## 2024-06-14 PROBLEM — A49.01 MSSA (METHICILLIN SUSCEPTIBLE STAPHYLOCOCCUS AUREUS) INFECTION: Status: RESOLVED | Noted: 2021-07-28 | Resolved: 2024-06-14

## 2024-06-14 PROBLEM — Z79.2 RECEIVING INTRAVENOUS ANTIBIOTIC TREATMENT AS OUTPATIENT: Status: RESOLVED | Noted: 2021-07-28 | Resolved: 2024-06-14

## 2024-06-14 PROCEDURE — 3074F SYST BP LT 130 MM HG: CPT

## 2024-06-14 PROCEDURE — 3079F DIAST BP 80-89 MM HG: CPT

## 2024-06-14 PROCEDURE — 99214 OFFICE O/P EST MOD 30 MIN: CPT

## 2024-06-14 RX ORDER — PHENTERMINE AND TOPIRAMATE 7.5; 46 MG/1; MG/1
1 CAPSULE, EXTENDED RELEASE ORAL DAILY
Qty: 30 CAPSULE | Refills: 2 | Status: SHIPPED | OUTPATIENT
Start: 2024-06-28 | End: 2024-09-26

## 2024-06-14 RX ORDER — PHENTERMINE AND TOPIRAMATE 3.75; 23 MG/1; MG/1
1 CAPSULE, EXTENDED RELEASE ORAL DAILY
Qty: 14 CAPSULE | Refills: 0 | Status: SHIPPED | OUTPATIENT
Start: 2024-06-14 | End: 2024-06-28

## 2024-06-14 ASSESSMENT — SLEEP AND FATIGUE QUESTIONNAIRES
HOW LIKELY ARE YOU TO NOD OFF OR FALL ASLEEP WHILE SITTING INACTIVE IN A PUBLIC PLACE: WOULD NEVER DOZE
ESS TOTAL SCORE: 12
NECK CIRCUMFERENCE (INCHES): 19.5
HOW LIKELY ARE YOU TO NOD OFF OR FALL ASLEEP WHEN YOU ARE A PASSENGER IN A CAR FOR AN HOUR WITHOUT A BREAK: WOULD NEVER DOZE
HOW LIKELY ARE YOU TO NOD OFF OR FALL ASLEEP IN A CAR, WHILE STOPPED FOR A FEW MINUTES IN TRAFFIC: WOULD NEVER DOZE
HOW LIKELY ARE YOU TO NOD OFF OR FALL ASLEEP WHILE LYING DOWN TO REST IN THE AFTERNOON WHEN CIRCUMSTANCES PERMIT: HIGH CHANCE OF DOZING
HOW LIKELY ARE YOU TO NOD OFF OR FALL ASLEEP WHILE WATCHING TV: HIGH CHANCE OF DOZING
HOW LIKELY ARE YOU TO NOD OFF OR FALL ASLEEP WHILE SITTING AND TALKING TO SOMEONE: WOULD NEVER DOZE
HOW LIKELY ARE YOU TO NOD OFF OR FALL ASLEEP WHILE SITTING AND READING: HIGH CHANCE OF DOZING

## 2024-06-14 ASSESSMENT — ENCOUNTER SYMPTOMS
ABDOMINAL PAIN: 0
NAUSEA: 0
CONSTIPATION: 0
COLOR CHANGE: 0
DIARRHEA: 0
BLOOD IN STOOL: 0
VOMITING: 0
SHORTNESS OF BREATH: 0

## 2024-06-14 NOTE — PROGRESS NOTES
6/14/2024    Crow Castellano    Chief Complaint   Patient presents with    Sleep Apnea     Pt was diagnosed with sleep apnea, never followed through with treatment, would like to do a sleep study at home to get possible treatment options, pt reports waking up tired and fatigued, waking up in the middle of the night    Weight Management     Would like to discuss re-starting Adipex    New Patient     Est care       HPI  History was obtained from patient.  Crow is a pleasant 38 y.o. male who presents today to establish care. Former patient of Rene Jiang PA-C. He is  to his wife of 7 years, Livia. No children. He works as a [employer]. He enjoys playing golf, fishing hunting, hiking.     He does not follow with any additional healthcare providers.    PMH: HTN, rupture of right patellar tendon, male infertility, situational anxiety,  MRSA infection, ADD, hypercholesterolemia, suspected SHARYN    Surgical: Right patellar tendon repair, right knee debridement and incision and drainage, left inguinal hernia repair    Family: Significant for Hodgkin's lymphoma in his mother.  And testicular cancer in his father.    Social:  Smoker/Nicotine use: Former chewing tobacco user.  Alcohol use: Not current  Recreational drug use: Never  Sexually active: Yes, female    Suspected SHARYN: Orders for home sleep study placed in summer 2019. His father upgraded his CPAP at the same time and gave him his old CPAP and he never got the sleep study done.     STOP-BANG Score for Obstructive Sleep Apnea from [email or web address]  on 6/14/2024  ** All calculations should be rechecked by clinician prior to use **    RESULT SUMMARY:  7 points  STOP-BANG    High   Risk for moderate to severe SHARYN      INPUTS:  Do you snore loudly? --> 1 = Yes  Do you often feel tired, fatigued, or sleepy during the daytime? --> 1 = Yes  Has anyone observed you stop breathing during sleep? --> 1 = Yes  Do you have (or are you being treated for) high blood pressure?

## 2024-06-14 NOTE — PROGRESS NOTES
PA initiated, Crow Castellano (Key: [id number])  Rx #: [id number]  Need Help? Call us at [phone]  Outcome  Additional Information Required  ePA request already received at 2024-06-14 13:01:08.286 with epa ref id [id number]  Drug  Qsymia 3.75-23MG er capsules    Form  [employer] Electronic PA Form (2017 NCPDP)  Original Claim Info  75 CALL HELP DESK

## 2024-06-14 NOTE — TELEPHONE ENCOUNTER
Crow Castellano (Key: [id number])  Rx #: [id number]  Need Help? Call us at [phone]  Outcome  Additional Information Required  ePA request already received at 2024-06-14 13:01:08.286 with epa ref id [id number]  Drug  Qsymia 3.75-23MG er capsules    Form  [employer] Electronic PA Form (2017 NCPDP)  Original Claim Info  75 CALL HELP DESK

## 2024-07-01 DIAGNOSIS — E66.01 MORBID OBESITY WITH BMI OF 40.0-44.9, ADULT (HCC): ICD-10-CM

## 2024-07-01 DIAGNOSIS — F41.8 SITUATIONAL ANXIETY: ICD-10-CM

## 2024-07-01 DIAGNOSIS — I10 ESSENTIAL HYPERTENSION: ICD-10-CM

## 2024-07-01 RX ORDER — PHENTERMINE AND TOPIRAMATE 7.5; 46 MG/1; MG/1
1 CAPSULE, EXTENDED RELEASE ORAL DAILY
Qty: 30 CAPSULE | Refills: 2 | Status: SHIPPED | OUTPATIENT
Start: 2024-07-01 | End: 2024-09-29

## 2024-07-01 RX ORDER — LISINOPRIL AND HYDROCHLOROTHIAZIDE 12.5; 1 MG/1; MG/1
1 TABLET ORAL DAILY
Qty: 90 TABLET | Refills: 1 | Status: SHIPPED | OUTPATIENT
Start: 2024-07-01

## 2024-07-05 ENCOUNTER — HOSPITAL ENCOUNTER (OUTPATIENT)
Dept: SLEEP CENTER | Age: 39
Discharge: HOME | End: 2024-07-05
Payer: COMMERCIAL

## 2024-07-05 VITALS
SYSTOLIC BLOOD PRESSURE: 132 MMHG | WEIGHT: 270 LBS | OXYGEN SATURATION: 94 % | HEIGHT: 69 IN | HEART RATE: 64 BPM | DIASTOLIC BLOOD PRESSURE: 70 MMHG | BODY MASS INDEX: 39.99 KG/M2

## 2024-07-05 DIAGNOSIS — G47.10 HYPERSOMNOLENCE: Primary | ICD-10-CM

## 2024-07-05 DIAGNOSIS — R06.83 SNORING: ICD-10-CM

## 2024-07-05 PROCEDURE — 99211 OFF/OP EST MAY X REQ PHY/QHP: CPT

## 2024-07-05 ASSESSMENT — SLEEP AND FATIGUE QUESTIONNAIRES
HOW LIKELY ARE YOU TO NOD OFF OR FALL ASLEEP IN A CAR, WHILE STOPPED FOR A FEW MINUTES IN TRAFFIC: WOULD NEVER DOZE
HOW LIKELY ARE YOU TO NOD OFF OR FALL ASLEEP WHILE SITTING AND TALKING TO SOMEONE: WOULD NEVER DOZE
HOW LIKELY ARE YOU TO NOD OFF OR FALL ASLEEP WHILE WATCHING TV: HIGH CHANCE OF DOZING
HOW LIKELY ARE YOU TO NOD OFF OR FALL ASLEEP WHEN YOU ARE A PASSENGER IN A CAR FOR AN HOUR WITHOUT A BREAK: WOULD NEVER DOZE
HOW LIKELY ARE YOU TO NOD OFF OR FALL ASLEEP WHILE SITTING INACTIVE IN A PUBLIC PLACE: SLIGHT CHANCE OF DOZING
HOW LIKELY ARE YOU TO NOD OFF OR FALL ASLEEP WHILE LYING DOWN TO REST IN THE AFTERNOON WHEN CIRCUMSTANCES PERMIT: HIGH CHANCE OF DOZING
HOW LIKELY ARE YOU TO NOD OFF OR FALL ASLEEP WHILE SITTING AND READING: SLIGHT CHANCE OF DOZING
HOW LIKELY ARE YOU TO NOD OFF OR FALL ASLEEP WHILE SITTING QUIETLY AFTER LUNCH WITHOUT ALCOHOL: HIGH CHANCE OF DOZING
NECK CIRCUMFERENCE (INCHES): 19
ESS TOTAL SCORE: 11

## 2024-07-18 ENCOUNTER — HOSPITAL ENCOUNTER (OUTPATIENT)
Dept: SLEEP CENTER | Age: 39
Discharge: HOME | End: 2024-07-18
Payer: COMMERCIAL

## 2024-07-18 DIAGNOSIS — R06.83 SNORING: ICD-10-CM

## 2024-07-18 DIAGNOSIS — G47.10 HYPERSOMNOLENCE: ICD-10-CM

## 2024-07-18 PROCEDURE — G0399 HOME SLEEP TEST/TYPE 3 PORTA: HCPCS

## 2024-07-18 NOTE — PROGRESS NOTES
Crow WILLIAM Castellano  1985  arrived at [facility] on 7[facility]18/2024 for set up and instruction of home sleep study with the [facility] unit.  he was instructed on proper set-up and operation of HST unit. Written instructions with set up diagram given for reference and reinforcement of verbal instruction and demonstration. he was able to return demonstration appropriately. No home environment, vision, dexterity, comprehension concerns with this patient based on completed forms and patient interactions. Patient will return unit after one night as instructed.    Electronically signed by Leeanna Delatorre on 7/18/2024 at 4:34 PM

## 2024-08-02 ENCOUNTER — HOSPITAL ENCOUNTER (OUTPATIENT)
Dept: SLEEP CENTER | Age: 39
Discharge: HOME | End: 2024-08-02
Payer: COMMERCIAL

## 2024-08-02 DIAGNOSIS — G47.33 OSA (OBSTRUCTIVE SLEEP APNEA): Primary | ICD-10-CM

## 2024-08-02 PROCEDURE — 99211 OFF/OP EST MAY X REQ PHY/QHP: CPT

## 2024-08-02 NOTE — PROGRESS NOTES
[facility]      Mat Washington MD, FACP, [facility]  Salty Schultz MD, [facility]  Tre Ramírez MD, [facility]  Veronica Serna DO  Brenda Tomlinson DO      Shirley [address]ARaquel [address]  [address]  [place] [address]   PH: [phone]  F: [phone]     Subjective:     Patient ID: Crow Castellano is a 38 y.o. male, following up today with the sleep center.     Reason for Follow Up/Chief Complaint:   Chief Complaint   Patient presents with    G47.10    R06.83       Results:HST:Mod SHARYN.AHI 16.RDI 22    History: EDS.    Social History     Socioeconomic History    Marital status:      Spouse name: Not on file    Number of children: Not on file    Years of education: Not on file    Highest education level: Not on file   Occupational History    Not on file   Tobacco Use    Smoking status: Never    Smokeless tobacco: Former     Types: Chew     Quit date: 2/4/2017   Vaping Use    Vaping Use: Never used   Substance and Sexual Activity    Alcohol use: Not Currently     Comment: Occasionally    Drug use: Never    Sexual activity: Yes     Partners: Female   Other Topics Concern    Not on file   Social History Narrative    Not on file     Social Determinants of Health     Financial Resource Strain: Low Risk  (1/5/2024)    Overall Financial Resource Strain (CARDIA)     Difficulty of Paying Living Expenses: Not very hard   Food Insecurity: No Food Insecurity (1/5/2024)    Hunger Vital Sign     Worried About Running Out of Food in the Last Year: Never true     Ran Out of Food in the Last Year: Never true   Transportation Needs: Unknown (1/5/2024)    PRAPARE - Transportation     Lack of Transportation (Medical): Not on file     Lack of Transportation (Non-Medical): No   Physical Activity: Not on file   Stress: Not on file   Social Connections: Not on file   Intimate Partner Violence: Not on file   Housing Stability: Unknown (1/5/2024)    Housing Stability Vital Sign     Unable to Pay for Housing in the Last Year:

## 2024-08-29 DIAGNOSIS — E66.01 MORBID OBESITY WITH BMI OF 40.0-44.9, ADULT (HCC): ICD-10-CM

## 2024-08-29 RX ORDER — PHENTERMINE AND TOPIRAMATE 7.5; 46 MG/1; MG/1
1 CAPSULE, EXTENDED RELEASE ORAL DAILY
Qty: 30 CAPSULE | Refills: 2 | Status: SHIPPED | OUTPATIENT
Start: 2024-09-12 | End: 2024-12-11

## 2024-08-29 RX ORDER — PHENTERMINE AND TOPIRAMATE 3.75; 23 MG/1; MG/1
1 CAPSULE, EXTENDED RELEASE ORAL DAILY
Qty: 14 CAPSULE | Refills: 0 | Status: SHIPPED | OUTPATIENT
Start: 2024-08-29 | End: 2024-09-12

## 2024-10-29 DIAGNOSIS — I10 ESSENTIAL HYPERTENSION: ICD-10-CM

## 2024-10-30 RX ORDER — LISINOPRIL AND HYDROCHLOROTHIAZIDE 10; 12.5 MG/1; MG/1
1 TABLET ORAL DAILY
Qty: 90 TABLET | Refills: 1 | Status: SHIPPED | OUTPATIENT
Start: 2024-10-30

## 2024-12-01 DIAGNOSIS — F41.8 SITUATIONAL ANXIETY: ICD-10-CM

## 2024-12-14 DIAGNOSIS — I10 ESSENTIAL HYPERTENSION: ICD-10-CM

## 2024-12-16 RX ORDER — LISINOPRIL AND HYDROCHLOROTHIAZIDE 10; 12.5 MG/1; MG/1
1 TABLET ORAL DAILY
Qty: 90 TABLET | Refills: 1 | Status: SHIPPED | OUTPATIENT
Start: 2024-12-16

## 2025-01-03 ENCOUNTER — OFFICE VISIT (OUTPATIENT)
Dept: FAMILY MEDICINE CLINIC | Age: 40
End: 2025-01-03

## 2025-01-03 VITALS
SYSTOLIC BLOOD PRESSURE: 126 MMHG | OXYGEN SATURATION: 97 % | BODY MASS INDEX: 41.89 KG/M2 | HEIGHT: 69 IN | DIASTOLIC BLOOD PRESSURE: 70 MMHG | RESPIRATION RATE: 18 BRPM | WEIGHT: 282.8 LBS | HEART RATE: 72 BPM

## 2025-01-03 DIAGNOSIS — I10 ESSENTIAL HYPERTENSION: ICD-10-CM

## 2025-01-03 DIAGNOSIS — E66.01 MORBID OBESITY WITH BMI OF 40.0-44.9, ADULT: ICD-10-CM

## 2025-01-03 DIAGNOSIS — Z23 NEED FOR TDAP VACCINATION: ICD-10-CM

## 2025-01-03 DIAGNOSIS — Z23 NEED FOR INFLUENZA VACCINATION: ICD-10-CM

## 2025-01-03 DIAGNOSIS — E78.00 HYPERCHOLESTEROLEMIA: ICD-10-CM

## 2025-01-03 DIAGNOSIS — G47.33 OSA (OBSTRUCTIVE SLEEP APNEA): ICD-10-CM

## 2025-01-03 DIAGNOSIS — F41.8 SITUATIONAL ANXIETY: ICD-10-CM

## 2025-01-03 DIAGNOSIS — I10 ESSENTIAL HYPERTENSION: Primary | ICD-10-CM

## 2025-01-03 LAB
ALBUMIN SERPL-MCNC: 4.6 G/DL (ref 3.4–5)
ALBUMIN/GLOB SERPL: 1.8 {RATIO} (ref 1.1–2.2)
ALP SERPL-CCNC: 54 U/L (ref 40–129)
ALT SERPL-CCNC: 48 U/L (ref 10–40)
ANION GAP SERPL CALCULATED.3IONS-SCNC: 11 MMOL/L (ref 3–16)
AST SERPL-CCNC: 23 U/L (ref 15–37)
BASOPHILS # BLD: 0 K/UL (ref 0–0.2)
BASOPHILS NFR BLD: 0.5 %
BILIRUB SERPL-MCNC: 0.3 MG/DL (ref 0–1)
BUN SERPL-MCNC: 15 MG/DL (ref 7–20)
CALCIUM SERPL-MCNC: 9.1 MG/DL (ref 8.3–10.6)
CHLORIDE SERPL-SCNC: 102 MMOL/L (ref 99–110)
CHOLEST SERPL-MCNC: 182 MG/DL (ref 0–199)
CO2 SERPL-SCNC: 25 MMOL/L (ref 21–32)
CREAT SERPL-MCNC: 0.9 MG/DL (ref 0.9–1.3)
DEPRECATED RDW RBC AUTO: 13.2 % (ref 12.4–15.4)
EOSINOPHIL # BLD: 0.1 K/UL (ref 0–0.6)
EOSINOPHIL NFR BLD: 1.3 %
GFR SERPLBLD CREATININE-BSD FMLA CKD-EPI: >90 ML/MIN/{1.73_M2}
GLUCOSE SERPL-MCNC: 87 MG/DL (ref 70–99)
HCT VFR BLD AUTO: 45.4 % (ref 40.5–52.5)
HDLC SERPL-MCNC: 38 MG/DL (ref 40–60)
HGB BLD-MCNC: 15.3 G/DL (ref 13.5–17.5)
LDL CHOLESTEROL: 131 MG/DL
LYMPHOCYTES # BLD: 1.9 K/UL (ref 1–5.1)
LYMPHOCYTES NFR BLD: 34.8 %
MCH RBC QN AUTO: 30.4 PG (ref 26–34)
MCHC RBC AUTO-ENTMCNC: 33.8 G/DL (ref 31–36)
MCV RBC AUTO: 90.1 FL (ref 80–100)
MONOCYTES # BLD: 0.6 K/UL (ref 0–1.3)
MONOCYTES NFR BLD: 10.7 %
NEUTROPHILS # BLD: 2.8 K/UL (ref 1.7–7.7)
NEUTROPHILS NFR BLD: 52.7 %
PLATELET # BLD AUTO: 251 K/UL (ref 135–450)
PMV BLD AUTO: 8.1 FL (ref 5–10.5)
POTASSIUM SERPL-SCNC: 4.1 MMOL/L (ref 3.5–5.1)
PROT SERPL-MCNC: 7.2 G/DL (ref 6.4–8.2)
RBC # BLD AUTO: 5.04 M/UL (ref 4.2–5.9)
SODIUM SERPL-SCNC: 138 MMOL/L (ref 136–145)
TRIGL SERPL-MCNC: 65 MG/DL (ref 0–150)
TSH SERPL DL<=0.005 MIU/L-ACNC: 1.78 UIU/ML (ref 0.27–4.2)
VLDLC SERPL CALC-MCNC: 13 MG/DL
WBC # BLD AUTO: 5.4 K/UL (ref 4–11)

## 2025-01-03 RX ORDER — PHENTERMINE HYDROCHLORIDE 37.5 MG/1
37.5 TABLET ORAL
Qty: 30 TABLET | Refills: 0 | Status: SHIPPED | OUTPATIENT
Start: 2025-01-03 | End: 2025-02-02

## 2025-01-03 ASSESSMENT — PATIENT HEALTH QUESTIONNAIRE - PHQ9
SUM OF ALL RESPONSES TO PHQ QUESTIONS 1-9: 0
SUM OF ALL RESPONSES TO PHQ QUESTIONS 1-9: 0
2. FEELING DOWN, DEPRESSED OR HOPELESS: NOT AT ALL
1. LITTLE INTEREST OR PLEASURE IN DOING THINGS: NOT AT ALL
SUM OF ALL RESPONSES TO PHQ QUESTIONS 1-9: 0
SUM OF ALL RESPONSES TO PHQ QUESTIONS 1-9: 0
SUM OF ALL RESPONSES TO PHQ9 QUESTIONS 1 & 2: 0

## 2025-01-03 ASSESSMENT — ENCOUNTER SYMPTOMS
VOMITING: 0
ABDOMINAL PAIN: 0
CONSTIPATION: 0
BLOOD IN STOOL: 0
NAUSEA: 0
COLOR CHANGE: 0
SHORTNESS OF BREATH: 0
DIARRHEA: 0

## 2025-01-03 NOTE — PROGRESS NOTES
Low dose flu vaccine given in left deltoid and patient tolerated well.   Tdap given in right  deltoid and patient tolerated well.   
twice a day if needed       No current facility-administered medications for this visit.       ALLERGIES  No Known Allergies    PHYSICAL EXAM    /70 (Site: Right Upper Arm, Position: Sitting, Cuff Size: Large Adult)   Pulse 72   Resp 18   Ht 1.753 m (5' 9\")   Wt 128.3 kg (282 lb 12.8 oz)   SpO2 97%   BMI 41.76 kg/m²     Physical Exam  Vitals and nursing note reviewed.   Constitutional:       General: He is awake. He is not in acute distress.     Appearance: Normal appearance. He is well-developed and well-groomed. He is obese.   HENT:      Head: Normocephalic and atraumatic.   Eyes:      General: No scleral icterus.     Conjunctiva/sclera: Conjunctivae normal.   Neck:      Thyroid: No thyromegaly.      Vascular: No JVD.      Trachea: No tracheal deviation.   Cardiovascular:      Rate and Rhythm: Normal rate and regular rhythm.      Heart sounds: Normal heart sounds.   Pulmonary:      Effort: Pulmonary effort is normal. No respiratory distress.      Breath sounds: Normal breath sounds. No wheezing or rales.   Abdominal:      General: Bowel sounds are normal. There is no distension.      Tenderness: There is no guarding.   Skin:     General: Skin is warm and dry.      Nails: There is no clubbing.   Neurological:      Mental Status: He is alert and oriented to person, place, and time. Mental status is at baseline.      Comments: Nonfocal   Psychiatric:         Mood and Affect: Mood normal.         Behavior: Behavior normal. Behavior is cooperative.         Thought Content: Thought content normal.         Judgment: Judgment normal.        Diagnosis Orders   1. Essential hypertension  Comprehensive Metabolic Panel    CBC with Auto Differential      2. Situational anxiety        3. Morbid obesity with BMI of 40.0-44.9, adult  phentermine (ADIPEX-P) 37.5 MG tablet      4. SHARYN (obstructive sleep apnea)        5. Hypercholesterolemia  Lipid, Fasting    TSH reflex to FT4,FT3      6. Need for influenza vaccination

## 2025-02-14 ENCOUNTER — OFFICE VISIT (OUTPATIENT)
Dept: FAMILY MEDICINE CLINIC | Age: 40
End: 2025-02-14
Payer: COMMERCIAL

## 2025-02-14 VITALS
BODY MASS INDEX: 42.32 KG/M2 | OXYGEN SATURATION: 96 % | HEART RATE: 65 BPM | RESPIRATION RATE: 16 BRPM | HEIGHT: 69 IN | SYSTOLIC BLOOD PRESSURE: 128 MMHG | DIASTOLIC BLOOD PRESSURE: 78 MMHG | WEIGHT: 285.7 LBS

## 2025-02-14 DIAGNOSIS — E66.01 MORBID OBESITY WITH BMI OF 40.0-44.9, ADULT: ICD-10-CM

## 2025-02-14 PROCEDURE — 3078F DIAST BP <80 MM HG: CPT | Performed by: STUDENT IN AN ORGANIZED HEALTH CARE EDUCATION/TRAINING PROGRAM

## 2025-02-14 PROCEDURE — 3074F SYST BP LT 130 MM HG: CPT | Performed by: STUDENT IN AN ORGANIZED HEALTH CARE EDUCATION/TRAINING PROGRAM

## 2025-02-14 PROCEDURE — 99213 OFFICE O/P EST LOW 20 MIN: CPT | Performed by: STUDENT IN AN ORGANIZED HEALTH CARE EDUCATION/TRAINING PROGRAM

## 2025-02-14 RX ORDER — PHENTERMINE HYDROCHLORIDE 37.5 MG/1
37.5 TABLET ORAL
Qty: 30 TABLET | Refills: 0 | Status: SHIPPED | OUTPATIENT
Start: 2025-02-14 | End: 2025-03-16

## 2025-02-14 RX ORDER — CICLOPIROX 1 G/100ML
SHAMPOO TOPICAL
COMMUNITY
Start: 2025-02-03

## 2025-02-14 SDOH — ECONOMIC STABILITY: FOOD INSECURITY: WITHIN THE PAST 12 MONTHS, THE FOOD YOU BOUGHT JUST DIDN'T LAST AND YOU DIDN'T HAVE MONEY TO GET MORE.: NEVER TRUE

## 2025-02-14 SDOH — ECONOMIC STABILITY: FOOD INSECURITY: WITHIN THE PAST 12 MONTHS, YOU WORRIED THAT YOUR FOOD WOULD RUN OUT BEFORE YOU GOT MONEY TO BUY MORE.: NEVER TRUE

## 2025-02-14 NOTE — PROGRESS NOTES
2/20/2025    Crow Castellano    Chief Complaint   Patient presents with    1 Month Follow-Up     1 month. Addipex. Doing well. Has been off of it for a week. Couldn't get in to see Paola.     Blood Pressure Check     Blood pressure check.        HPI  Crow is a 39 y.o. male with a PMHx as listed below who presents today for follow up  weight management. BP excellent, weight has hit plateau he will try to focus on diet/lifestyle    History of Present Illness        1. Morbid obesity with BMI of 40.0-44.9, adult             REVIEW OF SYMPTOMS    Review of Systems   Constitutional:  Negative for chills and fatigue.   HENT:  Negative for congestion and sore throat.    Respiratory:  Negative for shortness of breath and wheezing.    Cardiovascular:  Negative for chest pain and palpitations.   Gastrointestinal:  Negative for abdominal pain and nausea.   Genitourinary:  Negative for frequency and urgency.   Neurological:  Negative for light-headedness.       PAST MEDICAL HISTORY  Past Medical History:   Diagnosis Date    ADHD (attention deficit hyperactivity disorder) Not sure of the date    Took Medicine when i was in grade school. Not longer do anything for it.    Anxiety 2015    Chronic infection of right knee (HCC) 10/14/2021    Depression 2015    Hypertension     Infection of right knee (HCC) 10/12/2021    MSSA (methicillin susceptible Staphylococcus aureus) infection 07/28/2021    Sleep apnea        FAMILY HISTORY  Family History   Problem Relation Age of Onset    Other Mother         Hodgekin's    Cancer Father         Testicular       SOCIAL HISTORY  Social History     Socioeconomic History    Marital status:    Tobacco Use    Smoking status: Never    Smokeless tobacco: Former     Types: Chew     Quit date: 2/4/2017   Vaping Use    Vaping status: Never Used   Substance and Sexual Activity    Alcohol use: Not Currently     Comment: Occasionally    Drug use: Never    Sexual activity: Yes     Partners: Female

## 2025-02-20 ASSESSMENT — ENCOUNTER SYMPTOMS
NAUSEA: 0
SHORTNESS OF BREATH: 0
SORE THROAT: 0
WHEEZING: 0
ABDOMINAL PAIN: 0

## 2025-06-13 DIAGNOSIS — I10 ESSENTIAL HYPERTENSION: ICD-10-CM

## 2025-06-13 RX ORDER — LISINOPRIL AND HYDROCHLOROTHIAZIDE 10; 12.5 MG/1; MG/1
1 TABLET ORAL DAILY
Qty: 90 TABLET | Refills: 1 | Status: SHIPPED | OUTPATIENT
Start: 2025-06-13

## 2025-07-24 DIAGNOSIS — F41.8 SITUATIONAL ANXIETY: ICD-10-CM

## (undated) DEVICE — TOWEL,OR,DSP,ST,BLUE,STD,6/PK,12PK/CS: Brand: MEDLINE

## (undated) DEVICE — TUBING, SUCTION, 9/32" X 10', STRAIGHT: Brand: MEDLINE

## (undated) DEVICE — SPONGE LAP W18XL18IN WHT COT 4 PLY FLD STRUNG RADPQ DISP ST

## (undated) DEVICE — GLOVE SURG SZ 8 CRM LTX FREE POLYISOPRENE POLYMER BEAD ANTI

## (undated) DEVICE — INTENDED FOR TISSUE SEPARATION, AND OTHER PROCEDURES THAT REQUIRE A SHARP SURGICAL BLADE TO PUNCTURE OR CUT.: Brand: BARD-PARKER ® STAINLESS STEEL BLADES

## (undated) DEVICE — 3M™ STERI-DRAPE™ U-DRAPE 1015: Brand: STERI-DRAPE™

## (undated) DEVICE — GLOVE ORANGE PI 7 1/2   MSG9075

## (undated) DEVICE — DRAPE,U/ SHT,SPLIT,PLAS,STERIL: Brand: MEDLINE

## (undated) DEVICE — TRAY PREP DRY W/ PREM GLV 2 APPL 6 SPNG 2 UNDPD 1 OVERWRAP

## (undated) DEVICE — SUTURE N ABSRB MONOFILAMENT 2-0 38 IN 39 MM BLU FORC FIBER 3910900022

## (undated) DEVICE — BANDAGE COMPR 5 YDX6 IN DBL ACE HONEYCOMB

## (undated) DEVICE — PADDING CAST W6INXL4YD COT LO LINTING WYTEX

## (undated) DEVICE — FAN SPRAY KIT: Brand: PULSAVAC®

## (undated) DEVICE — CONVERTORS STOCKINETTE: Brand: CONVERTORS

## (undated) DEVICE — SUTURE VCRL SZ 2-0 L18IN ABSRB UD CT-1 L36MM 1/2 CIR J839D

## (undated) DEVICE — PENCIL ES CRD L10FT HND SWCHING ROCK SWCH W/ EDGE COAT BLDE

## (undated) DEVICE — MARKER SURG SKIN UTIL REGULAR/FINE 2 TIP W/ RUL AND 9 LBL

## (undated) DEVICE — PAD,ABDOMINAL,5"X9",ST,LF,25/BX: Brand: MEDLINE INDUSTRIES, INC.

## (undated) DEVICE — YANKAUER,FLEXIBLE HANDLE,REGLR CAPACITY: Brand: MEDLINE INDUSTRIES, INC.

## (undated) DEVICE — STRIP WND PK W0.25INXL5YD IODO GZ TIGHTLY WVN CURAD

## (undated) DEVICE — DRAPE,EXTREMITY,89X128,STERILE: Brand: MEDLINE

## (undated) DEVICE — ZIMMER® STERILE DISPOSABLE TOURNIQUET CUFF WITH PLC, DUAL PORT, SINGLE BLADDER, 30 IN. (76 CM)

## (undated) DEVICE — BANDAGE COMPR W6INXL5YD SELF ADH COHESIVE CO FLX

## (undated) DEVICE — SPONGE GZ W4XL8IN COT WVN 12 PLY

## (undated) DEVICE — BANDAGE COMPR W6INXL5YD WHT BGE POLY COT M E WRP WV HK AND

## (undated) DEVICE — DRAPE SHEET ULTRAGARD: Brand: MEDLINE

## (undated) DEVICE — CATH URETH 24FR DOVER RED LTX

## (undated) DEVICE — ZIMMER® STERILE DISPOSABLE TOURNIQUET CUFF WITH PLC, DUAL PORT, SINGLE BLADDER, 34 IN. (86 CM)

## (undated) DEVICE — 4-PORT MANIFOLD: Brand: NEPTUNE 2

## (undated) DEVICE — SOLUTION IV 1000ML 0.9% SOD CHL FOR IRRIG PLAS CONT

## (undated) DEVICE — SUTURE VCRL SZ 1 L27IN ABSRB UD L36MM CT-1 1/2 CIR JJ40G

## (undated) DEVICE — ELECTRODE ES AD CRDLSS PT RET REM POLYHESIVE

## (undated) DEVICE — GLOVE SURG SZ 75 CRM LTX FREE POLYISOPRENE POLYMER BEAD ANTI

## (undated) DEVICE — DRESSING PETRO W3XL3IN OIL EMUL N ADH GZ KNIT IMPREG CELOS

## (undated) DEVICE — Z DISCONTINUED PER MEDLINE USE 2741942 DRESSING AQUACEL 6 IN ALG W9XL15CM SIL CVR WTRPRF VIR BACT BARR ANTIMIC

## (undated) DEVICE — SURGICAL PROCEDURE PACK TOT KNEE LF

## (undated) DEVICE — SYRINGE IRRIG 60ML SFT PLIABLE BLB EZ TO GRP 1 HND USE W/

## (undated) DEVICE — GLOVE ORANGE PI 7   MSG9070

## (undated) DEVICE — COUNTER NDL 30 COUNT FOAM STRP SGL MAG

## (undated) DEVICE — GLOVE SURG SZ 65 CRM LTX FREE POLYISOPRENE POLYMER BEAD ANTI

## (undated) DEVICE — DECANTER BAG 9": Brand: MEDLINE INDUSTRIES, INC.

## (undated) DEVICE — SOLUTION PREP POVIDONE IOD FOR SKIN MUCOUS MEM PRIOR TO

## (undated) DEVICE — DUAL SPIKE Y-CONNECTOR SET, PACKAGED: Brand: PULSAVAC®

## (undated) DEVICE — NDLCTR: FOAM STRIP/MAG 15CT 168/CS: Brand: MEDICAL ACTION INDUSTRIES

## (undated) DEVICE — GLOVE SURG SZ 8 L12IN THK75MIL DK GRN LTX FREE

## (undated) DEVICE — SINGLE PORT MANIFOLD: Brand: NEPTUNE 2

## (undated) DEVICE — HEWSON SUTURE RETRIEVER: Brand: HEWSON SUTURE RETRIEVER

## (undated) DEVICE — BLADE CLIPPER GEN PURP NS

## (undated) DEVICE — GOWN,ECLIPSE,POLYRNF,BRTHSLV,XL,30/CS: Brand: MEDLINE

## (undated) DEVICE — STERILE NEOPRENE POWDER-FREE SURGICAL GLOVES WITH NITRILE COATING: Brand: PROTEXIS

## (undated) DEVICE — GLOVE SURG SZ 7 CRM LTX FREE POLYISOPRENE POLYMER BEAD ANTI